# Patient Record
Sex: MALE | Race: WHITE | NOT HISPANIC OR LATINO | Employment: FULL TIME | URBAN - METROPOLITAN AREA
[De-identification: names, ages, dates, MRNs, and addresses within clinical notes are randomized per-mention and may not be internally consistent; named-entity substitution may affect disease eponyms.]

---

## 2017-09-01 ENCOUNTER — OFFICE VISIT (OUTPATIENT)
Dept: PEDIATRICS | Facility: CLINIC | Age: 32
End: 2017-09-01
Payer: COMMERCIAL

## 2017-09-01 VITALS
WEIGHT: 225.4 LBS | OXYGEN SATURATION: 95 % | HEIGHT: 73 IN | TEMPERATURE: 97.9 F | HEART RATE: 64 BPM | BODY MASS INDEX: 29.87 KG/M2 | DIASTOLIC BLOOD PRESSURE: 65 MMHG | SYSTOLIC BLOOD PRESSURE: 102 MMHG

## 2017-09-01 DIAGNOSIS — Z13.6 CARDIOVASCULAR SCREENING; LDL GOAL LESS THAN 160: ICD-10-CM

## 2017-09-01 DIAGNOSIS — L73.9 FOLLICULITIS: ICD-10-CM

## 2017-09-01 DIAGNOSIS — Z00.00 ENCOUNTER FOR ROUTINE ADULT HEALTH EXAMINATION WITHOUT ABNORMAL FINDINGS: Primary | ICD-10-CM

## 2017-09-01 DIAGNOSIS — Z12.5 SCREENING FOR PROSTATE CANCER: ICD-10-CM

## 2017-09-01 DIAGNOSIS — K62.5 RECTAL BLEEDING: ICD-10-CM

## 2017-09-01 DIAGNOSIS — Z80.42 FAMILY HISTORY OF MALIGNANT NEOPLASM OF PROSTATE: ICD-10-CM

## 2017-09-01 DIAGNOSIS — Z13.1 SCREENING FOR DIABETES MELLITUS: ICD-10-CM

## 2017-09-01 LAB
ANION GAP SERPL CALCULATED.3IONS-SCNC: 7 MMOL/L (ref 3–14)
BUN SERPL-MCNC: 14 MG/DL (ref 7–30)
CALCIUM SERPL-MCNC: 9 MG/DL (ref 8.5–10.1)
CHLORIDE SERPL-SCNC: 107 MMOL/L (ref 94–109)
CHOLEST SERPL-MCNC: 209 MG/DL
CO2 SERPL-SCNC: 26 MMOL/L (ref 20–32)
CREAT SERPL-MCNC: 0.91 MG/DL (ref 0.66–1.25)
ERYTHROCYTE [DISTWIDTH] IN BLOOD BY AUTOMATED COUNT: 13.6 % (ref 10–15)
GFR SERPL CREATININE-BSD FRML MDRD: >90 ML/MIN/1.7M2
GLUCOSE SERPL-MCNC: 88 MG/DL (ref 70–99)
HCT VFR BLD AUTO: 46.9 % (ref 40–53)
HDLC SERPL-MCNC: 68 MG/DL
HGB BLD-MCNC: 16.1 G/DL (ref 13.3–17.7)
LDLC SERPL CALC-MCNC: 116 MG/DL
MCH RBC QN AUTO: 28.9 PG (ref 26.5–33)
MCHC RBC AUTO-ENTMCNC: 34.3 G/DL (ref 31.5–36.5)
MCV RBC AUTO: 84 FL (ref 78–100)
NONHDLC SERPL-MCNC: 141 MG/DL
PLATELET # BLD AUTO: 220 10E9/L (ref 150–450)
POTASSIUM SERPL-SCNC: 4.1 MMOL/L (ref 3.4–5.3)
PSA SERPL-ACNC: 0.73 UG/L (ref 0–4)
RBC # BLD AUTO: 5.57 10E12/L (ref 4.4–5.9)
SODIUM SERPL-SCNC: 140 MMOL/L (ref 133–144)
TRIGL SERPL-MCNC: 123 MG/DL
WBC # BLD AUTO: 6 10E9/L (ref 4–11)

## 2017-09-01 PROCEDURE — 99395 PREV VISIT EST AGE 18-39: CPT | Performed by: NURSE PRACTITIONER

## 2017-09-01 PROCEDURE — 80048 BASIC METABOLIC PNL TOTAL CA: CPT | Performed by: NURSE PRACTITIONER

## 2017-09-01 PROCEDURE — G0103 PSA SCREENING: HCPCS | Performed by: NURSE PRACTITIONER

## 2017-09-01 PROCEDURE — 80061 LIPID PANEL: CPT | Performed by: NURSE PRACTITIONER

## 2017-09-01 PROCEDURE — 85027 COMPLETE CBC AUTOMATED: CPT | Performed by: NURSE PRACTITIONER

## 2017-09-01 PROCEDURE — 36415 COLL VENOUS BLD VENIPUNCTURE: CPT | Performed by: NURSE PRACTITIONER

## 2017-09-01 RX ORDER — DOXYCYCLINE HYCLATE 100 MG
100 TABLET ORAL 2 TIMES DAILY
Qty: 14 TABLET | Refills: 0 | Status: SHIPPED | OUTPATIENT
Start: 2017-09-01 | End: 2017-09-14

## 2017-09-01 NOTE — LETTER
September 4, 2017      Loco Landeros                                                                [address]  [place] [address]          Dear Loco,    The results of your recent   -Kidney function is normal (Cr, GFR), Sodium is normal, Potassium is normal, Calcium is normal, Glucose is normal (diabetes screening test).   -LDL(bad) cholesterol level is slightly  elevated,  A diet high in fat and simple carbohydrates, genetics and being overweight can contribute to this. ADVISE: Exercise, a low fat, low carbohydrate diet and weight control are helpful to improve this.  Rechecking your fasting cholesterol panel in 12 months is recommended (Lipid w/ LDL reflex, DX:hyperlipidemia)  -PSA (prostate specific antigen) test is normal.  -Normal red blood cell (hgb) levels, normal white blood cell count and normal platelet levels.    Results for orders placed or performed in visit on 09/01/17   Lipid panel reflex to direct LDL   Result Value Ref Range    Cholesterol 209 (H) <200 mg/dL    Triglycerides 123 <150 mg/dL    HDL Cholesterol 68 >39 mg/dL    LDL Cholesterol Calculated 116 (H) <100 mg/dL    Non HDL Cholesterol 141 (H) <130 mg/dL   Basic metabolic panel   Result Value Ref Range    Sodium 140 133 - 144 mmol/L    Potassium 4.1 3.4 - 5.3 mmol/L    Chloride 107 94 - 109 mmol/L    Carbon Dioxide 26 20 - 32 mmol/L    Anion Gap 7 3 - 14 mmol/L    Glucose 88 70 - 99 mg/dL    Urea Nitrogen 14 7 - 30 mg/dL    Creatinine 0.91 0.66 - 1.25 mg/dL    GFR Estimate >90 >60 mL/min/1.7m2    GFR Estimate If Black >90 >60 mL/min/1.7m2    Calcium 9.0 8.5 - 10.1 mg/dL   Prostate spec antigen screen   Result Value Ref Range    PSA 0.73 0 - 4 ug/L   CBC with platelets   Result Value Ref Range    WBC 6.0 4.0 - 11.0 10e9/L    RBC Count 5.57 4.4 - 5.9 10e12/L    Hemoglobin 16.1 13.3 - 17.7 g/dL    Hematocrit 46.9 40.0 - 53.0 %    MCV 84 78 - 100 fl    MCH 28.9 26.5 - 33.0 pg    MCHC 34.3 31.5 - 36.5 g/dL    RDW 13.6 10.0 -  15.0 %    Platelet Count 220 150 - 450 10e9/L         Please note that test explanations are brief and do not reflect all diagnostic uses.    Please make a follow-up appointment if you have additional questions.    Sincerely,       Vy Argueta RN, CNP

## 2017-09-01 NOTE — NURSING NOTE
"Chief Complaint   Patient presents with     Physical     ABDON-fasting today       Initial /65 (BP Location: Right arm, Patient Position: Sitting, Cuff Size: Adult Large)  Pulse 64  Temp 97.9  F (36.6  C) (Temporal)  Ht 6' 1\" (1.854 m)  Wt 225 lb 6.4 oz (102.2 kg)  SpO2 95%  BMI 29.74 kg/m2 Estimated body mass index is 29.74 kg/(m^2) as calculated from the following:    Height as of this encounter: 6' 1\" (1.854 m).    Weight as of this encounter: 225 lb 6.4 oz (102.2 kg).  Medication Reconciliation: complete      MOR Mo      "

## 2017-09-01 NOTE — MR AVS SNAPSHOT
After Visit Summary   9/1/2017    Loco Landeros    MRN: [id number]           Patient Information     Date Of Birth          1985        Visit Information        Provider Department      9/1/2017 8:00 AM Vy Argueta APRN CNP [facility]        Today's Diagnoses     Encounter for routine adult health examination without abnormal findings    -  1    CARDIOVASCULAR SCREENING; LDL GOAL LESS THAN 160        Screening for diabetes mellitus        Screening for prostate cancer        Family history of malignant neoplasm of prostate        Rectal bleeding        Folliculitis          Care Instructions    PLAN:   1.  Orders Placed This Encounter   Medications     doxycycline (VIBRA-TABS) 100 MG tablet     Sig: Take 1 tablet (100 mg) by mouth 2 times daily     Dispense:  14 tablet     Refill:  0     Orders Placed This Encounter   Procedures     Lipid panel reflex to direct LDL     Basic metabolic panel     JUST IN CASE     Prostate spec antigen screen     CBC with platelets     DERMATOLOGY REFERRAL     GASTROENTEROLOGY ADULT REF PROCEDURE ONLY       2. Patient needs to follow up in if no improvement,or sooner if worsening of symptoms or other symptoms develop.  CONSULTATION/REFERRAL to Dermatology and colonoscopy   Will follow up and/or notify patient on results via phone or mail to determine further need for followup  Follow up office visit in one year for annual health maintenance exam, sooner PRN.    Preventive Health Recommendations  Male Ages 26 - 39    Yearly exam:             See your health care provider every year in order to  o   Review health changes.   o   Discuss preventive care.    o   Review your medicines if your doctor has prescribed any.    You should be tested each year for STDs (sexually transmitted diseases), if you re at risk.     After age 35, talk to your provider about cholesterol testing. If you are at risk for heart disease, have your cholesterol  tested at least every 5 years.     If you are at risk for diabetes, you should have a diabetes test (fasting glucose).  Shots: Get a flu shot each year. Get a tetanus shot every 10 years.     Nutrition:    Eat at least 5 servings of fruits and vegetables daily.     Eat whole-grain bread, whole-wheat pasta and brown rice instead of white grains and rice.     Talk to your provider about Calcium and Vitamin D.     Lifestyle    Exercise for at least 150 minutes a week (30 minutes a day, 5 days a week). This will help you control your weight and prevent disease.     Limit alcohol to one drink per day.     No smoking.     Wear sunscreen to prevent skin cancer.     See your dentist every six months for an exam and cleaning.   It was a pleasure seeing you today at the [facility]. Thank you for allowing us to care for you today. We truly hope we provided you with the excellent service you deserve. Please let us know if there is anything else we can do for you so we can be sure you are leaving completley satisfied with your care experience.       General information about your clinic   Clinic Hours Lab Hours (Appointments are required)   Mon-Thurs: 7:30 AM - 7 PM Mon-Thurs: 7:30 AM - 7 PM   Fri: 7:30 AM - 5 PM Fri: 7:30 AM - 5 PM        After Hours Nurse Advise & Appts:  [facility] Nurse Advisors: [phone]  [facility] On Call: to make appointments anytime: [phone] On Call Physician: call [phone] and answering service will page the on call physician.        For urgent appointments, please call [phone] and ask for the triage nurse or your care team clinic nurse.  How to contact my care team:  MyChart: [email or web address]   Phone: [phone]   Fax: [phone]       [facility]:   Phone: [phone]  Hours: 8:00 AM - 6:00 PM  Medication Refills:  Call your pharmacy and they will forward the refill to us. Please allow 3 business days for your refills to be completed.        Normal or non-critical lab and imaging results will be communicated to you by MyChart, letter or phone within 7 days.  If you do not hear from us within 10 days, please call the clinic. If you have a critical or abnormal lab result, we will notify you by phone as soon as possible.       We now have PWIC (Pediatric Walk in Care)  Monday-Friday from 7:30-4. Simply walk in and be seen for your urgent needs like cough, fever, rash, diarrhea or vomiting, pink eye, UTI. No appointments needed. Ask one of the team for more information      -Your Care Team:    Dr. Maynor Piña - Internal Medicine/Pediatrics   Dr. Rebekah Love - Family Medicine  Dr. Mckenzie Judd - Pediatrics  Dr. Debra Martinez - Pediatrics  Vy Argueta CNP - Family Practice Nurse Practitioner                         Follow-ups after your visit        Additional Services     DERMATOLOGY REFERRAL       Your provider has referred you to: [facility]: [facility] [phone]   [email or web address]    Please be aware that coverage of these services is subject to the terms and limitations of your health insurance plan.  Call member services at your health plan with any benefit or coverage questions.      Please bring the following with you to your appointment:    (1) Any X-Rays, CTs or MRIs which have been performed.  Contact the facility where they were done to arrange for  prior to your scheduled appointment.    (2) List of current medications  (3) This referral request   (4) Any documents/labs given to you for this referral            GASTROENTEROLOGY ADULT REF PROCEDURE ONLY                 Who to contact     If you have questions or need follow up information about today's clinic visit or your schedule please contact [facility] directly at [phone].  Normal or non-critical lab and imaging results will be communicated to you by MyChart, letter or  "phone within 4 business days after the clinic has received the results. If you do not hear from us within 7 days, please contact the clinic through [facility] or phone. If you have a critical or abnormal lab result, we will notify you by phone as soon as possible.  Submit refill requests through [facility] or call your pharmacy and they will forward the refill request to us. Please allow 3 business days for your refill to be completed.          Additional Information About Your Visit        [facility] Information     [facility] is an electronic gateway that provides easy, online access to your medical records. With [facility], you can request a clinic appointment, read your test results, renew a prescription or communicate with your care team.     To sign up for [facility] visit the website at [email or web address]   You will be asked to enter the access code listed below, as well as some personal information. Please follow the directions to create your username and password.     Your access code is: [email or web address]  Expires: 2017  8:33 AM     Your access code will  in 90 days. If you need help or a new code, please contact your [facility] or call [phone] for assistance.        Care EveryWhere ID     This is your Care EveryWhere ID. This could be used by other organizations to access your [place] medical records  [id number]        Your Vitals Were     Pulse Temperature Height Pulse Oximetry BMI (Body Mass Index)       64 97.9  F (36.6  C) (Temporal) 6' 1\" (1.854 m) 95% 29.74 kg/m2        Blood Pressure from Last 3 Encounters:   17 102/65   16 102/70   04/29/15 106/70    Weight from Last 3 Encounters:   17 225 lb 6.4 oz (102.2 kg)   16 233 lb 1.6 oz (105.7 kg)   04/29/15 231 lb 3.2 oz (104.9 kg)              We Performed the Following     Basic metabolic panel     CBC with platelets     DERMATOLOGY REFERRAL     GASTROENTEROLOGY ADULT REF PROCEDURE ONLY     " JUST IN CASE     Lipid panel reflex to direct LDL     Prostate spec antigen screen          Today's Medication Changes          These changes are accurate as of: 9/1/17  8:34 AM.  If you have any questions, ask your nurse or doctor.               Start taking these medicines.        Dose/Directions    doxycycline 100 MG tablet   Commonly known as:  VIBRA-TABS   Used for:  Folliculitis   Started by:  Vy Argueta APRN CNP        Dose:  100 mg   Take 1 tablet (100 mg) by mouth 2 times daily   Quantity:  14 tablet   Refills:  0            Where to get your medicines      These medications were sent to [facility] [address] [facility] - [place], [place] - [address]  [address], [facility] [address]     Phone:  [phone]     doxycycline 100 MG tablet                Primary Care Provider    [facility]       No address on file        Equal Access to Services     STEFANIA TAYLOR : Wanda Lugo, waarnaldo luqadaha, qaybta kaalmagee prado, laurence tyson . [facility] [phone].    ATENCIÓN: Si habla español, tiene a torrez disposición servicios gratuitos de asistencia lingüística. Llame al [phone].    We comply with applicable federal civil rights laws and [place] laws. We do not discriminate on the basis of race, color, national origin, age, disability sex, sexual orientation or gender identity.            Thank you!     Thank you for choosing [facility]  for your care. Our goal is always to provide you with excellent care. Hearing back from our patients is one way we can continue to improve our services. Please take a few minutes to complete the written survey that you may receive in the mail after your visit with us. Thank you!             Your Updated Medication List - Protect others around you: Learn how to safely use, store and throw away your medicines at www.disposemymeds.org.          This list is accurate as of:  9/1/17  8:34 AM.  Always use your most recent med list.                   Brand Name Dispense Instructions for use Diagnosis    doxycycline 100 MG tablet    VIBRA-TABS    14 tablet    Take 1 tablet (100 mg) by mouth 2 times daily    Folliculitis

## 2017-09-01 NOTE — PROGRESS NOTES
SUBJECTIVE:   CC: Loco Landeros is an 32 year old male who presents for preventative health visit.     Healthy Habits:    Do you get at least three servings of calcium containing foods daily (dairy, green leafy vegetables, etc.)? yes    Amount of exercise or daily activities, outside of work: 3-4 day(s) per week    Problems taking medications regularly not applicable    Medication side effects: No    Have you had an eye exam in the past two years? no    Do you see a dentist twice per year? yes  Do you have sleep apnea, excessive snoring or daytime drowsiness?no      Today's PHQ-2 Score:   PHQ-2 ( 1999 [facility]) 9/1/2017 2/8/2016   Q1: Little interest or pleasure in doing things 0 0   Q2: Feeling down, depressed or hopeless 0 0   PHQ-2 Score 0 0         Abuse: Current or Past(Physical, Sexual or Emotional)- No  Do you feel safe in your environment - Yes  Social History   Substance Use Topics     Smoking status: Former Smoker     Packs/day: 0.50     Years: 5.00     Types: Cigarettes     Smokeless tobacco: Former User     Types: Chew     Quit date: 1/1/2002      Comment: socially smoker     Alcohol use Yes      Comment: once a week      The patient does not drink >3 drinks per day nor >7 drinks per week.    Last PSA:   PSA   Date Value Ref Range Status   04/29/2015 0.72 0 - 4 ug/L Final       Reviewed orders with patient. Reviewed health maintenance and updated orders accordingly - Yes  Labs reviewed in EPIC  Patient Active Problem List   Diagnosis     Degeneration of lumbar or lumbosacral intervertebral disc     Family history of malignant neoplasm of prostate     Low back pain     Nicotine use disorder     Past Surgical History:   Procedure Laterality Date     NO HISTORY OF SURGERY         Social History   Substance Use Topics     Smoking status: Former Smoker     Packs/day: 0.50     Years: 5.00     Types: Cigarettes     Smokeless tobacco: Former User     Types: Chew     Quit date: 1/1/2002      Comment: socially  smoker     Alcohol use Yes      Comment: once a week      Family History   Problem Relation Age of Onset     Prostate Cancer Father      Hypertension Brother      Coronary Artery Disease Maternal Grandfather      DIABETES No family hx of      Hyperlipidemia No family hx of      CEREBROVASCULAR DISEASE No family hx of      Breast Cancer No family hx of      Colon Cancer No family hx of      Depression No family hx of      Anxiety Disorder No family hx of      Thyroid Disease No family hx of      Genetic Disorder No family hx of      Asthma No family hx of          No current outpatient prescriptions on file.     No Known Allergies      Reviewed and updated as needed this visit by clinical staffTobacco  Allergies  Meds  Med Hx  Surg Hx  Fam Hx  Soc Hx        Reviewed and updated as needed this visit by Provider        Past Medical History:   Diagnosis Date     NO ACTIVE PROBLEMS       Past Surgical History:   Procedure Laterality Date     NO HISTORY OF SURGERY       ROS:  CONSTITUTIONAL:NEGATIVE for fever, chills, change in weight  INTEGUMENTARY/SKIN: NEGATIVE for changing lesion  and POSITIVE for acne on his back which has been going for about 10 years   EYES: NEGATIVE for vision changes or irritation  ENT: POSITIVE for ears are popping  and NEGATIVE for nasal congestion and sinus pressure  RESP:NEGATIVE for significant cough or SOB  CV: NEGATIVE for chest pain, palpitations or peripheral edema  GI: POSITIVE for constipation and noticed blood in stool periodically with wiping.  and NEGATIVE for nausea, poor appetite, vomiting and weight loss   male: negative for dysuria, hematuria, decreased urinary stream, erectile dysfunction, urethral discharge  MUSCULOSKELETAL:NEGATIVE for significant arthralgias or myalgia  NEURO: POSITIVE for few days a couple weeks ago was dizzy  and NEGATIVE for HX CVA, HX TIA, HX seizure D/O, involuntary movements, gait disturbance and syncope  ENDOCRINE: NEGATIVE for temperature  "intolerance, skin/hair changes  HEME/ALLERGY/IMMUNE: POSITIVE  for allergies and NEGATIVE for anemia and bleeding disorder  PSYCHIATRIC: NEGATIVE for changes in mood or affect    OBJECTIVE:   /65 (BP Location: Right arm, Patient Position: Sitting, Cuff Size: Adult Large)  Pulse 64  Temp 97.9  F (36.6  C) (Temporal)  Ht 6' 1\" (1.854 m)  Wt 225 lb 6.4 oz (102.2 kg)  SpO2 95%  BMI 29.74 kg/m2   Wt Readings from Last 4 Encounters:   09/01/17 225 lb 6.4 oz (102.2 kg)   02/08/16 233 lb 1.6 oz (105.7 kg)   04/29/15 231 lb 3.2 oz (104.9 kg)       EXAM:  GENERAL: alert, no distress and obese  EYES: Eyes grossly normal to inspection, PERRL and conjunctivae and sclerae normal  HENT: ear canals and TM's normal, nose and mouth without ulcers or lesions  NECK: no adenopathy, no asymmetry, masses, or scars and thyroid normal to palpation  RESP: lungs clear to auscultation - no rales, rhonchi or wheezes  CV: regular rate and rhythm, normal S1 S2, no S3 or S4, no murmur, click or rub, no peripheral edema and peripheral pulses strong  ABDOMEN: soft, nontender, no hepatosplenomegaly, no masses and bowel sounds normal   (male): normal male genitalia without lesions or urethral discharge, no hernia  RECTAL: normal appearing without obvious hemorrhoid   MS: no gross musculoskeletal defects noted, no edema  SKIN: no suspicious lesions or rashes. Isolated  small red papules and pustules originating in the hair follicles scattered over the back   NEURO: Normal strength and tone, mentation intact and speech normal  PSYCH: mentation appears normal, affect normal/bright  LYMPH: no cervical, supraclavicular, axillary, or inguinal adenopathy    ASSESSMENT/PLAN:   Loco was seen today for physical.    Diagnoses and all orders for this visit:    Encounter for routine adult health examination without abnormal findings  -     Lipid panel reflex to direct LDL  -     Basic metabolic panel  -     JUST IN CASE  -     Prostate spec " "antigen screen    CARDIOVASCULAR SCREENING; LDL GOAL LESS THAN 160  -     Lipid panel reflex to direct LDL    Screening for diabetes mellitus  -     Basic metabolic panel    Screening for prostate cancer  -     Prostate spec antigen screen    Family history of malignant neoplasm of prostate  -     Prostate spec antigen screen    Rectal bleeding  -     CBC with platelets  -     GASTROENTEROLOGY ADULT REF PROCEDURE ONLY    Folliculitis  -     DERMATOLOGY REFERRAL  -     doxycycline (VIBRA-TABS) 100 MG tablet; Take 1 tablet (100 mg) by mouth 2 times daily    PLAN:   Patient needs to follow up in if no improvement,or sooner if worsening of symptoms or other symptoms develop.  CONSULTATION/REFERRAL to Dermatology and colonoscopy   Will follow up and/or notify patient on results via phone or mail to determine further need for followup  Follow up office visit in one year for annual health maintenance exam, sooner PRN.    COUNSELING:  Reviewed preventive health counseling, as reflected in patient instructions  Special attention given to:        Regular exercise       Healthy diet/nutrition       Vision screening       reports that he has quit smoking. His smoking use included Cigarettes. He quit smokeless tobacco use about 15 years ago. His smokeless tobacco use included Chew.      Estimated body mass index is 30.75 kg/(m^2) as calculated from the following:    Height as of 2/8/16: 6' 1\" (1.854 m).    Weight as of 2/8/16: 233 lb 1.6 oz (105.7 kg).   Weight management plan: Discussed healthy diet and exercise guidelines and patient will follow up in 12 months in clinic to re-evaluate.    Counseling Resources:  ATP IV Guidelines  Pooled Cohorts Equation Calculator  FRAX Risk Assessment  ICSI Preventive Guidelines  Dietary Guidelines for Americans, 2010  USDA's MyPlate  ASA Prophylaxis  Lung CA Screening    Vy Argueta, ESSIE [facility]  "

## 2017-09-08 ENCOUNTER — PRE VISIT (OUTPATIENT)
Dept: DERMATOLOGY | Facility: CLINIC | Age: 32
End: 2017-09-08

## 2017-09-08 NOTE — TELEPHONE ENCOUNTER
Dermatology Pre-visit Call:    Reason for visit : skin check and folliculitis     Any personal history of skin cancers: No    Was the patient referred: Yes    If the patient was referred, are records obtained: No. (If no, then obtain records).    Has the patient seen a dermatologist in the past: ?. (If yes, obtain records)    Patient Reminders Given:  --Please, make sure you bring an updated list of your medications.   --Plan on being in our facility for approximately one hour, this includes the registration process, office visit, education and check-out process.   --We are located on the second floor of the building, check in desk 4.   --If you need to cancel or reschedule, call [phone].  --We look forward to seeing you in Dermatology Clinic.     Bessie Escobar LPN

## 2017-09-14 ENCOUNTER — OFFICE VISIT (OUTPATIENT)
Dept: DERMATOLOGY | Facility: CLINIC | Age: 32
End: 2017-09-14
Attending: NURSE PRACTITIONER
Payer: COMMERCIAL

## 2017-09-14 DIAGNOSIS — L70.0 ACNE VULGARIS: Primary | ICD-10-CM

## 2017-09-14 PROCEDURE — 99202 OFFICE O/P NEW SF 15 MIN: CPT | Performed by: DERMATOLOGY

## 2017-09-14 PROCEDURE — 87070 CULTURE OTHR SPECIMN AEROBIC: CPT | Performed by: DERMATOLOGY

## 2017-09-14 RX ORDER — TRETINOIN 0.5 MG/G
CREAM TOPICAL
Qty: 45 G | Refills: 11 | Status: SHIPPED | OUTPATIENT
Start: 2017-09-14 | End: 2018-07-05

## 2017-09-14 RX ORDER — DOXYCYCLINE 100 MG/1
100 TABLET ORAL 2 TIMES DAILY
Qty: 60 TABLET | Refills: 2 | Status: SHIPPED | OUTPATIENT
Start: 2017-09-14 | End: 2018-04-02

## 2017-09-14 ASSESSMENT — PAIN SCALES - GENERAL: PAINLEVEL: NO PAIN (0)

## 2017-09-14 NOTE — NURSING NOTE
Dermatology Rooming Note    Loco Landeros's goals for this visit include:   Chief Complaint   Patient presents with     Derm Problem     Folliculitis on back and face.  7 day course of doxycycline prescribed by PCP and patient noticed improvement.       Is a scribe okay for this visit:Not applicable    Are records needed for this visit(If yes, obtain release of information): No     Vitals: There were no vitals taken for this visit.    Referring Provider:  ESSIE Petersen CNP  [address]  [place] [address]    Sagrario Salas RN

## 2017-09-14 NOTE — LETTER
9/14/2017       RE: Loco Landeros  [address] [facility] [address]     Dear Colleague,    Thank you for referring your patient, Loco Landeros, to the [facility]. Please see a copy of my visit note below.    [facility] Dermatology Note      Dermatology Problem List:  1. New patient    CC:   Chief Complaint   Patient presents with     Derm Problem     Folliculitis on back and face.  7 day course of doxycycline prescribed by PCP and patient noticed improvement.       Encounter Date: Sep 14, 2017    History of Present Illness:  Mr. Loco Landeros is a 32 year old male who presents for evaluation of folliculitis and acne.     He has papules and pustules constantly on his back and face. It has been bad for about 10 years. At times there will be multiple pustules on top of one another, but this only occurs on his face. He is getting deeper painful pustules and cysts on his back. He has tried some OTC acne washes, but they have not been effective for the deeper or bigger pustules. There is scarring happening on his back and face.     He was prescribed a 7 day course of doxycyline which seemed to help, but the pustules have recurred since he discontinued.        Past Medical History:   Patient Active Problem List   Diagnosis     Degeneration of lumbar or lumbosacral intervertebral disc     Family history of malignant neoplasm of prostate     Low back pain     Nicotine use disorder     Past Medical History:   Diagnosis Date     NO ACTIVE PROBLEMS      Past Surgical History:   Procedure Laterality Date     NO HISTORY OF SURGERY         Social History:  The patient works as a  for [employer].   He does drink 3-6 alcoholic beverages a week. He does not smoke or chew.     Family History:  No family hx of skin cancer.     Medications:  Current Outpatient Prescriptions   Medication Sig Dispense Refill      doxycycline Monohydrate 100 MG TABS Take 100 mg by mouth 2 times daily 60 tablet 2     tretinoin (RETIN-A) 0.05 % cream Spread a pea size amount into affected area (face and back) topically at bedtime.  Use sunscreen SPF>20. 45 g 11     No Known Allergies      Review of Systems:  -Constitutional: The patient denies fatigue, fevers, chills, unintended weight loss, and night sweats.  -Skin: As above in HPI. No additional skin concerns.    Physical exam:  Vitals: There were no vitals taken for this visit.  GEN: This is a well developed, well-nourished male in no acute distress, in a pleasant mood.    SKIN: Waist-up skin, which includes the head/face, neck, both arms, chest, back, abdomen, digits and/or nails was examined.  - Multiple inflammatory papules and pustules scattered on his face and upper neck.   - His upper and mid back have multiple papules, pustules and cysts.   - Several resolving inflammatory papules on his back as well.     -No other lesions of concern on areas examined.     Impression/Plan:  1. Acne vulgaris    Discussed varying acne treatments. The multiple pustules can be indicative of MRSA, the papules on his jawline were cultured today.     We discussed topicals are unlikely to be helpful alone, but in combination with antibiotics may improve.     Also discussed Accutane briefly.     Patient ultimately chose to try PO antibiotics with topicals, but if not improving he is interested in Accutane.     Start Doxycycline 100mg PO BID. Discussed SE - nausea and photosensitivity.     Start tretinoin 0.05% cream to face and back. Instructions reviewed.     Recommended BP wash in the shower.     If culture confirms MRSA, will have patient decolonize with Mupirocin.     Follow-up in 3 months     Staff Involved:  Staff Only    Blade Wan DO    Department of Dermatology  [facility]: Phone: [phone],  Fax:[phone]  [facility]: Phone: [phone], Fax: [phone]

## 2017-09-14 NOTE — MR AVS SNAPSHOT
After Visit Summary   9/14/2017    Loco Landeros    MRN: [id number]           Patient Information     Date Of Birth          1985        Visit Information        Provider Department      9/14/2017 4:30 PM Blade Wan MD [facility]        Today's Diagnoses     Acne vulgaris    -  1      Care Instructions    Start over-the-counter benzoyl peroxide 10% wash on the face and back.  If 10% is too irritating you can use the 5%. (Clean&Clear makes this product. It is available here at the pharmacy or at target). This medication can bleach your towels and clothing.     It is found in a purple tube in the acne aisle.                 Doxycycline     1.Doxycycline treats and prevents infections and may be used to treat acne.   2.Do not use it if you had an allergic reaction to doxycycline or another tetracycline antibiotic, or if you are pregnant or breastfeeding.  3. If you miss a dose, take a dose as soon as you remember. If it is almost time for your next dose, wait until then and take a regular dose. Do not take extra medicine to make up for a missed dose.   4 . Some foods and medicines can affect the medication. Tell your doctor if you are using any of the following: bismuth subsalicylate, isotretinoin, acitretin, medications that contain aluminum, calcium, or iron (such an antacid or vitamin supplement)  5. This medicine may cause birth defects if being used during pregnancy.  Use two forms of birth control to keep from getting pregnant.   6. Tell your doctor if you had stomach surgery or if you have a history of yeast infections.   7. This medicine may cause the following problems (stop the medication if you experience these symptoms and call your doctor):  Permanent change in tooth color (in children younger than 8 years old)   Increased pressure inside the head   Yeast infection   Diarrhea    This medicine may make your skin more sun sensitive and increase sun burns. Wear  sunscreen and do not tan.     Allergic reaction with itching, hives, facial swelling, throat swelling, chest tightness, trouble breathing     Blistering, peeling, red skin rash     Burning, pain, or irritation in your upper stomach or throat     Joint pain, fever, rash, and unusual tiredness or weakness     Severe headache, dizziness, or vision changes  8. Call your doctor if your symptoms worsen or do not improve  Who should I call with questions?    [facility]: [phone]     [facility]: [phone]    For urgent needs outside of business hours call the [facility] at [phone] and ask for the dermatology resident on call              Topical Retinoids    What are topical retinoids?    These are medicines that are related to Vitamin A. They are used on the skin.    Retin-A , Renova , Differin , and Tazorac  are brand names.    Come in creams and clear gels    Used to treat skin conditions like pimples (acne), face wrinkling, or dark-colored sunspots    How do I use these medicines?    Wash face and let dry for 15 to 30 minutes.    Use a large pea-size amount of medicine to cover the whole face. Do not put on close to the eyes and lips. Rub in gently.     Start by using every other day. If you have no irritation after a few days, start to use it daily.     You might have too much irritation with daily use. Use it less often until the irritation goes away. Then try to increase slowly to daily use.     Irritation improves over time.    You may use moisturizer if your skin becomes dry. Look for  non-comedogenic  (non-pore plugging) and oil free products.     What are the side effects?    Dryness     Peeling and flaking     Irritation of the skin     Possible increased chance of sunburns. Protect your skin from sunlight. Wear a hat and use a sunscreen with SPF 30 or higher. Your sunscreen should have both UVA and UVB (broad-spectrum)  protection.    Who should I call with questions?    [facility]: [phone]     [facility]: [phone]    For urgent needs outside of business hours call the [facility] at [phone] and ask for the dermatology resident on call              Follow-ups after your visit        Your next 10 appointments already scheduled     Oct 02, 2017   Pro[place] with Justa Pickard [place]   [facility] [place]    [address] [facility] [address] [phone]            Jan 09, 2018  3:30 PM CST   Return Visit with Ricarda Barba MD   [facility]    [address]  [facility] [address] [phone]              Who to contact     If you have questions or need follow up information about today's clinic visit or your schedule please contact [facility] directly at [phone].  Normal or non-critical lab and imaging results will be communicated to you by MyChart, letter or phone within 4 business days after the clinic has received the results. If you do not hear from us within 7 days, please contact the clinic through MyChart or phone. If you have a critical or abnormal lab result, we will notify you by phone as soon as possible.  Submit refill requests through [facility] or call your pharmacy and they will forward the refill request to us. Please allow 3 business days for your refill to be completed.          Additional Information About Your Visit        [facility] Information     [facility] is an electronic gateway that provides easy, online access to your medical records. With [facility], you can request a clinic appointment, read your test results, renew a prescription or communicate with your care team.     To sign up for [facility] visit the website at [email or web address]   You will be asked to enter the access code listed below, as well as some personal  information. Please follow the directions to create your username and password.     Your access code is: [email or web address]  Expires: 2017  8:33 AM     Your access code will  in 90 days. If you need help or a new code, please contact your [facility] or call [phone] for assistance.        Care EveryWhere ID     This is your Care EveryWhere ID. This could be used by other organizations to access your [facility] medical records  [id number]         Blood Pressure from Last 3 Encounters:   17 102/65   16 102/70   04/29/15 106/70    Weight from Last 3 Encounters:   17 102.2 kg (225 lb 6.4 oz)   16 105.7 kg (233 lb 1.6 oz)   04/29/15 104.9 kg (231 lb 3.2 oz)              We Performed the Following     Skin Culture Aerobic Bacterial          Today's Medication Changes          These changes are accurate as of: 17  5:43 PM.  If you have any questions, ask your nurse or doctor.               Start taking these medicines.        Dose/Directions    doxycycline Monohydrate 100 MG Tabs   Used for:  Acne vulgaris   Started by:  Blade Wan MD        Dose:  100 mg   Take 100 mg by mouth 2 times daily   Quantity:  60 tablet   Refills:  2       tretinoin 0.05 % cream   Commonly known as:  RETIN-A   Used for:  Acne vulgaris   Started by:  Blade Wan MD        Spread a pea size amount into affected area (face and back) topically at bedtime.  Use sunscreen SPF>20.   Quantity:  45 g   Refills:  11            Where to get your medicines      These medications were sent to [place] [address] [place], [place] - [address]  [address], [facility] [address]     Phone:  [phone]     doxycycline Monohydrate 100 MG Tabs    tretinoin 0.05 % cream                Primary Care Provider    [facility]       No address on file        Equal Access to Services     STEFANIA TAYLOR AH: jaime Wells,  ross soraidagabriele rebecalaurence lindsey anatin hayaan adeeg kharash la'aan ah. So [facility] [phone].    ATENCIÓN: Si abdullahi calvert, tiene a torrez disposición servicios gratuitos de asistencia lingüística. Lindsey al [phone].    We comply with applicable federal civil rights laws and [place] laws. We do not discriminate on the basis of race, color, national origin, age, disability sex, sexual orientation or gender identity.            Thank you!     Thank you for choosing [facility]  for your care. Our goal is always to provide you with excellent care. Hearing back from our patients is one way we can continue to improve our services. Please take a few minutes to complete the written survey that you may receive in the mail after your visit with us. Thank you!             Your Updated Medication List - Protect others around you: Learn how to safely use, store and throw away your medicines at www.disposemymeds.org.          This list is accurate as of: 9/14/17  5:43 PM.  Always use your most recent med list.                   Brand Name Dispense Instructions for use Diagnosis    doxycycline Monohydrate 100 MG Tabs     60 tablet    Take 100 mg by mouth 2 times daily    Acne vulgaris       tretinoin 0.05 % cream    RETIN-A    45 g    Spread a pea size amount into affected area (face and back) topically at bedtime.  Use sunscreen SPF>20.    Acne vulgaris

## 2017-09-14 NOTE — PATIENT INSTRUCTIONS
Start over-the-counter benzoyl peroxide 10% wash on the face and back.  If 10% is too irritating you can use the 5%. (Clean&Clear makes this product. It is available here at the pharmacy or at target). This medication can bleach your towels and clothing.     It is found in a purple tube in the acne aisle.                 Doxycycline     1.Doxycycline treats and prevents infections and may be used to treat acne.   2.Do not use it if you had an allergic reaction to doxycycline or another tetracycline antibiotic, or if you are pregnant or breastfeeding.  3. If you miss a dose, take a dose as soon as you remember. If it is almost time for your next dose, wait until then and take a regular dose. Do not take extra medicine to make up for a missed dose.   4 . Some foods and medicines can affect the medication. Tell your doctor if you are using any of the following: bismuth subsalicylate, isotretinoin, acitretin, medications that contain aluminum, calcium, or iron (such an antacid or vitamin supplement)  5. This medicine may cause birth defects if being used during pregnancy.  Use two forms of birth control to keep from getting pregnant.   6. Tell your doctor if you had stomach surgery or if you have a history of yeast infections.   7. This medicine may cause the following problems (stop the medication if you experience these symptoms and call your doctor):  Permanent change in tooth color (in children younger than 8 years old)   Increased pressure inside the head   Yeast infection   Diarrhea    This medicine may make your skin more sun sensitive and increase sun burns. Wear sunscreen and do not tan.     Allergic reaction with itching, hives, facial swelling, throat swelling, chest tightness, trouble breathing     Blistering, peeling, red skin rash     Burning, pain, or irritation in your upper stomach or throat     Joint pain, fever, rash, and unusual tiredness or weakness     Severe headache, dizziness, or vision  changes  8. Call your doctor if your symptoms worsen or do not improve  Who should I call with questions?    [facility]: [phone]     [facility]: [phone]    For urgent needs outside of business hours call the [facility] at [phone] and ask for the dermatology resident on call              Topical Retinoids    What are topical retinoids?    These are medicines that are related to Vitamin A. They are used on the skin.    Retin-A , Renova , Differin , and Tazorac  are brand names.    Come in creams and clear gels    Used to treat skin conditions like pimples (acne), face wrinkling, or dark-colored sunspots    How do I use these medicines?    Wash face and let dry for 15 to 30 minutes.    Use a large pea-size amount of medicine to cover the whole face. Do not put on close to the eyes and lips. Rub in gently.     Start by using every other day. If you have no irritation after a few days, start to use it daily.     You might have too much irritation with daily use. Use it less often until the irritation goes away. Then try to increase slowly to daily use.     Irritation improves over time.    You may use moisturizer if your skin becomes dry. Look for  non-comedogenic  (non-pore plugging) and oil free products.     What are the side effects?    Dryness     Peeling and flaking     Irritation of the skin     Possible increased chance of sunburns. Protect your skin from sunlight. Wear a hat and use a sunscreen with SPF 30 or higher. Your sunscreen should have both UVA and UVB (broad-spectrum) protection.    Who should I call with questions?    [facility]: [phone]     [facility]: [phone]    For urgent needs outside of business hours call the [facility] at [phone] and ask for the dermatology resident on call

## 2017-09-15 NOTE — PROGRESS NOTES
[facility] Dermatology Note      Dermatology Problem List:  1. New patient    CC:   Chief Complaint   Patient presents with     Derm Problem     Folliculitis on back and face.  7 day course of doxycycline prescribed by PCP and patient noticed improvement.       Encounter Date: Sep 14, 2017    History of Present Illness:  Mr. Loco Landeros is a 32 year old male who presents for evaluation of folliculitis and acne.     He has papules and pustules constantly on his back and face. It has been bad for about 10 years. At times there will be multiple pustules on top of one another, but this only occurs on his face. He is getting deeper painful pustules and cysts on his back. He has tried some OTC acne washes, but they have not been effective for the deeper or bigger pustules. There is scarring happening on his back and face.     He was prescribed a 7 day course of doxycyline which seemed to help, but the pustules have recurred since he discontinued.        Past Medical History:   Patient Active Problem List   Diagnosis     Degeneration of lumbar or lumbosacral intervertebral disc     Family history of malignant neoplasm of prostate     Low back pain     Nicotine use disorder     Past Medical History:   Diagnosis Date     NO ACTIVE PROBLEMS      Past Surgical History:   Procedure Laterality Date     NO HISTORY OF SURGERY         Social History:  The patient works as a  for [employer].   He does drink 3-6 alcoholic beverages a week. He does not smoke or chew.     Family History:  No family hx of skin cancer.     Medications:  Current Outpatient Prescriptions   Medication Sig Dispense Refill     doxycycline Monohydrate 100 MG TABS Take 100 mg by mouth 2 times daily 60 tablet 2     tretinoin (RETIN-A) 0.05 % cream Spread a pea size amount into affected area (face and back) topically at bedtime.  Use sunscreen SPF>20. 45 g 11     No Known Allergies      Review of  Systems:  -Constitutional: The patient denies fatigue, fevers, chills, unintended weight loss, and night sweats.  -Skin: As above in HPI. No additional skin concerns.    Physical exam:  Vitals: There were no vitals taken for this visit.  GEN: This is a well developed, well-nourished male in no acute distress, in a pleasant mood.    SKIN: Waist-up skin, which includes the head/face, neck, both arms, chest, back, abdomen, digits and/or nails was examined.  - Multiple inflammatory papules and pustules scattered on his face and upper neck.   - His upper and mid back have multiple papules, pustules and cysts.   - Several resolving inflammatory papules on his back as well.     -No other lesions of concern on areas examined.     Impression/Plan:  1. Acne vulgaris    Discussed varying acne treatments. The multiple pustules can be indicative of MRSA, the papules on his jawline were cultured today.     We discussed topicals are unlikely to be helpful alone, but in combination with antibiotics may improve.     Also discussed Accutane briefly.     Patient ultimately chose to try PO antibiotics with topicals, but if not improving he is interested in Accutane.     Start Doxycycline 100mg PO BID. Discussed SE - nausea and photosensitivity.     Start tretinoin 0.05% cream to face and back. Instructions reviewed.     Recommended BP wash in the shower.     If culture confirms MRSA, will have patient decolonize with Mupirocin.     Follow-up in 3 months     Staff Involved:  Staff Only    Blade Wan DO    Department of Dermatology  [facility]: Phone: [phone], Fax:[phone]  [facility]: Phone: [phone], Fax: [phone]

## 2017-09-16 LAB
BACTERIA SPEC CULT: NORMAL
SPECIMEN SOURCE: NORMAL

## 2017-10-02 ENCOUNTER — HOSPITAL ENCOUNTER (OUTPATIENT)
Facility: AMBULATORY SURGERY CENTER | Age: 32
Discharge: HOME | End: 2017-10-02
Attending: FAMILY MEDICINE | Admitting: FAMILY MEDICINE
Payer: COMMERCIAL

## 2017-10-02 ENCOUNTER — SURGERY (OUTPATIENT)
Age: 32
End: 2017-10-02

## 2017-10-02 VITALS
SYSTOLIC BLOOD PRESSURE: 127 MMHG | OXYGEN SATURATION: 97 % | TEMPERATURE: 98.2 F | RESPIRATION RATE: 16 BRPM | DIASTOLIC BLOOD PRESSURE: 92 MMHG

## 2017-10-02 LAB — COLONOSCOPY: NORMAL

## 2017-10-02 PROCEDURE — 99152 MOD SED SAME PHYS/QHP 5/>YRS: CPT | Mod: 59 | Performed by: FAMILY MEDICINE

## 2017-10-02 PROCEDURE — G8907 PT DOC NO EVENTS ON DISCHARG: HCPCS

## 2017-10-02 PROCEDURE — 99153 MOD SED SAME PHYS/QHP EA: CPT | Performed by: FAMILY MEDICINE

## 2017-10-02 PROCEDURE — G8918 PT W/O PREOP ORDER IV AB PRO: HCPCS

## 2017-10-02 PROCEDURE — 45378 DIAGNOSTIC COLONOSCOPY: CPT

## 2017-10-02 PROCEDURE — 45378 DIAGNOSTIC COLONOSCOPY: CPT | Performed by: FAMILY MEDICINE

## 2017-10-02 RX ORDER — LIDOCAINE 40 MG/G
CREAM TOPICAL
Status: DISCONTINUED | OUTPATIENT
Start: 2017-10-02 | End: 2017-10-03 | Stop reason: HOSPADM

## 2017-10-02 RX ORDER — ONDANSETRON 2 MG/ML
4 INJECTION INTRAMUSCULAR; INTRAVENOUS
Status: DISCONTINUED | OUTPATIENT
Start: 2017-10-02 | End: 2017-10-03 | Stop reason: HOSPADM

## 2017-10-02 RX ORDER — FENTANYL CITRATE 50 UG/ML
INJECTION, SOLUTION INTRAMUSCULAR; INTRAVENOUS PRN
Status: DISCONTINUED | OUTPATIENT
Start: 2017-10-02 | End: 2017-10-02 | Stop reason: HOSPADM

## 2017-10-02 RX ADMIN — FENTANYL CITRATE 100 MCG: 50 INJECTION, SOLUTION INTRAMUSCULAR; INTRAVENOUS at 10:42

## 2017-10-02 RX ADMIN — FENTANYL CITRATE 50 MCG: 50 INJECTION, SOLUTION INTRAMUSCULAR; INTRAVENOUS at 10:52

## 2017-10-02 RX ADMIN — FENTANYL CITRATE 50 MCG: 50 INJECTION, SOLUTION INTRAMUSCULAR; INTRAVENOUS at 10:47

## 2017-12-29 ENCOUNTER — TELEPHONE (OUTPATIENT)
Dept: PEDIATRICS | Facility: CLINIC | Age: 32
End: 2017-12-29

## 2017-12-29 NOTE — TELEPHONE ENCOUNTER
Patient states that he was told he has hemorrhoids at his colonoscopy and everything looked pretty good, nothing to be concerned about.  He was under the impression he would have a follow up with a doctor to discuss the results and possible diet information.   He didn't and forgot about it because things had been fine.   Should he be following up with someone regarding this?    The night before christmas he ate velveeta cheese with rotel tomatoes.  Christmas day and the couple days following he was constipated and struggled to have BM.  He noticed blood in his stool like red pepper chunks and blood on the toilet paper.  He was really uncomfortable, then had diarrhea symptoms  He was laboring hard to have a BM.      Currently he has no more blood, has been getting better.  Still feels cramping all the way down to his feet.      Informed patient the velveeta cheese in its non melty state is exactly what happens inside your body the cheese kind of gels.  Most likely causing the constipation.  Also with his labored BM's causes pressure and the hemorrhoids can open up and bleed.  He states he didn't think of that.  He states maybe he was seeing the rotel tomatoes and not blood in his stool.    Routing to provider to advise further.    Luz Castillo RN,   . [facility]          Per 10/2/17 colonoscopy:  Procedure:                Colonoscopy   Indications:              Rectal bleeding   Impression:               - Non-thrombosed external hemorrhoids found on                             perianal exam.                             - The examination was otherwise normal on direct                             and retroflexion views.                             - No specimens collected.   Recommendation:           - Discharge patient to home.                             - Repeat colonoscopy at 48 years old for screening                             purposes.

## 2017-12-29 NOTE — TELEPHONE ENCOUNTER
Patient given message below.  Verbalized understanding.    Luz Castillo RN,   [facility]

## 2017-12-29 NOTE — TELEPHONE ENCOUNTER
Called patient, left message with phone number to call back.   Gayathri Sotelo RN, [facility]

## 2017-12-29 NOTE — TELEPHONE ENCOUNTER
[facility] Call Center    Phone Message    Name of Caller: Philippe    Phone Number: Cell number on file:    Telephone Information:   Mobile [phone]       Best time to return call: ANY    May a detailed message be left on voicemail: yes    Relation to patient: Self    Reason for Call: Other: Patient wants Christine to call to go over Colonoscopy results with him and discuss stomach issues he has been having     Action Taken: Message routed to:  Primary Care p [phone]

## 2018-02-12 ENCOUNTER — PRE VISIT (OUTPATIENT)
Dept: UROLOGY | Facility: CLINIC | Age: 33
End: 2018-02-12

## 2018-02-12 NOTE — TELEPHONE ENCOUNTER
PREVISIT INFORMATION                                                    Loco Landeros scheduled for future visit at [facility] specialty clinics.    Patient is scheduled to see Dr. Gutierres on 2/14/18  Reason for visit: Testicular mass  Referring provider azar  Has patient seen previous specialist? No  Medical Records:  Available in chart.  Patient was previously seen at a [place] or [facility] facility.     REVIEW                                                      New patient packet mailed to patient: Yes  Medication reconciliation complete: No      Current Outpatient Prescriptions   Medication Sig Dispense Refill     doxycycline Monohydrate 100 MG TABS Take 100 mg by mouth 2 times daily 60 tablet 2     tretinoin (RETIN-A) 0.05 % cream Spread a pea size amount into affected area (face and back) topically at bedtime.  Use sunscreen SPF>20. 45 g 11       Allergies: Review of patient's allergies indicates no known allergies.        PLAN/FOLLOW-UP NEEDED                                                      Previsit review complete.  Patient will see provider at future scheduled appointment.     Patient Reminders Given:  Please, make sure you bring an updated list of your medications.   If you are having a procedure, please, present 15 minutes early.  If you need to cancel or reschedule,please call [phone].    Zonia Fabian

## 2018-02-14 ENCOUNTER — OFFICE VISIT (OUTPATIENT)
Dept: UROLOGY | Facility: CLINIC | Age: 33
End: 2018-02-14
Payer: COMMERCIAL

## 2018-02-14 VITALS
DIASTOLIC BLOOD PRESSURE: 84 MMHG | HEART RATE: 56 BPM | SYSTOLIC BLOOD PRESSURE: 124 MMHG | WEIGHT: 230.8 LBS | OXYGEN SATURATION: 96 % | BODY MASS INDEX: 30.45 KG/M2

## 2018-02-14 DIAGNOSIS — D23.5 BENIGN NEOPLASM OF SKIN OF TRUNK, EXCEPT SCROTUM: Primary | ICD-10-CM

## 2018-02-14 PROCEDURE — 99203 OFFICE O/P NEW LOW 30 MIN: CPT | Performed by: UROLOGY

## 2018-02-14 ASSESSMENT — PAIN SCALES - GENERAL: PAINLEVEL: NO PAIN (0)

## 2018-02-14 NOTE — PROGRESS NOTES
I am seeing Loco Landeros in consultation for evaluation of testis mass.    HPI:  Loco Landeros is a 32 year old male about 1 week ago noted an uncomfortable lump near the left testis- noted on voiding when he felt a little discomfort.  Discomfort is a little less a week later, just coming to get that checked out.    No injury to the area.  Has never had anything like this before.   Otherwise healthy male.    REVIEW OF SYSTEMS:  General: negative  Skin: negative  Eyes: negative  Ears/Nose/Throat: negative  Respiratory: negative  Cardiovascular: negative  Gastrointestinal: negative  Genitourinary: see HPI  Musculoskeletal: negative  Neurologic: negative  Psychiatric: negative  Hematologic/Lymphatic/Immunologic: negative  Endocrine: negative    PAST MEDICAL HX:  No chronic medical problems     PAST SURG HX:  Past Surgical History:   Procedure Laterality Date     COLONOSCOPY WITH CO2 INSUFFLATION N/A 10/2/2017    Procedure: COLONOSCOPY WITH CO2 INSUFFLATION;  Colonoscopy, Vy Argueta, Rectal bleeding, BMI 29.74, [facility] [place] [phone]. ;  Surgeon: Justa Pickard MD;  Location:  OR     NO HISTORY OF SURGERY          FAMILY HX:  Family History   Problem Relation Age of Onset     Prostate Cancer Father      Hypertension Brother      Coronary Artery Disease Maternal Grandfather      DIABETES No family hx of      Hyperlipidemia No family hx of      CEREBROVASCULAR DISEASE No family hx of      Breast Cancer No family hx of      Colon Cancer No family hx of      Depression No family hx of      Anxiety Disorder No family hx of      Thyroid Disease No family hx of      Genetic Disorder No family hx of      Asthma No family hx of        SOCIAL HX:  Social History   Substance Use Topics     Smoking status: Former Smoker     Packs/day: 0.50     Years: 5.00     Types: Cigarettes     Smokeless tobacco: Former User     Types: Chew     Quit date: 1/1/2002      Comment: socially smoker     Alcohol use Yes       Comment: once a week        MEDICATIONS:  Current Outpatient Prescriptions   Medication Sig     doxycycline Monohydrate 100 MG TABS Take 100 mg by mouth 2 times daily     tretinoin (RETIN-A) 0.05 % cream Spread a pea size amount into affected area (face and back) topically at bedtime.  Use sunscreen SPF>20. (Patient not taking: Reported on 2/14/2018)     No current facility-administered medications for this visit.        ALLERGIES:  Review of patient's allergies indicates no known allergies.      GENERAL PHYSICAL EXAM:     /84 (BP Location: Left arm, Patient Position: Sitting, Cuff Size: Adult Large)  Pulse 56  Wt 104.7 kg (230 lb 12.8 oz)  SpO2 96%  BMI 30.45 kg/m2   Constitutional: No acute distress. Well nourished.   PSYCH: normal mood and affect.  NEURO: normal gait, no focal deficits.   EYES: anicteric, EOMI, PERR.  CARDIOPULMONARY: breathing non-labored, pulse regular rate/rhythm, no peripheral edema.  GI: Abdomen soft, nondistended   MUSCULOSKELETAL: normal limb proportions, no muscle wasting, no contractures.  SKIN: Normal virilized hair distribution, no lesions, warts or rashes over genitalia, abdomen extremities or face.  HEME/LYMPH: no ecchymosis, no lymphadenopathy in groin, no lymphedema.     EXAM:  Phallus  circumcised, meatus adequate, no plaques palpated.   Left testis descended , size is 20-22 , consistency is normal . No intra-testicular masses.   Right testis descended , size is 20-22 , consistency is normal . No intra-testicular masses.   Epididymes present, right non-tender, not-enlarged.   There is a pea-sized lump near the tail of left epididymis  That is moderately tender to palpation.  Left cord: Vas present. No obvious varicocele noted.checked standing and supine.  Right cord: Vas present. no varicocele noted. checked standing and supine.    Prostate exam: deferred     Imaging/labs:  Lab Results   Component Value Date    CR 0.91 09/01/2017    CR 0.85 04/29/2015     Lab  Results   Component Value Date    PSA 0.73 09/01/2017    PSA 0.72 04/29/2015     ASSESSMENT:     Painful lump near the tail of the left epididymis.  Differential diagnosis is epididymis cyst, adenomatoid tumor, thrombosed varicocele, or sperm granuloma.    PLAN:  Scrotal ultrasound, and we'll contact him with results.    Geoffrey Gutierres MD     Urological Surgeon

## 2018-02-14 NOTE — NURSING NOTE
"Loco Landeros's goals for this visit include:   Chief Complaint   Patient presents with     Consult     Testicular mass       He requests these members of his care team be copied on today's visit information: Yes    PCP: [facility]    Referring Provider:  Referred Self, MD  No address on file    Chief Complaint   Patient presents with     Consult     Testicular mass       Initial /84 (BP Location: Left arm, Patient Position: Sitting, Cuff Size: Adult Large)  Pulse 56  Wt 104.7 kg (230 lb 12.8 oz)  SpO2 96%  BMI 30.45 kg/m2 Estimated body mass index is 30.45 kg/(m^2) as calculated from the following:    Height as of 9/1/17: 1.854 m (6' 1\").    Weight as of this encounter: 104.7 kg (230 lb 12.8 oz).  Medication Reconciliation: complete    Do you need any medication refills at today's visit? No    "

## 2018-02-14 NOTE — MR AVS SNAPSHOT
After Visit Summary   2/14/2018    Loco Landeros    MRN: [id number]           Patient Information     Date Of Birth          1985        Visit Information        Provider Department      2/14/2018 11:30 AM Geoffrey Gutierres MD [facility]        Today's Diagnoses     Benign neoplasm of skin of trunk, except scrotum    -  1       Follow-ups after your visit        Your next 10 appointments already scheduled     Jun 19, 2018 12:45 PM CDT   Return Visit with Ricarda Barba MD   [facility] ([facility]    [address] [place] [id number] [phone]              Future tests that were ordered for you today     Open Future Orders        Priority Expected Expires Ordered    US Testicular and Scrotum Routine  2/14/2019 2/14/2018            Who to contact     If you have questions or need follow up information about today's clinic visit or your schedule please contact [facility] directly at [phone].  Normal or non-critical lab and imaging results will be communicated to you by Fast [employer] Diagnosticshart, letter or phone within 4 business days after the clinic has received the results. If you do not hear from us within 7 days, please contact the clinic through Fast [employer] Diagnosticshart or phone. If you have a critical or abnormal lab result, we will notify you by phone as soon as possible.  Submit refill requests through Chance (app) or call your pharmacy and they will forward the refill request to us. Please allow 3 business days for your refill to be completed.          Additional Information About Your Visit        MyChart Information     Chance (app) is an electronic gateway that provides easy, online access to your medical records. With Chance (app), you can request a clinic appointment, read your test results, renew a prescription or communicate with your care team.     To sign up for Chance (app) visit the website at [email or web address]   You will be  asked to enter the access code listed below, as well as some personal information. Please follow the directions to create your username and password.     Your access code is: [email or web address]  Expires: 5/15/2018 12:07 PM     Your access code will  in 90 days. If you need help or a new code, please contact your [facility] or call [phone] for assistance.        Care EveryWhere ID     This is your Care EveryWhere ID. This could be used by other organizations to access your [place] medical records  [id number]        Your Vitals Were     Pulse Pulse Oximetry BMI (Body Mass Index)             56 96% 30.45 kg/m2          Blood Pressure from Last 3 Encounters:   18 124/84   10/02/17 (!) 127/92   17 102/65    Weight from Last 3 Encounters:   18 104.7 kg (230 lb 12.8 oz)   17 102.2 kg (225 lb 6.4 oz)   16 105.7 kg (233 lb 1.6 oz)               Primary Care Provider Office Phone # Fax #    [facility] [phone]       [address]  [facility] [address]        Equal Access to Services     STEFANIA TAYLOR AH: Hadii aad ku hadasho Soomaali, waaxda luqadaha, qaybta kaalmada adeegyada, waxay idiin hayaan adeeg kharaannita la'daven . So [facility] [phone].    ATENCIÓN: Si habla español, tiene a torrez disposición servicios gratuitos de asistencia lingüística. Llame al [phone].    We comply with applicable federal civil rights laws and [place] laws. We do not discriminate on the basis of race, color, national origin, age, disability, sex, sexual orientation, or gender identity.            Thank you!     Thank you for choosing [facility]  for your care. Our goal is always to provide you with excellent care. Hearing back from our patients is one way we can continue to improve our services. Please take a few minutes to complete the written survey that you may receive in the mail after your visit with us. Thank  you!             Your Updated Medication List - Protect others around you: Learn how to safely use, store and throw away your medicines at www.disposemymeds.org.          This list is accurate as of 2/14/18 12:07 PM.  Always use your most recent med list.                   Brand Name Dispense Instructions for use Diagnosis    doxycycline Monohydrate 100 MG Tabs     60 tablet    Take 100 mg by mouth 2 times daily    Acne vulgaris       tretinoin 0.05 % cream    RETIN-A    45 g    Spread a pea size amount into affected area (face and back) topically at bedtime.  Use sunscreen SPF>20.    Acne vulgaris

## 2018-02-16 ENCOUNTER — RADIANT APPOINTMENT (OUTPATIENT)
Dept: ULTRASOUND IMAGING | Facility: CLINIC | Age: 33
End: 2018-02-16
Attending: UROLOGY
Payer: COMMERCIAL

## 2018-02-16 DIAGNOSIS — D23.5 BENIGN NEOPLASM OF SKIN OF TRUNK, EXCEPT SCROTUM: ICD-10-CM

## 2018-02-16 PROCEDURE — 76870 US EXAM SCROTUM: CPT | Performed by: STUDENT IN AN ORGANIZED HEALTH CARE EDUCATION/TRAINING PROGRAM

## 2018-03-03 ENCOUNTER — NURSE TRIAGE (OUTPATIENT)
Dept: NURSING | Facility: CLINIC | Age: 33
End: 2018-03-03

## 2018-03-03 ENCOUNTER — TELEPHONE (OUTPATIENT)
Dept: PEDIATRICS | Facility: CLINIC | Age: 33
End: 2018-03-03

## 2018-03-03 NOTE — TELEPHONE ENCOUNTER
Patient hadn't received a call about the ultrasound results of his testicle. He was read the results and would like a call back because he has pain with this lump and is wondering what to do about that? What are his next steps for treatment? Should it be removed? What are his options for improvement? Please call him.  You may leave a detailed message.  Christine Estrada RN-[facility]

## 2018-03-03 NOTE — TELEPHONE ENCOUNTER
Patient calling for results. No one had contacted him. His question is what to do about the testicular pain that continues since it's benign, what are his next steps, etc. Encounter sent to the clinic for them to call him.  Christine Estrada RN-[facility]

## 2018-03-05 NOTE — TELEPHONE ENCOUNTER
Received voicemail from patient who was requesting a return call to [phone].    Returned call and spoke to patient who is aware of Dr. Gutierres's recommendations below. Patient reports that the pain has subsided, but on occasion he does have some discomfort. Patient is wondering if he decides to have a vasectomy in the future if he could have the mass removed at the same time. Informed patient that that may be possible and encouraged patient to set up a follow up visit with Dr. Gutierres in the future to discuss surgery in detail if needed. Patient verbalized understanding and will call with any questions or concerns in the future.    Lianne Nayak RN, BSN

## 2018-03-05 NOTE — TELEPHONE ENCOUNTER
Discussed note below with Dr. Gutierres who is in clinic today. Per Dr. Gutierres, if patient is not done with fertility then it is recommended to avoid surgery as surgery may cause scarring of the area. Patient could do NSAIDS and support for discomfort. Per Dr. Gutierres, if the pain is bothersome enough and patient is done having children, then surgery could be set up. Per Dr. Gutierres, because it is a sensitive area, the surgery might not make the discomfort better. Per Dr. Gutierres, he would be happy to see the patient back in clinic to discuss further.    Returned call to patient, but no answer. Per note below, okay to leave a message. Left message for patient stating that Dr. Gutierres recommends NSAIDS such as ibuprofen and support for the discomfort. Informed patient that Dr. Gutierres had additional recommendations for writer to discuss with him over the phone and writer requested for patient to return call to clinic to discuss further.    Lianne Nayak RN, BSN

## 2018-04-02 DIAGNOSIS — L70.0 ACNE VULGARIS: ICD-10-CM

## 2018-04-02 RX ORDER — DOXYCYCLINE 100 MG/1
100 TABLET ORAL 2 TIMES DAILY
Qty: 60 TABLET | Refills: 2 | Status: SHIPPED | OUTPATIENT
Start: 2018-04-02 | End: 2018-06-13

## 2018-04-13 ENCOUNTER — TELEPHONE (OUTPATIENT)
Dept: DERMATOLOGY | Facility: CLINIC | Age: 33
End: 2018-04-13

## 2018-04-13 NOTE — TELEPHONE ENCOUNTER
Patient called questioning being on Doxycycline for Acne for a long period of time due to he has heard that being on antibiotics for long periods of time is not good. Patient states he stated Doxy in November 2017 to which writer informed patient we have many patients on Doxy for acne treatment for long periods of time but that this is not a life long treatment. Informed patient he is ok to continue on Doxy until his follow up with Dr. Barba in June as long as he does not feel he has any side effects from the medication and that Dr. Barba will reassess in June on treatment for acne. Patient had no further questions or concerns at this time.     Bessie Knutson LPN

## 2018-06-13 ENCOUNTER — TELEPHONE (OUTPATIENT)
Dept: DERMATOLOGY | Facility: CLINIC | Age: 33
End: 2018-06-13

## 2018-06-13 DIAGNOSIS — L70.0 ACNE VULGARIS: ICD-10-CM

## 2018-06-13 RX ORDER — DOXYCYCLINE 100 MG/1
100 TABLET ORAL 2 TIMES DAILY
Qty: 60 TABLET | Refills: 0 | Status: SHIPPED | OUTPATIENT
Start: 2018-06-13 | End: 2018-07-05

## 2018-06-13 NOTE — TELEPHONE ENCOUNTER
[facility] Call Center    Phone Message    May a detailed message be left on voicemail: yes    Reason for Call: Other: patient finished his antibiotic and asked if he should continue any other medication? please advise     Action Taken: Message routed to:  Adult Clinics: Dermatology p [phone]

## 2018-06-13 NOTE — TELEPHONE ENCOUNTER
I left a message for patient to call [facility].  Sagrario Salas RN

## 2018-06-13 NOTE — TELEPHONE ENCOUNTER
Loco returned call.  Appt rescheduled to 7/5/18.  Doxycycline refilled as patient is out.    Sagrario Salas RN

## 2018-07-05 ENCOUNTER — OFFICE VISIT (OUTPATIENT)
Dept: DERMATOLOGY | Facility: CLINIC | Age: 33
End: 2018-07-05
Payer: COMMERCIAL

## 2018-07-05 ENCOUNTER — TELEPHONE (OUTPATIENT)
Dept: PEDIATRICS | Facility: CLINIC | Age: 33
End: 2018-07-05

## 2018-07-05 DIAGNOSIS — L70.0 ACNE VULGARIS: Primary | ICD-10-CM

## 2018-07-05 PROCEDURE — 99213 OFFICE O/P EST LOW 20 MIN: CPT | Performed by: DERMATOLOGY

## 2018-07-05 RX ORDER — TRETINOIN 0.5 MG/G
CREAM TOPICAL
Qty: 45 G | Refills: 11 | Status: SHIPPED | OUTPATIENT
Start: 2018-07-05 | End: 2018-12-07

## 2018-07-05 RX ORDER — DOXYCYCLINE 40 MG/1
40 CAPSULE ORAL DAILY
Qty: 112 CAPSULE | Refills: 1 | Status: SHIPPED | OUTPATIENT
Start: 2018-07-05 | End: 2019-01-11

## 2018-07-05 RX ORDER — CLINDAMYCIN PHOSPHATE 10 MG/G
GEL TOPICAL 2 TIMES DAILY
Qty: 120 G | Refills: 11 | Status: SHIPPED | OUTPATIENT
Start: 2018-07-05 | End: 2019-01-11

## 2018-07-05 NOTE — MR AVS SNAPSHOT
"              After Visit Summary   7/5/2018    Loco Landeros    MRN: [id number]           Patient Information     Date Of Birth          1985        Visit Information        Provider Department      7/5/2018 10:45 AM Ricarda Barba MD [facility]        Today's Diagnoses     Acne vulgaris    -  1      Care Instructions    1. Start Benzoyl peroxide wash once daily  2. Start Clindamycin gel to face and back once daily  3. Continue tretinoin, pea sized amount to face nightly as tolerated    If you start to flare after 4-6 weeks off doxycycline, go to the pharmacy and ask for the \"new doxycycline.\" It is called oracea  If this is too expensive-call the derm clinic for old doxycycline prescription    Plan for accutane January 2019      Topical Retinoids    What are topical retinoids?    These are medicines that are related to Vitamin A. They are used on the skin.    Retin-A , Renova , Differin , and Tazorac  are brand names.    Come in creams and clear gels    Used to treat skin conditions like pimples (acne), face wrinkling, or dark-colored sunspots    How do I use these medicines?    Wash face and let dry for 15 to 30 minutes.    Use a large pea-size amount of medicine to cover the whole face. Do not put on close to the eyes and lips. Rub in gently.     Start by using every other day. If you have no irritation after a few days, start to use it daily.     You might have too much irritation with daily use. Use it less often until the irritation goes away. Then try to increase slowly to daily use.     Irritation improves over time.    You may use moisturizer if your skin becomes dry. Look for  non-comedogenic  (non-pore plugging) and oil free products.     What are the side effects?    Dryness     Peeling and flaking     Irritation of the skin     Possible increased chance of sunburns. Protect your skin from sunlight. Wear a hat and use a sunscreen with SPF 30 or higher. Your sunscreen should " have both UVA and UVB (broad-spectrum) protection.    Who should I call with questions?    [facility]: [phone]     [facility]: [phone]    For urgent needs outside of business hours call the [facility] at [phone] and ask for the dermatology resident on call    Start over-the-counter benzoyl peroxide 10% wash on the face and back.  If 10% is too irritating you can use the 5%. (Clean[employer]Clear makes this product. It is available here at the pharmacy or at [facility]). This medication can bleach your towels and clothing.     It is found in a purple tube in the acne aisle.                       Follow-ups after your visit        Follow-up notes from your care team     Return for Junary 2019.      Your next 10 appointments already scheduled     Jan 11, 2019  4:30 PM CST   Return Visit with Ricarda [facility]ba MD   [facility]    [address] [place] [address] [phone]              Who to contact     If you have questions or need follow up information about today's clinic visit or your schedule please contact [facility] directly at [phone].  Normal or non-critical lab and imaging results will be communicated to you by Lakoohart, letter or phone within 4 business days after the clinic has received the results. If you do not hear from us within 7 days, please contact the clinic through Lakoohart or phone. If you have a critical or abnormal lab result, we will notify you by phone as soon as possible.  Submit refill requests through Kreix or call your pharmacy and they will forward the refill request to us. Please allow 3 business days for your refill to be completed.          Additional Information About Your Visit        Kreix Information     Kreix gives you secure access to your electronic health record. If you see a primary care provider, you can also send  messages to your care team and make appointments. If you have questions, please call your primary care clinic.  If you do not have a primary care provider, please call [phone] and they will assist you.      [employer] is an electronic gateway that provides easy, online access to your medical records. With [employer], you can request a clinic appointment, read your test results, renew a prescription or communicate with your care team.     To access your existing account, please contact your [facility] or call [phone] for assistance.        Care EveryWhere ID     This is your Care EveryWhere ID. This could be used by other organizations to access your [place] medical records  [id number]         Blood Pressure from Last 3 Encounters:   02/14/18 124/84   10/02/17 (!) 127/92   09/01/17 102/65    Weight from Last 3 Encounters:   02/14/18 104.7 kg (230 lb 12.8 oz)   09/01/17 102.2 kg (225 lb 6.4 oz)   02/08/16 105.7 kg (233 lb 1.6 oz)              Today, you had the following     No orders found for display         Today's Medication Changes          These changes are accurate as of 7/5/18 12:47 PM.  If you have any questions, ask your nurse or doctor.               Start taking these medicines.        Dose/Directions    clindamycin 1 % topical gel   Commonly known as:  CLINDAMAX   Used for:  Acne vulgaris   Started by:  Ricarda Barba MD        Apply topically 2 times daily Apply once daily to the face and back   Quantity:  120 g   Refills:  11       doxycycline (Rosacea) 40 MG Cpdr CR capsule   Commonly known as:  ORACEA   Used for:  Acne vulgaris   Started by:  Ricarda Barba MD        Dose:  40 mg   Take 1 capsule (40 mg) by mouth daily   Quantity:  112 capsule   Refills:  1         Stop taking these medicines if you haven't already. Please contact your care team if you have questions.     doxycycline Monohydrate 100 MG Tabs   Stopped by:  Ricarda Barba MD                Where to get  your medicines      These medications were sent to [facility]/pharmacy #[id number] - [facility] [address] [place] AT [facility]  [address] [facility] [address]     Phone:  [phone]     clindamycin 1 % topical gel    doxycycline (Rosacea) 40 MG Cpdr CR capsule    tretinoin 0.05 % cream                Primary Care Provider Office Phone # Fax #    [facility] [phone] [address]  [facility] [address]        Equal Access to Services     [facility]: Hadii aad ku hadasho Soomaali, waaxda luqadaha, qaybta kaalmada adeegyada, laurence fofana hayteresa tyson . So [facility] [phone].    ATENCIÓN: Si habla español, tiene a torrez disposición servicios gratuitos de asistencia lingüística. [facility] [phone].    We comply with applicable federal civil rights laws and [place] laws. We do not discriminate on the basis of race, color, national origin, age, disability, sex, sexual orientation, or gender identity.            Thank you!     Thank you for choosing [facility]  for your care. Our goal is always to provide you with excellent care. Hearing back from our patients is one way we can continue to improve our services. Please take a few minutes to complete the written survey that you may receive in the mail after your visit with us. Thank you!             Your Updated Medication List - Protect others around you: Learn how to safely use, store and throw away your medicines at www.disposemymeds.org.          This list is accurate as of 7/5/18 12:47 PM.  Always use your most recent med list.                   Brand Name Dispense Instructions for use Diagnosis    clindamycin 1 % topical gel    CLINDAMAX    120 g    Apply topically 2 times daily Apply once daily to the face and back    Acne vulgaris       doxycycline (Rosacea) 40 MG Cpdr CR capsule    ORACEA    112 capsule    Take 1 capsule (40 mg) by mouth daily     Acne vulgaris       tretinoin 0.05 % cream    RETIN-A    45 g    Spread a pea size amount into affected area (face and back) topically at bedtime.  Use sunscreen SPF>20.    Acne vulgaris

## 2018-07-05 NOTE — PROGRESS NOTES
[facility] Dermatology Note      Dermatology Problem List:  1. Acne vulgaris  -Previous Tx: doxy   -Current Tx: clindamycin, BPO wash, tretinoin.     CC:   Chief Complaint   Patient presents with     Acne     Follow up - seeing improvement       Encounter Date: Jul 5, 2018    History of Present Illness:  Mr. Loco Landeros is a 33 year old male who presents for evaluation of folliculitis and acne. The patient was last seen 9/14/2017 The patient reporst that he has been taking precautions for the sun sensitivity. The patient reports that the back is clear, but he still has some acne on the face. He has been using the doxy and not the tretinoin. He reports he has not used other antibiotics. He is thinning of starting this for the face.       Past Medical History:   Patient Active Problem List   Diagnosis     Degeneration of lumbar or lumbosacral intervertebral disc     Family history of malignant neoplasm of prostate     Low back pain     Nicotine use disorder     Past Medical History:   Diagnosis Date     NO ACTIVE PROBLEMS      Past Surgical History:   Procedure Laterality Date     COLONOSCOPY WITH CO2 INSUFFLATION N/A 10/2/2017    Procedure: COLONOSCOPY WITH CO2 INSUFFLATION;  Colonoscopy, Vy Argueta, Rectal bleeding, BMI 29.74, [facility] [place] [phone]. ;  Surgeon: Justa Pickard MD;  Location: [facility] OR     NO HISTORY OF SURGERY         Social History:  The patient works as a  for [employer].   He does drink 3-6 alcoholic beverages a week. He does not smoke or chew.     Family History:  No family hx of skin cancer.     Medications:  Current Outpatient Prescriptions   Medication Sig Dispense Refill     doxycycline Monohydrate 100 MG TABS Take 100 mg by mouth 2 times daily 60 tablet 0     tretinoin (RETIN-A) 0.05 % cream Spread a pea size amount into affected area (face and back) topically at bedtime.  Use sunscreen SPF>20. 45 g 11     No Known  Allergies      Review of Systems:  -Constitutional: The patient denies fatigue, fevers, chills, unintended weight loss, and night sweats.  -Skin: As above in HPI. No additional skin concerns.    Physical exam:  Vitals: There were no vitals taken for this visit.  GEN: This is a well developed, well-nourished male in no acute distress, in a pleasant mood.    SKIN: Acne exam, which includes the face, neck, upper central chest, and upper central back was performed.  -x3 acneiform papules on the chin   -Acneiform nodule on the right chin  -Upper back and chest is clear   -No other lesions of concern on areas examined.     Impression/Plan:  1. Acne vulgaris-no folliculitis today    Start clindamycin 1% gel once daily to the back and the face.     Start BPO wash to the face and back once daily.     If acne returns in 4 weeks, we have given script oft Oracea if flare occurs, call pharmacy to fill this if this occur.     Plan for Accutane after Jan when his scheduled allows. He is not able today    Continue tretinoin 0.05% cream to face. Use only pea sized amount on the face.Use at night    Follow-up in Jan, call for any falres, earlier for new or changing lesions.     Staff Involved:  Staff/Scribe     Scribe Disclosure:   I, Selene Crisostomo, am serving as a scribe to document services personally performed by Dr. Ricarda Barba, based on data collection and the provider's statements to me.     Provider Disclosure:   The documentation recorded by the scribe accurately reflects the services I personally performed and the decisions made by me.    Ricarda Barba MD    Department of Dermatology  [facility]: Phone: [phone], Fax:[phone]  [facility] [place]: Phone: [phone], Fax: [phone]

## 2018-07-05 NOTE — PATIENT INSTRUCTIONS
"1. Start Benzoyl peroxide wash once daily  2. Start Clindamycin gel to face and back once daily  3. Continue tretinoin, pea sized amount to face nightly as tolerated    If you start to flare after 4-6 weeks off doxycycline, go to the pharmacy and ask for the \"new doxycycline.\" It is called oracea  If this is too expensive-call the derm clinic for old doxycycline prescription    Plan for accutane January 2019      Topical Retinoids    What are topical retinoids?    These are medicines that are related to Vitamin A. They are used on the skin.    Retin-A , Renova , Differin , and Tazorac  are brand names.    Come in creams and clear gels    Used to treat skin conditions like pimples (acne), face wrinkling, or dark-colored sunspots    How do I use these medicines?    Wash face and let dry for 15 to 30 minutes.    Use a large pea-size amount of medicine to cover the whole face. Do not put on close to the eyes and lips. Rub in gently.     Start by using every other day. If you have no irritation after a few days, start to use it daily.     You might have too much irritation with daily use. Use it less often until the irritation goes away. Then try to increase slowly to daily use.     Irritation improves over time.    You may use moisturizer if your skin becomes dry. Look for  non-comedogenic  (non-pore plugging) and oil free products.     What are the side effects?    Dryness     Peeling and flaking     Irritation of the skin     Possible increased chance of sunburns. Protect your skin from sunlight. Wear a hat and use a sunscreen with SPF 30 or higher. Your sunscreen should have both UVA and UVB (broad-spectrum) protection.    Who should I call with questions?    [facility]: [phone]     [facility]: [phone]    For urgent needs outside of business hours call the [facility] at [phone] and ask for the dermatology resident on call    Start " over-the-counter benzoyl peroxide 10% wash on the face and back.  If 10% is too irritating you can use the 5%. ([employer] makes this product. It is available here at the pharmacy or at [facility]). This medication can bleach your towels and clothing.     It is found in a purple tube in the acne aisle.

## 2018-07-05 NOTE — NURSING NOTE
@Loco Landeros's goals for this visit include:    Chief Complaint   Patient presents with     Acne     Follow up - seeing improvement       He requests these members of his care team be copied on today's visit information: NO    PCP: [facility]    Referring Provider:  No referring provider defined for this encounter.    There were no vitals taken for this visit.    Do you need any medication refills at today's visit? NO    Zonia Quinn CMA

## 2018-07-05 NOTE — TELEPHONE ENCOUNTER
Pharmacy notified that that directions should say to apply once daily to face and back.  Sagrario Salas RN

## 2018-07-05 NOTE — LETTER
7/5/2018         RE: Loco Landeros  [address]  [facility] [address]        Dear Colleague,    Thank you for referring your patient, Loco Landeros, to the [facility]. Please see a copy of my visit note below.    [facility] Dermatology Note      Dermatology Problem List:  1. Acne vulgaris  -Previous Tx: doxy   -Current Tx: clindamycin, BPO wash, tretinoin.     CC:   Chief Complaint   Patient presents with     Acne     Follow up - seeing improvement       Encounter Date: Jul 5, 2018    History of Present Illness:  Mr. Loco Landeros is a 33 year old male who presents for evaluation of folliculitis and acne. The patient was last seen 9/14/2017 The patient reporst that he has been taking precautions for the sun sensitivity. The patient reports that the back is clear, but he still has some acne on the face. He has been using the doxy and not the tretinoin. He reports he has not used other antibiotics. He is thinning of starting this for the face.       Past Medical History:   Patient Active Problem List   Diagnosis     Degeneration of lumbar or lumbosacral intervertebral disc     Family history of malignant neoplasm of prostate     Low back pain     Nicotine use disorder     Past Medical History:   Diagnosis Date     NO ACTIVE PROBLEMS      Past Surgical History:   Procedure Laterality Date     COLONOSCOPY WITH CO2 INSUFFLATION N/A 10/2/2017    Procedure: COLONOSCOPY WITH CO2 INSUFFLATION;  Colonoscopy, Vy Argueta, Rectal bleeding, BMI 29.74, [facility] [place] [phone]. ;  Surgeon: Justa Pickard MD;  Location:  OR     NO HISTORY OF SURGERY         Social History:  The patient works as a  for [employer].   He does drink 3-6 alcoholic beverages a week. He does not smoke or chew.     Family History:  No family hx of skin cancer.     Medications:  Current Outpatient Prescriptions   Medication Sig Dispense Refill      doxycycline Monohydrate 100 MG TABS Take 100 mg by mouth 2 times daily 60 tablet 0     tretinoin (RETIN-A) 0.05 % cream Spread a pea size amount into affected area (face and back) topically at bedtime.  Use sunscreen SPF>20. 45 g 11     No Known Allergies      Review of Systems:  -Constitutional: The patient denies fatigue, fevers, chills, unintended weight loss, and night sweats.  -Skin: As above in HPI. No additional skin concerns.    Physical exam:  Vitals: There were no vitals taken for this visit.  GEN: This is a well developed, well-nourished male in no acute distress, in a pleasant mood.    SKIN: Acne exam, which includes the face, neck, upper central chest, and upper central back was performed.  -x3 acneiform papules on the chin   -Acneiform nodule on the right chin  -Upper back and chest is clear   -No other lesions of concern on areas examined.     Impression/Plan:  1. Acne vulgaris-no folliculitis today    Start clindamycin 1% gel once daily to the back and the face.     Start BPO wash to the face and back once daily.     If acne returns in 4 weeks, we have given script oft Oracea if flare occurs, call pharmacy to fill this if this occur.     Plan for Accutane after Jan when his scheduled allows. He is not able today    Continue tretinoin 0.05% cream to face. Use only pea sized amount on the face.Use at night    Follow-up in Jan, call for any falres, earlier for new or changing lesions.     Staff Involved:  Staff/Scribe     Scribe Disclosure:   I, Selene Crisostomo, am serving as a scribe to document services personally performed by Dr. Ricarda Barba, based on data collection and the provider's statements to me.     Provider Disclosure:   The documentation recorded by the scribe accurately reflects the services I personally performed and the decisions made by me.    Ricarda Barba MD    Department of Dermatology  [facility]: Phone:  [phone], Fax:[phone]  [facility]: Phone: [phone], Fax: [phone]        Again, thank you for allowing me to participate in the care of your patient.        Sincerely,        Ricarda Barba MD

## 2018-07-05 NOTE — TELEPHONE ENCOUNTER
[facility] Call Center    Phone Message    May a detailed message be left on voicemail: yes    Reason for Call: Medication Question or concern regarding medication   Prescription Clarification  Name of Medication: clindamycin (CLINDAMAX) 1 % topical gel [64045] (Order [id number])     Prescribing Provider: Dr. Barba   [facility]   What on the order needs clarification? Clarification on dosing          Action Taken: Message routed to:  Adult Clinics: Dermatology p [id number]

## 2018-12-07 ENCOUNTER — OFFICE VISIT (OUTPATIENT)
Dept: PEDIATRICS | Facility: CLINIC | Age: 33
End: 2018-12-07
Payer: COMMERCIAL

## 2018-12-07 VITALS
TEMPERATURE: 96.2 F | BODY MASS INDEX: 31.2 KG/M2 | HEART RATE: 63 BPM | HEIGHT: 73 IN | DIASTOLIC BLOOD PRESSURE: 70 MMHG | OXYGEN SATURATION: 96 % | WEIGHT: 235.4 LBS | SYSTOLIC BLOOD PRESSURE: 100 MMHG

## 2018-12-07 DIAGNOSIS — Z13.29 SCREENING FOR THYROID DISORDER: ICD-10-CM

## 2018-12-07 DIAGNOSIS — Z80.42 FAMILY HISTORY OF MALIGNANT NEOPLASM OF PROSTATE: ICD-10-CM

## 2018-12-07 DIAGNOSIS — Z13.6 CARDIOVASCULAR SCREENING; LDL GOAL LESS THAN 160: ICD-10-CM

## 2018-12-07 DIAGNOSIS — Z00.00 ENCOUNTER FOR ROUTINE ADULT HEALTH EXAMINATION WITHOUT ABNORMAL FINDINGS: Primary | ICD-10-CM

## 2018-12-07 DIAGNOSIS — Z12.5 SCREENING FOR PROSTATE CANCER: ICD-10-CM

## 2018-12-07 DIAGNOSIS — Z13.1 SCREENING FOR DIABETES MELLITUS: ICD-10-CM

## 2018-12-07 LAB
ALBUMIN SERPL-MCNC: 3.7 G/DL (ref 3.4–5)
ALP SERPL-CCNC: 52 U/L (ref 40–150)
ALT SERPL W P-5'-P-CCNC: 28 U/L (ref 0–70)
ANION GAP SERPL CALCULATED.3IONS-SCNC: 5 MMOL/L (ref 3–14)
AST SERPL W P-5'-P-CCNC: 17 U/L (ref 0–45)
BILIRUB SERPL-MCNC: 0.7 MG/DL (ref 0.2–1.3)
BUN SERPL-MCNC: 11 MG/DL (ref 7–30)
CALCIUM SERPL-MCNC: 8.5 MG/DL (ref 8.5–10.1)
CHLORIDE SERPL-SCNC: 110 MMOL/L (ref 94–109)
CHOLEST SERPL-MCNC: 195 MG/DL
CO2 SERPL-SCNC: 26 MMOL/L (ref 20–32)
CREAT SERPL-MCNC: 0.96 MG/DL (ref 0.66–1.25)
GFR SERPL CREATININE-BSD FRML MDRD: 89 ML/MIN/1.7M2
GLUCOSE SERPL-MCNC: 82 MG/DL (ref 70–99)
HDLC SERPL-MCNC: 60 MG/DL
LDLC SERPL CALC-MCNC: 107 MG/DL
NONHDLC SERPL-MCNC: 135 MG/DL
POTASSIUM SERPL-SCNC: 4.3 MMOL/L (ref 3.4–5.3)
PROT SERPL-MCNC: 7.1 G/DL (ref 6.8–8.8)
PSA SERPL-ACNC: 0.72 UG/L (ref 0–4)
SODIUM SERPL-SCNC: 141 MMOL/L (ref 133–144)
TRIGL SERPL-MCNC: 140 MG/DL
TSH SERPL DL<=0.005 MIU/L-ACNC: 1.71 MU/L (ref 0.4–4)

## 2018-12-07 PROCEDURE — 84443 ASSAY THYROID STIM HORMONE: CPT | Performed by: NURSE PRACTITIONER

## 2018-12-07 PROCEDURE — G0103 PSA SCREENING: HCPCS | Performed by: NURSE PRACTITIONER

## 2018-12-07 PROCEDURE — 36415 COLL VENOUS BLD VENIPUNCTURE: CPT | Performed by: NURSE PRACTITIONER

## 2018-12-07 PROCEDURE — 80061 LIPID PANEL: CPT | Performed by: NURSE PRACTITIONER

## 2018-12-07 PROCEDURE — 99395 PREV VISIT EST AGE 18-39: CPT | Performed by: NURSE PRACTITIONER

## 2018-12-07 PROCEDURE — 80053 COMPREHEN METABOLIC PANEL: CPT | Performed by: NURSE PRACTITIONER

## 2018-12-07 NOTE — NURSING NOTE
"Chief Complaint   Patient presents with     Physical     ABDON       Initial /70 (BP Location: Right arm, Patient Position: Sitting, Cuff Size: Adult Large)  Pulse 63  Temp 96.2  F (35.7  C) (Temporal)  Ht 6' 0.5\" (1.842 m)  Wt 235 lb 6.4 oz (106.8 kg)  SpO2 96%  BMI 31.49 kg/m2 Estimated body mass index is 31.49 kg/(m^2) as calculated from the following:    Height as of this encounter: 6' 0.5\" (1.842 m).    Weight as of this encounter: 235 lb 6.4 oz (106.8 kg).  Medication Reconciliation: complete      MOR Mo      "

## 2018-12-07 NOTE — PROGRESS NOTES
SUBJECTIVE:   CC: Loco Landeros is an 33 year old male who presents for preventive health visit.     Healthy Habits:    Do you get at least three servings of calcium containing foods daily (dairy, green leafy vegetables, etc.)? no, taking calcium and/or vitamin D supplement: no    Amount of exercise or daily activities, outside of work: 3 day(s) per week    Problems taking medications regularly No    Medication side effects: No    Have you had an eye exam in the past two years? no    Do you see a dentist twice per year? yes    Do you have sleep apnea, excessive snoring or daytime drowsiness?no      Today's PHQ-2 Score:   PHQ-2 ( 1999 Pfizer) 12/7/2018 9/1/2017   Q1: Little interest or pleasure in doing things 0 0   Q2: Feeling down, depressed or hopeless 0 0   PHQ-2 Score 0 0       Abuse: Current or Past(Physical, Sexual or Emotional)- No  Do you feel safe in your environment? Yes    Social History   Substance Use Topics     Smoking status: Former Smoker     Packs/day: 0.50     Years: 5.00     Types: Cigarettes     Smokeless tobacco: Former User     Types: Chew     Quit date: 1/1/2002      Comment: socially smoker     Alcohol use Yes      Comment: once a week      If you drink alcohol do you typically have >3 drinks per day or >7 drinks per week? No                      Last PSA:   PSA   Date Value Ref Range Status   09/01/2017 0.73 0 - 4 ug/L Final     Comment:     Assay Method:  Chemiluminescence using Siemens Vista analyzer       Reviewed orders with patient. Reviewed health maintenance and updated orders accordingly - Yes      Reviewed and updated as needed this visit by clinical staff  Tobacco  Allergies  Meds  Med Hx  Surg Hx  Fam Hx  Soc Hx        Reviewed and updated as needed this visit by Provider        Past Medical History:   Diagnosis Date     NO ACTIVE PROBLEMS       Past Surgical History:   Procedure Laterality Date     COLONOSCOPY WITH CO2 INSUFFLATION N/A 10/2/2017    Procedure:  "COLONOSCOPY WITH CO2 INSUFFLATION;  Colonoscopy, Vy Argueta, Rectal bleeding, BMI 29.74, [facility] [place] [phone]. ;  Surgeon: Justa Pickard MD;  Location:  OR     NO HISTORY OF SURGERY         ROS:  CONSTITUTIONAL:NEGATIVE for fever, chills, change in weight  INTEGUMENTARY/SKIN: NEGATIVE for worrisome rashes, moles or lesions  EYES: NEGATIVE for vision changes or irritation  ENT: NEGATIVE for ear, mouth and throat problems  RESP:NEGATIVE for significant cough or SOB  CV: NEGATIVE for chest pain, palpitations or peripheral edema. Does have occasional palpitations but does drink 2 or 3 cups a day   GI: NEGATIVE for nausea, abdominal pain, heartburn, or change in bowel habits   male: negative for dysuria, hematuria, decreased urinary stream, erectile dysfunction, urethral discharge  MUSCULOSKELETAL:NEGATIVE for significant arthralgias or myalgia  NEURO: NEGATIVE for weakness, dizziness or paresthesias  ENDOCRINE: NEGATIVE for temperature intolerance, skin/hair changes  HEME/ALLERGY/IMMUNE: NEGATIVE for bleeding problems  PSYCHIATRIC: NEGATIVE for changes in mood or affect    OBJECTIVE:   /70 (BP Location: Right arm, Patient Position: Sitting, Cuff Size: Adult Large)  Pulse 63  Temp 96.2  F (35.7  C) (Temporal)  Ht 6' 0.5\" (1.842 m)  Wt 235 lb 6.4 oz (106.8 kg)  SpO2 96%  BMI 31.49 kg/m2   Wt Readings from Last 4 Encounters:   12/07/18 235 lb 6.4 oz (106.8 kg)   02/14/18 230 lb 12.8 oz (104.7 kg)   09/01/17 225 lb 6.4 oz (102.2 kg)   02/08/16 233 lb 1.6 oz (105.7 kg)       EXAM:  GENERAL: healthy, alert and no distress  EYES: Eyes grossly normal to inspection and conjunctivae and sclerae normal  HENT: ear canals and TM's normal, nose and mouth without ulcers or lesions  NECK: no adenopathy, no asymmetry, masses, or scars and thyroid normal to palpation  RESP: lungs clear to auscultation - no rales, rhonchi or wheezes  CV: regular rates and rhythm, no murmur, click or rub, peripheral " pulses strong and no peripheral edema  ABDOMEN: soft, nontender, no hepatosplenomegaly, no masses and bowel sounds normal   (male): normal male genitalia without lesions or urethral discharge, no hernia  MS: no gross musculoskeletal defects noted, no edema  SKIN: no suspicious lesions or rashes  NEURO: Normal strength and tone, mentation intact and speech normal  PSYCH: mentation appears normal, affect normal/bright  LYMPH: no cervical, supraclavicular, axillary, or inguinal adenopathy    Diagnostic Test Results:  Results for orders placed or performed in visit on 12/07/18   Lipid panel reflex to direct LDL Fasting   Result Value Ref Range    Cholesterol 195 <200 mg/dL    Triglycerides 140 <150 mg/dL    HDL Cholesterol 60 >39 mg/dL    LDL Cholesterol Calculated 107 (H) <100 mg/dL    Non HDL Cholesterol 135 (H) <130 mg/dL   Prostate spec antigen screen   Result Value Ref Range    PSA 0.72 0 - 4 ug/L   Comprehensive metabolic panel   Result Value Ref Range    Sodium 141 133 - 144 mmol/L    Potassium 4.3 3.4 - 5.3 mmol/L    Chloride 110 (H) 94 - 109 mmol/L    Carbon Dioxide 26 20 - 32 mmol/L    Anion Gap 5 3 - 14 mmol/L    Glucose 82 70 - 99 mg/dL    Urea Nitrogen 11 7 - 30 mg/dL    Creatinine 0.96 0.66 - 1.25 mg/dL    GFR Estimate 89 >60 mL/min/1.7m2    GFR Estimate If Black >90 >60 mL/min/1.7m2    Calcium 8.5 8.5 - 10.1 mg/dL    Bilirubin Total 0.7 0.2 - 1.3 mg/dL    Albumin 3.7 3.4 - 5.0 g/dL    Protein Total 7.1 6.8 - 8.8 g/dL    Alkaline Phosphatase 52 40 - 150 U/L    ALT 28 0 - 70 U/L    AST 17 0 - 45 U/L   TSH with free T4 reflex   Result Value Ref Range    TSH 1.71 0.40 - 4.00 mU/L       ASSESSMENT/PLAN:   Loco was seen today for physical.    Diagnoses and all orders for this visit:    Encounter for routine adult health examination without abnormal findings  -     Lipid panel reflex to direct LDL Fasting  -     Prostate spec antigen screen  -     Comprehensive metabolic panel  -     TSH with free T4  "reflex  -     JUST IN CASE    CARDIOVASCULAR SCREENING; LDL GOAL LESS THAN 160  -     Lipid panel reflex to direct LDL Fasting    Screening for diabetes mellitus  -     Comprehensive metabolic panel    Screening for prostate cancer  -     Prostate spec antigen screen    Screening for thyroid disorder  -     TSH with free T4 reflex    Family history of malignant neoplasm of prostate  -     Prostate spec antigen screen    PLAN:    Patient needs to follow up in if no improvement,or sooner if worsening of symptoms or other symptoms develop.  Will follow up and/or notify patient of  results via My Chart to determine further need for followup  Follow up office visit in one year for annual health maintenance exam, sooner PRN.    COUNSELING:  Reviewed preventive health counseling, as reflected in patient instructions  Special attention given to:        Regular exercise       Healthy diet/nutrition       Vision screening    BP Readings from Last 1 Encounters:   12/07/18 100/70     Estimated body mass index is 30.45 kg/(m^2) as calculated from the following:    Height as of 9/1/17: 6' 1\" (1.854 m).    Weight as of 2/14/18: 230 lb 12.8 oz (104.7 kg).      Weight management plan: Discussed healthy diet and exercise guidelines     reports that he has quit smoking. His smoking use included Cigarettes. He has a 2.50 pack-year smoking history. He quit smokeless tobacco use about 16 years ago. His smokeless tobacco use included Chew.      Counseling Resources:  ATP IV Guidelines  Pooled Cohorts Equation Calculator  FRAX Risk Assessment  ICSI Preventive Guidelines  Dietary Guidelines for Americans, 2010  USDA's MyPlate  ASA Prophylaxis  Lung CA Screening    Vy Argueta, ESSIE CNP  [facility]  "

## 2018-12-07 NOTE — MR AVS SNAPSHOT
After Visit Summary   12/7/2018    Loco Landeros    MRN: [id number]           Patient Information     Date Of Birth          1985        Visit Information        Provider Department      12/7/2018 7:50 AM Vy Argueta APRN CNP [facility]        Today's Diagnoses     Encounter for routine adult health examination without abnormal findings    -  1    CARDIOVASCULAR SCREENING; LDL GOAL LESS THAN 160        Screening for diabetes mellitus        Screening for prostate cancer        Screening for thyroid disorder        Family history of malignant neoplasm of prostate          Care Instructions    PLAN:   1.   Orders Placed This Encounter   Procedures     Lipid panel reflex to direct LDL Fasting     Prostate spec antigen screen     Comprehensive metabolic panel     TSH with free T4 reflex     JUST IN CASE       2. Patient needs to follow up in if no improvement,or sooner if worsening of symptoms or other symptoms develop.  Will follow up and/or notify patient of  results via My Chart to determine further need for followup  Follow up office visit in one year for annual health maintenance exam, sooner PRN.    Preventive Health Recommendations  Male Ages 26 - 39    Yearly exam:             See your health care provider every year in order to  o   Review health changes.   o   Discuss preventive care.    o   Review your medicines if your doctor has prescribed any.    You should be tested each year for STDs (sexually transmitted diseases), if you re at risk.     After age 35, talk to your provider about cholesterol testing. If you are at risk for heart disease, have your cholesterol tested at least every 5 years.     If you are at risk for diabetes, you should have a diabetes test (fasting glucose).  Shots: Get a flu shot each year. Get a tetanus shot every 10 years.     Nutrition:    Eat at least 5 servings of fruits and vegetables daily.     Eat whole-grain bread,  whole-wheat pasta and brown rice instead of white grains and rice.     Get adequate Calcium and Vitamin D.     Lifestyle    Exercise for at least 150 minutes a week (30 minutes a day, 5 days a week). This will help you control your weight and prevent disease.     Limit alcohol to one drink per day.     No smoking.     Wear sunscreen to prevent skin cancer.     See your dentist every six months for an exam and cleaning.   It was a pleasure seeing you today at the [facility]. Thank you for allowing us to care for you today. We truly hope we provided you with the excellent service you deserve. Please let us know if there is anything else we can do for you so we can be sure you are leaving completley satisfied with your care experience.       General information about your clinic   Clinic Hours Lab Hours (Appointments are required)   Mon-Thurs: 7:30 AM - 7 PM Mon-Thurs: 7:30 AM - 7 PM   Fri: 7:30 AM - 5 PM Fri: 7:30 AM - 5 PM        After Hours Nurse Advise & Appts:  [facility] Nurse Advisors: [phone]  [facility] On Call: to make appointments anytime: [phone] On Call Physician: call [phone] and answering service will page the on call physician.        For urgent appointments, please call [phone] and ask for the triage nurse or your care team clinic nurse.  How to contact my care team:  MyChart: [email or web address]   Phone: [phone]   Fax: [phone]       [facility]:   Phone: [phone]  Hours: 8:00 AM - 6:00 PM  Medication Refills:  Call your pharmacy and they will forward the refill to us. Please allow 3 business days for your refills to be completed.       Normal or non-critical lab and imaging results will be communicated to you by MyChart, letter or phone within 7 days.  If you do not hear from us within 10 days, please call the clinic. If you have a critical or abnormal lab result, we will notify you by phone as soon as possible.       We now have  PWIC (Pediatric Walk in Care)  Monday-Friday from 7:30-4. Simply walk in and be seen for your urgent needs like cough, fever, rash, diarrhea or vomiting, pink eye, UTI. No appointments needed. Ask one of the team for more information      -Your Care Team:    Dr. aMynor Piña - Internal Medicine/Pediatrics   Dr. Rebekah Love - Family Medicine  Dr. Mckenzie Judd - Pediatrics  Dr. Debra Martinez - Pediatrics  Vy Argueta CNP - Family Practice Nurse Practitioner                         Follow-ups after your visit        Your next 10 appointments already scheduled     Jan 11, 2019  4:30 PM CST   [facility] Visit with Ricarda [facility]ba MD   [facility]    [address] [place] [address] [phone]              Who to contact     If you have questions or need follow up information about today's clinic visit or your schedule please contact [facility] directly at [phone].  Normal or non-critical lab and imaging results will be communicated to you by [employer], letter or phone within 4 business days after the clinic has received the results. If you do not hear from us within 7 days, please contact the clinic through [employer]zVuzt or phone. If you have a critical or abnormal lab result, we will notify you by phone as soon as possible.  Submit refill requests through Placed or call your pharmacy and they will forward the refill request to us. Please allow 3 business days for your refill to be completed.          Additional Information About Your Visit        [facility] Information     Placed gives you secure access to your electronic health record. If you see a primary care provider, you can also send messages to your care team and make appointments. If you have questions, please call your primary care clinic.  If you do not have a primary care provider, please call [phone] and they will assist you.      Placed is an electronic gateway that provides  "easy, online access to your medical records. With Sqord, you can request a clinic appointment, read your test results, renew a prescription or communicate with your care team.     To access your existing account, please contact your [facility] or call [phone] for assistance.        Care EveryWhere ID     This is your Care EveryWhere ID. This could be used by other organizations to access your [place] medical records  [id number]        Your Vitals Were     Pulse Temperature Height Pulse Oximetry BMI (Body Mass Index)       63 96.2  F (35.7  C) (Temporal) 6' 0.5\" (1.842 m) 96% 31.49 kg/m2        Blood Pressure from Last 3 Encounters:   12/07/18 100/70   02/14/18 124/84   10/02/17 (!) 127/92    Weight from Last 3 Encounters:   12/07/18 235 lb 6.4 oz (106.8 kg)   02/14/18 230 lb 12.8 oz (104.7 kg)   09/01/17 225 lb 6.4 oz (102.2 kg)              We Performed the Following     Comprehensive metabolic panel     JUST IN CASE     Lipid panel reflex to direct LDL Fasting     Prostate spec antigen screen     TSH with free T4 reflex        Primary Care Provider Office Phone # Fax #    [facility] [phone]       [address]  [facility] [address]        Equal Access to Services     STEFANIA TAYLOR : Hadii aad ku hadasho Soomaali, waaxda luqadaha, qaybta kaalmada adeegyada, laurence quach. So [facility] [phone].    ATENCIÓN: Si habla español, tiene a torrez disposición servicios gratuitos de asistencia lingüística. Llame al [phone].    We comply with applicable federal civil rights laws and [place] laws. We do not discriminate on the basis of race, color, national origin, age, disability, sex, sexual orientation, or gender identity.            Thank you!     Thank you for choosing [facility]  for your care. Our goal is always to provide you with excellent care. Hearing back from our patients is one " way we can continue to improve our services. Please take a few minutes to complete the written survey that you may receive in the mail after your visit with us. Thank you!             Your Updated Medication List - Protect others around you: Learn how to safely use, store and throw away your medicines at www.disposemymeds.org.          This list is accurate as of 12/7/18  8:15 AM.  Always use your most recent med list.                   Brand Name Dispense Instructions for use Diagnosis    clindamycin 1 % external gel    CLINDAMAX    120 g    Apply topically 2 times daily Apply once daily to the face and back    Acne vulgaris       doxycycline ROSACEA 40 MG DR capsule    ORACEA    112 capsule    Take 1 capsule (40 mg) by mouth daily    Acne vulgaris

## 2018-12-07 NOTE — PROGRESS NOTES
Wesly Landeros,    Attached are your test results.  -PSA (prostate specific antigen) test is normal.  -Cholesterol levels (LDL,HDL, Triglycerides) are normal.  ADVISE: rechecking in 1 year.   -Liver and gallbladder tests are normal (ALT,AST, Alk phos, bilirubin), kidney function is normal (Cr, GFR), sodium is normal, potassium is normal, calcium is normal, glucose is normal.  -TSH (thyroid stimulating hormone) level is normal which indicates normal thyroid function.   Please contact us if you have any questions.    Vy Argueta, CNP

## 2018-12-07 NOTE — PATIENT INSTRUCTIONS
PLAN:   1.   Orders Placed This Encounter   Procedures     Lipid panel reflex to direct LDL Fasting     Prostate spec antigen screen     Comprehensive metabolic panel     TSH with free T4 reflex     JUST IN CASE       2. Patient needs to follow up in if no improvement,or sooner if worsening of symptoms or other symptoms develop.  Will follow up and/or notify patient of  results via My Chart to determine further need for followup  Follow up office visit in one year for annual health maintenance exam, sooner PRN.    Preventive Health Recommendations  Male Ages 26 - 39    Yearly exam:             See your health care provider every year in order to  o   Review health changes.   o   Discuss preventive care.    o   Review your medicines if your doctor has prescribed any.    You should be tested each year for STDs (sexually transmitted diseases), if you re at risk.     After age 35, talk to your provider about cholesterol testing. If you are at risk for heart disease, have your cholesterol tested at least every 5 years.     If you are at risk for diabetes, you should have a diabetes test (fasting glucose).  Shots: Get a flu shot each year. Get a tetanus shot every 10 years.     Nutrition:    Eat at least 5 servings of fruits and vegetables daily.     Eat whole-grain bread, whole-wheat pasta and brown rice instead of white grains and rice.     Get adequate Calcium and Vitamin D.     Lifestyle    Exercise for at least 150 minutes a week (30 minutes a day, 5 days a week). This will help you control your weight and prevent disease.     Limit alcohol to one drink per day.     No smoking.     Wear sunscreen to prevent skin cancer.     See your dentist every six months for an exam and cleaning.   It was a pleasure seeing you today at the [facility]. Thank you for allowing us to care for you today. We truly hope we provided you with the excellent service you deserve. Please let us know if there is  anything else we can do for you so we can be sure you are leaving completley satisfied with your care experience.       General information about your clinic   Clinic Hours Lab Hours (Appointments are required)   Mon-Thurs: 7:30 AM - 7 PM Mon-Thurs: 7:30 AM - 7 PM   Fri: 7:30 AM - 5 PM Fri: 7:30 AM - 5 PM        After Hours Nurse Advise & Appts:  [facility] Nurse Advisors: [phone]  [facility] On Call: to make appointments anytime: [phone] On Call Physician: call [phone] and answering service will page the on call physician.        For urgent appointments, please call [phone] and ask for the triage nurse or your care team clinic nurse.  How to contact my care team:  MyChart: [email or web address]   Phone: [phone]   Fax: [phone]       [facility]:   Phone: [phone]  Hours: 8:00 AM - 6:00 PM  Medication Refills:  Call your pharmacy and they will forward the refill to us. Please allow 3 business days for your refills to be completed.       Normal or non-critical lab and imaging results will be communicated to you by MyChart, letter or phone within 7 days.  If you do not hear from us within 10 days, please call the clinic. If you have a critical or abnormal lab result, we will notify you by phone as soon as possible.       We now have PWIC (Pediatric Walk in Care)  Monday-Friday from 7:30-4. Simply walk in and be seen for your urgent needs like cough, fever, rash, diarrhea or vomiting, pink eye, UTI. No appointments needed. Ask one of the team for more information      -Your Care Team:    Dr. Maynor Piña - Internal Medicine/Pediatrics   Dr. Rebekah Love - Family Medicine  Dr. Mckenzie Judd - Pediatrics  Dr. Debra Martinez - Pediatrics  Vy Argueta CNP - Family Practice Nurse Practitioner

## 2019-01-11 ENCOUNTER — OFFICE VISIT (OUTPATIENT)
Dept: DERMATOLOGY | Facility: CLINIC | Age: 34
End: 2019-01-11
Payer: COMMERCIAL

## 2019-01-11 VITALS — BODY MASS INDEX: 31.71 KG/M2 | WEIGHT: 237.1 LBS

## 2019-01-11 DIAGNOSIS — L70.0 ACNE VULGARIS: Primary | ICD-10-CM

## 2019-01-11 DIAGNOSIS — Z76.89 ENCOUNTER PRIOR TO INITIATION OF MEDICATION: ICD-10-CM

## 2019-01-11 DIAGNOSIS — Z51.81 MEDICATION MONITORING ENCOUNTER: ICD-10-CM

## 2019-01-11 DIAGNOSIS — L73.1 PSEUDOFOLLICULITIS BARBAE: ICD-10-CM

## 2019-01-11 LAB
DIFFERENTIAL METHOD BLD: NORMAL
ERYTHROCYTE [DISTWIDTH] IN BLOOD BY AUTOMATED COUNT: 13.1 % (ref 10–15)
HCT VFR BLD AUTO: 45.5 % (ref 40–53)
HGB BLD-MCNC: 14.8 G/DL (ref 13.3–17.7)
LYMPHOCYTES # BLD AUTO: 3.7 10E9/L (ref 0.8–5.3)
LYMPHOCYTES NFR BLD AUTO: 35 %
MCH RBC QN AUTO: 27.5 PG (ref 26.5–33)
MCHC RBC AUTO-ENTMCNC: 32.5 G/DL (ref 31.5–36.5)
MCV RBC AUTO: 84 FL (ref 78–100)
MONOCYTES # BLD AUTO: 1 10E9/L (ref 0–1.3)
MONOCYTES NFR BLD AUTO: 9 %
NEUTROPHILS # BLD AUTO: 6 10E9/L (ref 1.6–8.3)
NEUTROPHILS NFR BLD AUTO: 56 %
PLATELET # BLD AUTO: 299 10E9/L (ref 150–450)
PLATELET # BLD EST: NORMAL 10*3/UL
RBC # BLD AUTO: 5.39 10E12/L (ref 4.4–5.9)
RBC MORPH BLD: NORMAL
SMUDGE CELLS BLD QL SMEAR: PRESENT
TOXIC GRANULES BLD QL SMEAR: PRESENT
WBC # BLD AUTO: 10.7 10E9/L (ref 4–11)

## 2019-01-11 PROCEDURE — 36415 COLL VENOUS BLD VENIPUNCTURE: CPT | Performed by: DERMATOLOGY

## 2019-01-11 PROCEDURE — 99213 OFFICE O/P EST LOW 20 MIN: CPT | Performed by: DERMATOLOGY

## 2019-01-11 PROCEDURE — 85025 COMPLETE CBC W/AUTO DIFF WBC: CPT | Performed by: DERMATOLOGY

## 2019-01-11 RX ORDER — HYDROCORTISONE 2.5 %
CREAM (GRAM) TOPICAL
Qty: 30 G | Refills: 1 | Status: SHIPPED | OUTPATIENT
Start: 2019-01-11 | End: 2021-04-01

## 2019-01-11 RX ORDER — BIOTIN 10 MG
2 TABLET ORAL DAILY
COMMUNITY
End: 2024-06-18

## 2019-01-11 ASSESSMENT — PAIN SCALES - GENERAL: PAINLEVEL: NO PAIN (0)

## 2019-01-11 NOTE — NURSING NOTE
Accutane Intake:  Ipledge number:[id number]  Copy of consent retained for medical record:Yes  Baseline and future lab orders placed:Yes  Baseline weight obtained:Yes  Patient registered:Yes   Sagrario Salas RN

## 2019-01-11 NOTE — NURSING NOTE
Loco Landerso's goals for this visit include:   Chief Complaint   Patient presents with     Acne     Possible accutane start     He requests these members of his care team be copied on today's visit information: n/a    [facility]    Referring Provider:  No referring provider defined for this encounter.    Sagrario Salas RN

## 2019-01-11 NOTE — LETTER
"    1/11/2019         RE: Loco Landeros  [address] [facility] [address]        Dear Colleague,    Thank you for referring your patient, Loco Landeros, to the [facility]. Please see a copy of my visit note below.    [facility] Dermatology Note      Dermatology Problem List:  1. Acne vulgaris  -Previous Tx: doxy   -Current Tx: clindamycin, BPO wash, tretinoin.     CC:   Chief Complaint   Patient presents with     Acne     Possible accutane start       Encounter Date: Jan 11, 2019    History of Present Illness:  Mr. Loco Landeros is a 33 year old male who presents for evaluation of folliculitis and acne. The patient was last seen 7/5/18. Currently, the patient's acne is \"so-so.\" He had a course of doxycycline previously which cleared up his back and has had no flares since then. Only using clindamycin right now, as tretinoin was too irritating. However, he still gets break outs along the jaw line. His pimples typically last 10 days. Has not done Accutane previously. Has a cousin w/Crohn's disease. The pt thinks that most of his acne on the jaw line is a result from ingrown hairs. No hsitory of depression.       Past Medical History:   Patient Active Problem List   Diagnosis     Degeneration of lumbar or lumbosacral intervertebral disc     Family history of malignant neoplasm of prostate     Low back pain     Nicotine use disorder     Past Medical History:   Diagnosis Date     NO ACTIVE PROBLEMS      Past Surgical History:   Procedure Laterality Date     COLONOSCOPY WITH CO2 INSUFFLATION N/A 10/2/2017    Procedure: COLONOSCOPY WITH CO2 INSUFFLATION;  Colonoscopy, Vy Argueta, Rectal bleeding, BMI 29.74, [facility] [place] [phone]. ;  Surgeon: Justa Pickard MD;  Location:  OR     NO HISTORY OF SURGERY         Social History:  The patient works as a  for [employer].   He does drink 3-6 alcoholic beverages a week. He does " not smoke or chew.     Family History:  No family hx of skin cancer.     Medications:  Current Outpatient Medications   Medication Sig Dispense Refill     clindamycin (CLINDAMAX) 1 % topical gel Apply topically 2 times daily Apply once daily to the face and back 120 g 11     Multiple Vitamins-Minerals (MULTIVITAMIN ADULT) CHEW Take 2 chew tab by mouth daily       No Known Allergies      Review of Systems:  -Constitutional: The patient denies fatigue, fevers, chills, unintended weight loss, and night sweats.  -Skin: As above in HPI. No additional skin concerns.    Physical exam:  Vitals: There were no vitals taken for this visit.  GEN: This is a well developed, well-nourished male in no acute distress, in a pleasant mood.    SKIN: Acne exam, which includes the face, neck, upper central chest, and upper central back was performed.  - Perifollicularly centered pustules around the distribution of the  - Acneiform papules and pustules on the forehead  - Erythematous papules with what feels like a nodular component subcutaneously  - Acneiform papules and pustules on the back  -No other lesions of concern on areas examined.     Impression/Plan:  1. Acne vulgaris- possibly a component of pseudofolliculitis barbae    Reduce frequency of shaving to Monday, Wednesday, and Friday    Start hydrocortisone 2.5% cream, apply after shaving 3 times weekly for 1 month only    Discussion of the risks and side effects of Accutane including but not limited to mucocutaneous dryness, arthralgias, myalgias, depression, suicidal ideation, headache, blurred vision,  increase in liver function tests, increase in lipids, and teratogenic effects. Discussed need for two forms of contraception. The [employer] brochure was provided and the contents discussed with the patient. The patient was counseled they cannot give blood while on Accutane. No personal or family history of inflammatory bowel disease or hypertriglyceridemia known to patient.  Reviewed need to avoid alcohol on medication.   [facility].   At the next visit , we will begin Accutane 40 mg daily.  One month supply with no refills provided.  Goal dose is [address]12 to [address]374  mg for 120-150 mg/kg dosing in this 107 kg patient.  We may go lower on this as this is based on current weight. Reviewed need to use condoms  CBC to be obtained today - CMP and lipid panel reviewed and were normal.     Total cumulative exat7wk.   Patient's I-pledge # is [id number] .     The patient will stop all acne medications including doxycycline.     Follow-up in 30 days.     Staff Involved:  Staff/Scribe     Scribe Disclosure  I, Juan Miller, am serving as a scribe to document services personally performed by Dr. Ricarda Barba MD, based on data collection and the provider's statements to me.     Provider Disclosure:   The documentation recorded by the scribe accurately reflects the services I personally performed and the decisions made by me.    Ricarda Barba MD    Department of Dermatology  [facility]: Phone: [phone], Fax:[phone]  [facility]: Phone: [phone], Fax: [phone]          Again, thank you for allowing me to participate in the care of your patient.        Sincerely,        Ricarda Barba MD

## 2019-01-11 NOTE — PROGRESS NOTES
"[facility] Dermatology Note      Dermatology Problem List:  1. Acne vulgaris  -Previous Tx: doxy   -Current Tx: clindamycin, BPO wash, tretinoin.     CC:   Chief Complaint   Patient presents with     Acne     Possible accutane start       Encounter Date: Jan 11, 2019    History of Present Illness:  Mr. Loco Landeros is a 33 year old male who presents for evaluation of folliculitis and acne. The patient was last seen 7/5/18. Currently, the patient's acne is \"so-so.\" He had a course of doxycycline previously which cleared up his back and has had no flares since then. Only using clindamycin right now, as tretinoin was too irritating. However, he still gets break outs along the jaw line. His pimples typically last 10 days. Has not done Accutane previously. Has a cousin w/Crohn's disease. The pt thinks that most of his acne on the jaw line is a result from ingrown hairs. No hsitory of depression.       Past Medical History:   Patient Active Problem List   Diagnosis     Degeneration of lumbar or lumbosacral intervertebral disc     Family history of malignant neoplasm of prostate     Low back pain     Nicotine use disorder     Past Medical History:   Diagnosis Date     NO ACTIVE PROBLEMS      Past Surgical History:   Procedure Laterality Date     COLONOSCOPY WITH CO2 INSUFFLATION N/A 10/2/2017    Procedure: COLONOSCOPY WITH CO2 INSUFFLATION;  Colonoscopy, Vy Argueta, Rectal bleeding, BMI 29.74, [facility] [place] [phone]. ;  Surgeon: Justa Pickard MD;  Location:  OR     NO HISTORY OF SURGERY         Social History:  The patient works as a  for [employer].   He does drink 3-6 alcoholic beverages a week. He does not smoke or chew.     Family History:  No family hx of skin cancer.     Medications:  Current Outpatient Medications   Medication Sig Dispense Refill     clindamycin (CLINDAMAX) 1 % topical gel Apply topically 2 times daily Apply once daily to the face " and back 120 g 11     Multiple Vitamins-Minerals (MULTIVITAMIN ADULT) CHEW Take 2 chew tab by mouth daily       No Known Allergies      Review of Systems:  -Constitutional: The patient denies fatigue, fevers, chills, unintended weight loss, and night sweats.  -Skin: As above in HPI. No additional skin concerns.    Physical exam:  Vitals: There were no vitals taken for this visit.  GEN: This is a well developed, well-nourished male in no acute distress, in a pleasant mood.    SKIN: Acne exam, which includes the face, neck, upper central chest, and upper central back was performed.  - Perifollicularly centered pustules around the distribution of the  - Acneiform papules and pustules on the forehead  - Erythematous papules with what feels like a nodular component subcutaneously  - Acneiform papules and pustules on the back  -No other lesions of concern on areas examined.     Impression/Plan:  1. Acne vulgaris- possibly a component of pseudofolliculitis barbae    Reduce frequency of shaving to Monday, Wednesday, and Friday    Start hydrocortisone 2.5% cream, apply after shaving 3 times weekly for 1 month only    Discussion of the risks and side effects of Accutane including but not limited to mucocutaneous dryness, arthralgias, myalgias, depression, suicidal ideation, headache, blurred vision,  increase in liver function tests, increase in lipids, and teratogenic effects. Discussed need for two forms of contraception. The ipledgeprogram brochure was provided and the contents discussed with the patient. The patient was counseled they cannot give blood while on Accutane. No personal or family history of inflammatory bowel disease or hypertriglyceridemia known to patient. Reviewed need to avoid alcohol on medication.   Ipledge program consent was obtained.   At the next visit , we will begin Accutane 40 mg daily.  One month supply with no refills provided.  Goal dose is 52425 to 41647  mg for 120-150 mg/kg dosing in this  107 kg patient.  We may go lower on this as this is based on current weight. Reviewed need to use condoms  CBC to be obtained today - CMP and lipid panel reviewed and were normal.     Total cumulative [id number].   Patient's I-pledge # is [id number] .     The patient will stop all acne medications including doxycycline.     Follow-up in 30 days.     Staff Involved:  Staff/Scribe     Scribe Disclosure  I, Juan Miller, am serving as a scribe to document services personally performed by Dr. Ricarda Barba MD, based on data collection and the provider's statements to me.     Provider Disclosure:   The documentation recorded by the scribe accurately reflects the services I personally performed and the decisions made by me.    Ricarda Barba MD    Department of Dermatology  [facility]: Phone: [phone], Fax:[phone]  [facility]: Phone: [phone], Fax: [phone]

## 2019-01-13 DIAGNOSIS — L70.0 ACNE VULGARIS: Primary | ICD-10-CM

## 2019-01-13 RX ORDER — ISOTRETINOIN 40 MG/1
40 CAPSULE ORAL DAILY
Qty: 30 CAPSULE | Refills: 0 | Status: SHIPPED | OUTPATIENT
Start: 2019-01-13 | End: 2019-02-18

## 2019-01-14 ENCOUNTER — TELEPHONE (OUTPATIENT)
Dept: DERMATOLOGY | Facility: CLINIC | Age: 34
End: 2019-01-14

## 2019-01-14 NOTE — TELEPHONE ENCOUNTER
Notes recorded by Naa Ness RN on 1/14/2019 at 10:30 AM CST  Pt returned the call and notified of messages below Pt verbalized understanding and had no further questions...Naa Ness RN    ------    Notes recorded by Jonh Sanchez MA on 1/14/2019 at 9:24 AM CST  This cma confirmed med was sent to [facility] in , off [facility], also confirmed in ipledge he should  medication within 6 days. This CMA left message for pt to call clinic back @ [phone] to relay above message.     Jonh Sanchez CMA     ------    Notes recorded by Ricarda Barba MD on 1/13/2019 at 9:13 AM CST  Negative cbc, will start accutane

## 2019-02-15 ENCOUNTER — OFFICE VISIT (OUTPATIENT)
Dept: OPTOMETRY | Facility: CLINIC | Age: 34
End: 2019-02-15
Payer: COMMERCIAL

## 2019-02-15 ENCOUNTER — OFFICE VISIT (OUTPATIENT)
Dept: DERMATOLOGY | Facility: CLINIC | Age: 34
End: 2019-02-15
Payer: COMMERCIAL

## 2019-02-15 DIAGNOSIS — G44.219 EPISODIC TENSION-TYPE HEADACHE, NOT INTRACTABLE: Primary | ICD-10-CM

## 2019-02-15 DIAGNOSIS — D48.5 NEOPLASM OF UNCERTAIN BEHAVIOR OF SKIN: ICD-10-CM

## 2019-02-15 DIAGNOSIS — Z79.899 HIGH RISK MEDICATION USE: ICD-10-CM

## 2019-02-15 DIAGNOSIS — H02.889 MEIBOMIAN GLAND DYSFUNCTION: ICD-10-CM

## 2019-02-15 DIAGNOSIS — L70.0 ACNE VULGARIS: Primary | ICD-10-CM

## 2019-02-15 DIAGNOSIS — L70.0 ACNE VULGARIS: ICD-10-CM

## 2019-02-15 DIAGNOSIS — Z51.81 MEDICATION MONITORING ENCOUNTER: ICD-10-CM

## 2019-02-15 DIAGNOSIS — D22.9 MULTIPLE BENIGN NEVI: ICD-10-CM

## 2019-02-15 DIAGNOSIS — H52.13 MYOPIA OF BOTH EYES: ICD-10-CM

## 2019-02-15 LAB
ALT SERPL W P-5'-P-CCNC: 31 U/L (ref 0–70)
AST SERPL W P-5'-P-CCNC: 18 U/L (ref 0–45)
BASOPHILS # BLD AUTO: 0 10E9/L (ref 0–0.2)
BASOPHILS NFR BLD AUTO: 0.5 %
CHOLEST SERPL-MCNC: 202 MG/DL
DIFFERENTIAL METHOD BLD: NORMAL
EOSINOPHIL # BLD AUTO: 0.1 10E9/L (ref 0–0.7)
EOSINOPHIL NFR BLD AUTO: 1.9 %
ERYTHROCYTE [DISTWIDTH] IN BLOOD BY AUTOMATED COUNT: 12.8 % (ref 10–15)
HCT VFR BLD AUTO: 47 % (ref 40–53)
HDLC SERPL-MCNC: 55 MG/DL
HGB BLD-MCNC: 15.2 G/DL (ref 13.3–17.7)
IMM GRANULOCYTES # BLD: 0 10E9/L (ref 0–0.4)
IMM GRANULOCYTES NFR BLD: 0.2 %
LDLC SERPL CALC-MCNC: 125 MG/DL
LYMPHOCYTES # BLD AUTO: 1.6 10E9/L (ref 0.8–5.3)
LYMPHOCYTES NFR BLD AUTO: 28.4 %
MCH RBC QN AUTO: 27.5 PG (ref 26.5–33)
MCHC RBC AUTO-ENTMCNC: 32.3 G/DL (ref 31.5–36.5)
MCV RBC AUTO: 85 FL (ref 78–100)
MONOCYTES # BLD AUTO: 0.5 10E9/L (ref 0–1.3)
MONOCYTES NFR BLD AUTO: 8.9 %
NEUTROPHILS # BLD AUTO: 3.4 10E9/L (ref 1.6–8.3)
NEUTROPHILS NFR BLD AUTO: 60.1 %
NONHDLC SERPL-MCNC: 147 MG/DL
PLATELET # BLD AUTO: 258 10E9/L (ref 150–450)
RBC # BLD AUTO: 5.53 10E12/L (ref 4.4–5.9)
TRIGL SERPL-MCNC: 109 MG/DL
WBC # BLD AUTO: 5.7 10E9/L (ref 4–11)

## 2019-02-15 PROCEDURE — 80061 LIPID PANEL: CPT | Performed by: DERMATOLOGY

## 2019-02-15 PROCEDURE — 92015 DETERMINE REFRACTIVE STATE: CPT | Performed by: OPTOMETRIST

## 2019-02-15 PROCEDURE — 84460 ALANINE AMINO (ALT) (SGPT): CPT | Performed by: DERMATOLOGY

## 2019-02-15 PROCEDURE — 84450 TRANSFERASE (AST) (SGOT): CPT | Performed by: DERMATOLOGY

## 2019-02-15 PROCEDURE — 92004 COMPRE OPH EXAM NEW PT 1/>: CPT | Performed by: OPTOMETRIST

## 2019-02-15 PROCEDURE — 36415 COLL VENOUS BLD VENIPUNCTURE: CPT | Performed by: DERMATOLOGY

## 2019-02-15 PROCEDURE — 99213 OFFICE O/P EST LOW 20 MIN: CPT | Performed by: DERMATOLOGY

## 2019-02-15 PROCEDURE — 85025 COMPLETE CBC W/AUTO DIFF WBC: CPT | Performed by: DERMATOLOGY

## 2019-02-15 ASSESSMENT — VISUAL ACUITY
OS_SC: 20/20
METHOD: SNELLEN - LINEAR
OS_SC: 20/20
OD_SC: 20/20
OD_SC: 20/20

## 2019-02-15 ASSESSMENT — CUP TO DISC RATIO
OS_RATIO: 0.4
OD_RATIO: 0.4

## 2019-02-15 ASSESSMENT — EXTERNAL EXAM - LEFT EYE: OS_EXAM: NORMAL

## 2019-02-15 ASSESSMENT — REFRACTION_MANIFEST
OS_SPHERE: -0.25
OD_SPHERE: -0.25

## 2019-02-15 ASSESSMENT — TONOMETRY
OD_IOP_MMHG: 15
OS_IOP_MMHG: 19
IOP_METHOD: TONOPEN

## 2019-02-15 ASSESSMENT — EXTERNAL EXAM - RIGHT EYE: OD_EXAM: NORMAL

## 2019-02-15 ASSESSMENT — SLIT LAMP EXAM - LIDS
COMMENTS: MEIBOMIAN GLAND DYSFUNCTION
COMMENTS: MEIBOMIAN GLAND DYSFUNCTION

## 2019-02-15 ASSESSMENT — CONF VISUAL FIELD
OD_NORMAL: 1
OS_NORMAL: 1

## 2019-02-15 NOTE — NURSING NOTE
Loco Landeros's goals for this visit include:   Chief Complaint   Patient presents with     Accutane     pt has had mild headaches        He requests these members of his care team be copied on today's visit information: yes    PCP: [facility]    Referring Provider:  No referring provider defined for this encounter.    There were no vitals taken for this visit.    Do you need any medication refills at today's visit? No     Amorrjosee Sanchez CMA

## 2019-02-15 NOTE — LETTER
2/15/2019         RE: Loco Landeros  [address] [facility] [address]        Dear Colleague,    Thank you for referring your patient, Loco Landeros, to the [facility]. Please see a copy of my visit note below.    [facility] Dermatology Note      Dermatology Problem List:  1. Acne vulgaris  -Previous Tx: doxy   -Current Tx: clindamycin, BPO wash, tretinoin.     CC:   Chief Complaint   Patient presents with     Accutane     pt has had mild headaches        Encounter Date: Feb 15, 2019    History of Present Illness:  Mr. Loco Landeros is a 33 year old male who presents for evaluation of folliculitis and acne. The patient was last seen 1/11/19 at which time he started Accutane. Today, the patient reports that he has been experiencing mild headaches since starting the new medication. Face seems to be the same, if not slightly improved. The patient reports tolerable mucocutaneous dryness, and denies, myalgias, depression, suicidal ideation, diarrhea, or blurred vision. Headaches are new since starting the medication  - very mild, he says. Also admits to mild joint pain in the left knee.     Past Medical History:   Patient Active Problem List   Diagnosis     Degeneration of lumbar or lumbosacral intervertebral disc     Family history of malignant neoplasm of prostate     Low back pain     Nicotine use disorder     Past Medical History:   Diagnosis Date     NO ACTIVE PROBLEMS      Past Surgical History:   Procedure Laterality Date     COLONOSCOPY WITH CO2 INSUFFLATION N/A 10/2/2017    Procedure: COLONOSCOPY WITH CO2 INSUFFLATION;  Colonoscopy, Vy Argueta, Rectal bleeding, BMI 29.74, [facility] [phone]. ;  Surgeon: Justa Pickard MD;  Location:  OR     NO HISTORY OF SURGERY         Social History:  The patient works as a  for [employer].   He does drink 3-6 alcoholic beverages a week. He does not smoke or chew.      Family History:  No family hx of skin cancer.     Medications:  Current Outpatient Medications   Medication Sig Dispense Refill     hydrocortisone 2.5 % cream Apply three times weekly for 1 month after shaving. Wait at least 10 minutes after shaving. 30 g 1     ISOtretinoin (ACCUTANE) 40 MG capsule Take 1 capsule (40 mg) by mouth daily 30 capsule 0     Multiple Vitamins-Minerals (MULTIVITAMIN ADULT) CHEW Take 2 chew tab by mouth daily       No Known Allergies      Review of Systems:  -Constitutional:In usual state of health. Admits to mild migraines.  -Skin: As above in HPI. No additional skin concerns.  - Rheum - admits to mild joint pain in left knee.     Physical exam:  Vitals: There were no vitals taken for this visit.  GEN: This is a well developed, well-nourished male in no acute distress, in a pleasant mood.    SKIN: Acne exam, which includes the face, neck, upper central chest, and upper central back was performed.  - Acneiform nodule on the left cheek.   - Chest and back are clear  - Acneiform papules on the forehead, right cheek.  -No other lesions of concern on areas examined.     Impression/Plan:  1. Acne vulgaris with nodule today, improved on accutane 40 - pt admits to headaches, will refer him to ophthalmology for further evaluation. Given that he admits to joint pain, we likely will keep his dose at 40 mg.       Hold Accutane 40 mg daily until cleared by ophthalmology.  One month supply with no refills provided.  Goal dose is 54[address] to 72039  mg for 120-150 mg/kg dosing in this 107 kg patient.  We may go lower on this as this is based on current weight. Reviewed need to use condoms  Total cumulative dose 960 mg.   Patient's I-pledge # is [id number] .   2. PSuedofolliculitis barbae  -Continue  hydrocortisone 2.5% cream, apply after shaving 3 times weekly for 1 month only   Follow-up in 30 days.     Staff Involved:  Staff/Scribe     Scribe Disclosure  Juan TORRES, am serving as a scribe to  document services personally performed by Dr. Ricarda Barba MD, based on data collection and the provider's statements to me.     Provider Disclosure:   The documentation recorded by the scribe accurately reflects the services I personally performed and the decisions made by me.    Ricarda Barba MD    Department of Dermatology  [facility]: Phone: [phone], Fax:[phone]  [facility]: Phone: [phone], Fax: [phone]            Again, thank you for allowing me to participate in the care of your patient.        Sincerely,        Ricarda Barba MD

## 2019-02-15 NOTE — PROGRESS NOTES
[facility] Dermatology Note      Dermatology Problem List:  1. Acne vulgaris  -Previous Tx: doxy   -Current Tx: clindamycin, BPO wash, tretinoin.     CC:   Chief Complaint   Patient presents with     Accutane     pt has had mild headaches        Encounter Date: Feb 15, 2019    History of Present Illness:  Mr. Loco Landeros is a 33 year old male who presents for evaluation of folliculitis and acne. The patient was last seen 1/11/19 at which time he started Accutane. Today, the patient reports that he has been experiencing mild headaches since starting the new medication. Face seems to be the same, if not slightly improved. The patient reports tolerable mucocutaneous dryness, and denies, myalgias, depression, suicidal ideation, diarrhea, or blurred vision. Headaches are new since starting the medication  - very mild, he says. Also admits to mild joint pain in the left knee.     Past Medical History:   Patient Active Problem List   Diagnosis     Degeneration of lumbar or lumbosacral intervertebral disc     Family history of malignant neoplasm of prostate     Low back pain     Nicotine use disorder     Past Medical History:   Diagnosis Date     NO ACTIVE PROBLEMS      Past Surgical History:   Procedure Laterality Date     COLONOSCOPY WITH CO2 INSUFFLATION N/A 10/2/2017    Procedure: COLONOSCOPY WITH CO2 INSUFFLATION;  Colonoscopy, Vy Argueta, Rectal bleeding, BMI 29.74, [facility] [phone]. ;  Surgeon: Justa Pickard MD;  Location:  OR     NO HISTORY OF SURGERY         Social History:  The patient works as a  for [email or web address].   He does drink 3-6 alcoholic beverages a week. He does not smoke or chew.     Family History:  No family hx of skin cancer.     Medications:  Current Outpatient Medications   Medication Sig Dispense Refill     hydrocortisone 2.5 % cream Apply three times weekly for 1 month after shaving. Wait at least 10 minutes after shaving. 30 g  1     ISOtretinoin (ACCUTANE) 40 MG capsule Take 1 capsule (40 mg) by mouth daily 30 capsule 0     Multiple Vitamins-Minerals (MULTIVITAMIN ADULT) CHEW Take 2 chew tab by mouth daily       No Known Allergies      Review of Systems:  -Constitutional:In usual state of health. Admits to mild migraines.  -Skin: As above in HPI. No additional skin concerns.  - Rheum - admits to mild joint pain in left knee.     Physical exam:  Vitals: There were no vitals taken for this visit.  GEN: This is a well developed, well-nourished male in no acute distress, in a pleasant mood.    SKIN: Acne exam, which includes the face, neck, upper central chest, and upper central back was performed.  - Acneiform nodule on the left cheek.   - Chest and back are clear  - Acneiform papules on the forehead, right cheek.  -No other lesions of concern on areas examined.     Impression/Plan:  1. Acne vulgaris with nodule today, improved on accutane 40 - pt admits to headaches, will refer him to ophthalmology for further evaluation. Given that he admits to joint pain, we likely will keep his dose at 40 mg.       Hold Accutane 40 mg daily until cleared by ophthalmology.  One month supply with no refills provided.  Goal dose is 75261 to 91524  mg for 120-150 mg/kg dosing in this 107 kg patient.  We may go lower on this as this is based on current weight. Reviewed need to use condoms  Total cumulative dose 960 mg.   Patient's I-pledge # is [id number] .   2. PSuedofolliculitis barbae  -Continue  hydrocortisone 2.5% cream, apply after shaving 3 times weekly for 1 month only   Follow-up in 30 days.     Staff Involved:  Staff/Scribe     Scribe Disclosure  I, Juan Miller, am serving as a scribe to document services personally performed by Dr. Ricarda Barba MD, based on data collection and the provider's statements to me.     Provider Disclosure:   The documentation recorded by the scribe accurately reflects the services I personally performed and the decisions  made by me.    Ricarda Barba MD    Department of Dermatology  [facility]: Phone: [phone], Fax:[phone]  [facility]: Phone: [phone], Fax: [phone]

## 2019-02-15 NOTE — Clinical Note
Loco Brower Dr.'s eye exam was normal except for dry eyes.  I discussed with him being proactive with OTC artificial tears.  He should return yearly for eye exams or sooner if any new changes. Thanks!  Prince Fair, SHREYA

## 2019-02-15 NOTE — PROGRESS NOTES
Chief Complaint   Patient presents with     COMPREHENSIVE EYE EXAM     Referred by Dr. Barba, dermatologist         Last Eye Exam: 1st eye exam  Dilated Previously: No, side effects of dilation explained today,info given to patient     What are you currently using to see?  does not use glasses or contacts       Distance Vision Acuity: Satisfied with vision    Near Vision Acuity: Satisfied with vision while reading  unaided    Eye Comfort: dry,pt getting headaches just started accutane 1 month ago  Do you use eye drops? 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          Medical, surgical and family histories reviewed and updated 2/15/2019.       OBJECTIVE: See Ophthalmology exam    ASSESSMENT:    ICD-10-CM    1. Episodic tension-type headache, not intractable G44.219 EYE EXAM (SIMPLE-NONBILLABLE)   2. High risk medication use Z79.899 EYE EXAM (SIMPLE-NONBILLABLE)   3. Meibomian gland dysfunction H02.889 EYE EXAM (SIMPLE-NONBILLABLE)   4. Myopia of both eyes H52.13 REFRACTION      PLAN:     Patient Instructions   There is no ocular effects due to isotretinoin use causing headaches.    Use one drop of artificial tears both eyes 3-4 x daily.  Continue to use the drops regardless if your eyes are comfortable.  Artificial tears work best as a preventative and not as well after your eyes are starting to bother you.      Return in 1 year for a complete eye exam or sooner if needed.    Prince Fair, OD

## 2019-02-15 NOTE — PATIENT INSTRUCTIONS
There is no ocular effects due to isotretinoin use causing headaches.    Use one drop of artificial tears both eyes 3-4 x daily.  Continue to use the drops regardless if your eyes are comfortable.  Artificial tears work best as a preventative and not as well after your eyes are starting to bother you.      Return in 1 year for a complete eye exam or sooner if needed.    Prince Fair, OD

## 2019-02-16 ENCOUNTER — MYC MEDICAL ADVICE (OUTPATIENT)
Dept: DERMATOLOGY | Facility: CLINIC | Age: 34
End: 2019-02-16

## 2019-02-16 DIAGNOSIS — L70.0 ACNE VULGARIS: ICD-10-CM

## 2019-02-18 RX ORDER — ISOTRETINOIN 40 MG/1
40 CAPSULE ORAL DAILY
Qty: 30 CAPSULE | Refills: 0 | Status: SHIPPED | OUTPATIENT
Start: 2019-02-18 | End: 2019-04-19

## 2019-03-19 ENCOUNTER — DOCUMENTATION ONLY (OUTPATIENT)
Dept: LAB | Facility: CLINIC | Age: 34
End: 2019-03-19

## 2019-03-21 ENCOUNTER — OFFICE VISIT (OUTPATIENT)
Dept: DERMATOLOGY | Facility: CLINIC | Age: 34
End: 2019-03-21
Payer: COMMERCIAL

## 2019-03-21 DIAGNOSIS — Z51.81 MEDICATION MONITORING ENCOUNTER: Primary | ICD-10-CM

## 2019-03-21 DIAGNOSIS — L70.0 ACNE VULGARIS: ICD-10-CM

## 2019-03-21 PROCEDURE — 99213 OFFICE O/P EST LOW 20 MIN: CPT | Performed by: DERMATOLOGY

## 2019-03-21 RX ORDER — ISOTRETINOIN 30 MG/1
CAPSULE ORAL
Qty: 60 CAPSULE | Refills: 0 | Status: SHIPPED | OUTPATIENT
Start: 2019-03-21 | End: 2019-04-19

## 2019-03-21 NOTE — NURSING NOTE
Loco Landeros's goals for this visit include: accutane  He requests these members of his care team be copied on today's visit information: no    PCP: [facility]    Referring Provider:  No referring provider defined for this encounter.    There were no vitals taken for this visit.    Do you need any medication refills at today's visit? No    Allison Salazar LPN

## 2019-03-21 NOTE — PROGRESS NOTES
[facility] Dermatology Note      Dermatology Problem List:  1. Acne vulgaris  -Previous Tx: doxy   -Current Tx: clindamycin, BPO wash, tretinoin.     CC:   Chief Complaint   Patient presents with     Accutane     accutane       Encounter Date: Mar 21, 2019    History of Present Illness:  Mr. Loco Landeros is a 33 year old male who presents for evaluation of folliculitis and acne. The patient was last seen 2/15/19 for his lasted Accutane visit. Today, the patient reports that the hydrocortisone he was prescribed for the beard area seems to be helping. Ophthalmology cleared him after the last visit. The patient reports tolerable mucocutaneous dryness, and denies arthralgias, myalgias, depression, suicidal ideation, diarrhea, headache, or blurred vision.  Has lost weight with decreased diet and exercise.     Past Medical History:   Patient Active Problem List   Diagnosis     Degeneration of lumbar or lumbosacral intervertebral disc     Family history of malignant neoplasm of prostate     Low back pain     Nicotine use disorder     Past Medical History:   Diagnosis Date     NO ACTIVE PROBLEMS      Past Surgical History:   Procedure Laterality Date     COLONOSCOPY WITH CO2 INSUFFLATION N/A 10/2/2017    Procedure: COLONOSCOPY WITH CO2 INSUFFLATION;  Colonoscopy, Vy Argueta, Rectal bleeding, BMI 29.74, [facility] [phone]. ;  Surgeon: Justa Pickard MD;  Location:  OR     NO HISTORY OF SURGERY         Social History:  The patient works as a  for [employer]   He does drink 3-6 alcoholic beverages a week. He does not smoke or chew.     Family History:  No family hx of skin cancer.     Medications:  Current Outpatient Medications   Medication Sig Dispense Refill     hydrocortisone 2.5 % cream Apply three times weekly for 1 month after shaving. Wait at least 10 minutes after shaving. 30 g 1     ISOtretinoin (ACCUTANE) 40 MG capsule Take 1 capsule (40 mg) by  mouth daily 30 capsule 0     Multiple Vitamins-Minerals (MULTIVITAMIN ADULT) CHEW Take 2 chew tab by mouth daily       No Known Allergies      Review of Systems:  -Constitutional:In usual state of health. Admits to mild migraines.  -Skin: As above in HPI. No additional skin concerns.  - Rheum - admits to mild joint pain in left knee.   - Weight has dropped.     Physical exam:  Vitals: There were no vitals taken for this visit.  GEN: This is a well developed, well-nourished male in no acute distress, in a pleasant mood.    SKIN: Acne exam, which includes the face, neck, upper central chest, and upper central back was performed.  - Perifollicular erythema in the beard distribution  - 3 acneiform papules on the right cheek  - 3 acneiform nodules and 2 papules on the back  -No other lesions of concern on areas examined.     Impression/Plan:  1. Acne vulgaris - overall, the patient is continuing to improve on the medication; his back is flared up today with 5 lesions      Increase Accutane 60 mg daily.  One month supply with no refills provided.  Goal dose is 51029 to 28807  mg for 120-150 mg/kg dosing in this 107 kg patient.  We may go lower on this as this is based on current weight. Reviewed need to use condoms  Total cumulative dose 2160 mg.   We will re-weigh next visit.   Patient's I-pledge # is [id number] .   2. Psuedofolliculitis barbae- much improved    Continue  hydrocortisone 2.5% cream,  Recommend decrease to twice weekly    Follow-up 30 days.     Staff Involved:  Staff/Scribe     Scribe Disclosure  I, Juan Miller, am serving as a scribe to document services personally performed by Dr. Ricarda Braba MD, based on data collection and the provider's statements to me.     Provider Disclosure:   The documentation recorded by the scribe accurately reflects the services I personally performed and the decisions made by me.    Ricarda Barba MD    Department of Dermatology  [facility]  [facility]: Phone: [phone], Fax:[phone]  [facility]: Phone: [phone], Fax: [phone]

## 2019-04-19 ENCOUNTER — OFFICE VISIT (OUTPATIENT)
Dept: DERMATOLOGY | Facility: CLINIC | Age: 34
End: 2019-04-19
Payer: COMMERCIAL

## 2019-04-19 DIAGNOSIS — L70.0 ACNE VULGARIS: Primary | ICD-10-CM

## 2019-04-19 DIAGNOSIS — Z51.81 MEDICATION MONITORING ENCOUNTER: ICD-10-CM

## 2019-04-19 LAB
ALT SERPL W P-5'-P-CCNC: 23 U/L (ref 0–70)
AST SERPL W P-5'-P-CCNC: 19 U/L (ref 0–45)
BASOPHILS # BLD AUTO: 0 10E9/L (ref 0–0.2)
BASOPHILS NFR BLD AUTO: 0.5 %
CHOLEST SERPL-MCNC: 206 MG/DL
DIFFERENTIAL METHOD BLD: NORMAL
EOSINOPHIL # BLD AUTO: 0.3 10E9/L (ref 0–0.7)
EOSINOPHIL NFR BLD AUTO: 3.8 %
ERYTHROCYTE [DISTWIDTH] IN BLOOD BY AUTOMATED COUNT: 13.7 % (ref 10–15)
HCT VFR BLD AUTO: 45 % (ref 40–53)
HDLC SERPL-MCNC: 57 MG/DL
HGB BLD-MCNC: 14.9 G/DL (ref 13.3–17.7)
IMM GRANULOCYTES # BLD: 0 10E9/L (ref 0–0.4)
IMM GRANULOCYTES NFR BLD: 0.3 %
LDLC SERPL CALC-MCNC: 124 MG/DL
LYMPHOCYTES # BLD AUTO: 1.7 10E9/L (ref 0.8–5.3)
LYMPHOCYTES NFR BLD AUTO: 26.4 %
MCH RBC QN AUTO: 27.6 PG (ref 26.5–33)
MCHC RBC AUTO-ENTMCNC: 33.1 G/DL (ref 31.5–36.5)
MCV RBC AUTO: 83 FL (ref 78–100)
MONOCYTES # BLD AUTO: 0.6 10E9/L (ref 0–1.3)
MONOCYTES NFR BLD AUTO: 8.7 %
NEUTROPHILS # BLD AUTO: 4 10E9/L (ref 1.6–8.3)
NEUTROPHILS NFR BLD AUTO: 60.3 %
NONHDLC SERPL-MCNC: 149 MG/DL
PLATELET # BLD AUTO: 265 10E9/L (ref 150–450)
RBC # BLD AUTO: 5.4 10E12/L (ref 4.4–5.9)
TRIGL SERPL-MCNC: 126 MG/DL
WBC # BLD AUTO: 6.6 10E9/L (ref 4–11)

## 2019-04-19 PROCEDURE — 84450 TRANSFERASE (AST) (SGOT): CPT | Performed by: DERMATOLOGY

## 2019-04-19 PROCEDURE — 85025 COMPLETE CBC W/AUTO DIFF WBC: CPT | Performed by: DERMATOLOGY

## 2019-04-19 PROCEDURE — 99213 OFFICE O/P EST LOW 20 MIN: CPT | Performed by: DERMATOLOGY

## 2019-04-19 PROCEDURE — 84460 ALANINE AMINO (ALT) (SGPT): CPT | Performed by: DERMATOLOGY

## 2019-04-19 PROCEDURE — 36415 COLL VENOUS BLD VENIPUNCTURE: CPT | Performed by: DERMATOLOGY

## 2019-04-19 PROCEDURE — 80061 LIPID PANEL: CPT | Performed by: DERMATOLOGY

## 2019-04-19 RX ORDER — IBUPROFEN 200 MG
200 TABLET ORAL EVERY 4 HOURS PRN
COMMUNITY
End: 2021-04-01

## 2019-04-19 RX ORDER — ISOTRETINOIN 40 MG/1
40 CAPSULE ORAL 2 TIMES DAILY
Qty: 30 CAPSULE | Refills: 0 | Status: SHIPPED | OUTPATIENT
Start: 2019-04-19 | End: 2019-04-23

## 2019-04-19 RX ORDER — ISOTRETINOIN 30 MG/1
CAPSULE ORAL
Qty: 60 CAPSULE | Refills: 0 | Status: SHIPPED | OUTPATIENT
Start: 2019-04-19 | End: 2019-04-19

## 2019-04-19 ASSESSMENT — PAIN SCALES - GENERAL: PAINLEVEL: NO PAIN (0)

## 2019-04-19 NOTE — PROGRESS NOTES
[facility] Dermatology Note      Dermatology Problem List:  1. Acne vulgaris  -Previous Tx: doxy   -Current Tx: clindamycin, BPO wash, tretinoin.     CC:   Chief Complaint   Patient presents with     Accutane     Pt states back has broken out, and just dryness. No other concerns.        Encounter Date: Apr 19, 2019    History of Present Illness:  Mr. Loco Landeros is a 33 year old male who presents for evaluation of folliculitis and acne. The patient was last seen 3/21/19 when he increased his isotretinoin dose to 60 mg daily. Today, the patient rports that his back has flared. However, he feels that his face has improved. The patient reports tolerable mucocutaneous dryness, and denies arthralgias, myalgias, depression, suicidal ideation, diarrhea, headache, or blurred vision.      Past Medical History:   Patient Active Problem List   Diagnosis     Degeneration of lumbar or lumbosacral intervertebral disc     Family history of malignant neoplasm of prostate     Low back pain     Nicotine use disorder     Past Medical History:   Diagnosis Date     NO ACTIVE PROBLEMS      Past Surgical History:   Procedure Laterality Date     COLONOSCOPY WITH CO2 INSUFFLATION N/A 10/2/2017    Procedure: COLONOSCOPY WITH CO2 INSUFFLATION;  Colonoscopy, Vy Argueta, Rectal bleeding, BMI 29.74, [facility] [phone]. ;  Surgeon: Justa Pickard MD;  Location:  OR     NO HISTORY OF SURGERY         Social History:  The patient works as a  for [employer].   He does drink 3-6 alcoholic beverages a week. He does not smoke or chew.     Family History:  No family hx of skin cancer.     Medications:  Current Outpatient Medications   Medication Sig Dispense Refill     hydrocortisone 2.5 % cream Apply three times weekly for 1 month after shaving. Wait at least 10 minutes after shaving. 30 g 1     ibuprofen (ADVIL/MOTRIN) 200 MG tablet Take 200 mg by mouth every 4 hours as needed for  mild pain       ISOtretinoin (ABSORICA) 30 MG capsule Take 60mg daily. 60 capsule 0     Multiple Vitamins-Minerals (MULTIVITAMIN ADULT) CHEW Take 2 chew tab by mouth daily       ISOtretinoin (ACCUTANE) 40 MG capsule Take 1 capsule (40 mg) by mouth daily (Patient not taking: Reported on 4/19/2019) 30 capsule 0     No Known Allergies      Review of Systems:  -Constitutional:In usual state of health.  -Skin: As above in HPI. No additional skin concerns.    Physical exam:  Vitals: There were no vitals taken for this visit.  GEN: This is a well developed, well-nourished male in no acute distress, in a pleasant mood.    SKIN: Acne exam, which includes the face, neck, upper central chest, and upper central back was performed.  - Acneiform papules x6 and one pustule on the back  - Acneiform papule on the right cheek.   -No other lesions of concern on areas examined.     Impression/Plan:  1. Acne vulgaris - overall, the patient is continuing to improve on the medication; his back is flared up today with 5 lesions, he would like to increase and this would be safe.       Increase Accutane 80 mg daily.  One month supply with no refills provided.  Goal dose is 22226 to 79426  mg for 120-150 mg/kg dosing in this 107 kg patient.  We may go lower on this as this is based on current weight. Reviewed need to use condoms  Total cumulative dose 3900 mg.   The patient says he has lost weight - we will weigh him at the next visit.  Patient's I-pledge # is [id number] .   2. Psuedofolliculitis barbae- much improved    Continue  hydrocortisone 2.5% cream,  Recommend decrease to twice weekly    Follow-up 30 days.     Staff Involved:  Staff/Scribe     Scribe Disclosure  I, Juan Miller, am serving as a scribe to document services personally performed by Dr. Ricarda Barba MD, based on data collection and the provider's statements to me.     Provider Disclosure:   The documentation recorded by the scribe accurately reflects the services I  personally performed and the decisions made by me.    Ricarda Barba MD    Department of Dermatology  [facility]: Phone: [phone], Fax:[phone]  [facility]: Phone: [phone], Fax: [phone]

## 2019-04-19 NOTE — NURSING NOTE
Loco Landeros's goals for this visit include:   Chief Complaint   Patient presents with     Accutane     Pt states back has broken out, and just dryness. No other concerns.      He requests these members of his care team be copied on today's visit information:     PCP: [facility]    Referring Provider:  No referring provider defined for this encounter.    There were no vitals taken for this visit.    Do you need any medication refills at today's visit? No    Irene Belle LPN

## 2019-04-19 NOTE — LETTER
4/19/2019         RE: Loco Landeros  [address] [facility] [address]        Dear Colleague,    Thank you for referring your patient, Loco Landeros, to the [facility]. Please see a copy of my visit note below.    [facility] Dermatology Note      Dermatology Problem List:  1. Acne vulgaris  -Previous Tx: doxy   -Current Tx: clindamycin, BPO wash, tretinoin.     CC:   Chief Complaint   Patient presents with     Accutane     Pt states back has broken out, and just dryness. No other concerns.        Encounter Date: Apr 19, 2019    History of Present Illness:  Mr. Loco Landeros is a 33 year old male who presents for evaluation of folliculitis and acne. The patient was last seen 3/21/19 when he increased his isotretinoin dose to 60 mg daily. Today, the patient rports that his back has flared. However, he feels that his face has improved. The patient reports tolerable mucocutaneous dryness, and denies arthralgias, myalgias, depression, suicidal ideation, diarrhea, headache, or blurred vision.      Past Medical History:   Patient Active Problem List   Diagnosis     Degeneration of lumbar or lumbosacral intervertebral disc     Family history of malignant neoplasm of prostate     Low back pain     Nicotine use disorder     Past Medical History:   Diagnosis Date     NO ACTIVE PROBLEMS      Past Surgical History:   Procedure Laterality Date     COLONOSCOPY WITH CO2 INSUFFLATION N/A 10/2/2017    Procedure: COLONOSCOPY WITH CO2 INSUFFLATION;  Colonoscopy, Vy Argueta, Rectal bleeding, BMI 29.74, [facility] [phone]. ;  Surgeon: Justa Pickard MD;  Location:  OR     NO HISTORY OF SURGERY         Social History:  The patient works as a  for [employer].   He does drink 3-6 alcoholic beverages a week. He does not smoke or chew.     Family History:  No family hx of skin cancer.     Medications:  Current Outpatient Medications    Medication Sig Dispense Refill     hydrocortisone 2.5 % cream Apply three times weekly for 1 month after shaving. Wait at least 10 minutes after shaving. 30 g 1     ibuprofen (ADVIL/MOTRIN) 200 MG tablet Take 200 mg by mouth every 4 hours as needed for mild pain       ISOtretinoin (ABSORICA) 30 MG capsule Take 60mg daily. 60 capsule 0     Multiple Vitamins-Minerals (MULTIVITAMIN ADULT) CHEW Take 2 chew tab by mouth daily       ISOtretinoin (ACCUTANE) 40 MG capsule Take 1 capsule (40 mg) by mouth daily (Patient not taking: Reported on 4/19/2019) 30 capsule 0     No Known Allergies      Review of Systems:  -Constitutional:In usual state of health.  -Skin: As above in HPI. No additional skin concerns.    Physical exam:  Vitals: There were no vitals taken for this visit.  GEN: This is a well developed, well-nourished male in no acute distress, in a pleasant mood.    SKIN: Acne exam, which includes the face, neck, upper central chest, and upper central back was performed.  - Acneiform papules x6 and one pustule on the back  - Acneiform papule on the right cheek.   -No other lesions of concern on areas examined.     Impression/Plan:  1. Acne vulgaris - overall, the patient is continuing to improve on the medication; his back is flared up today with 5 lesions, he would like to increase and this would be safe.       Increase Accutane 80 mg daily.  One month supply with no refills provided.  [place] is [address] to 98719  mg for 120-150 mg/kg dosing in this 107 kg patient.  We may go lower on this as this is based on current weight. Reviewed need to use condoms  Total cumulative dose 3900 mg.   The patient says he has lost weight - we will weigh him at the next visit.  Patient's I-pledge # is [phone] .   2. Psuedofolliculitis barbae- much improved    Continue  hydrocortisone 2.5% cream,  Recommend decrease to twice weekly    Follow-up 30 days.     Staff Involved:  Staff/Scribe     Scribe Disclosure  I, Juan Miller am  serving as a scribe to document services personally performed by Dr. Ricarda Barba MD, based on data collection and the provider's statements to me.     Provider Disclosure:   The documentation recorded by the scribe accurately reflects the services I personally performed and the decisions made by me.    Ricarda Barba MD    Department of Dermatology  [facility]: Phone: [phone], Fax:[phone]  [facility]: Phone: [phone], Fax: [phone]                Again, thank you for allowing me to participate in the care of your patient.        Sincerely,        Ricarda Barba MD

## 2019-05-08 ENCOUNTER — MYC MEDICAL ADVICE (OUTPATIENT)
Dept: DERMATOLOGY | Facility: CLINIC | Age: 34
End: 2019-05-08

## 2019-05-14 ENCOUNTER — DOCUMENTATION ONLY (OUTPATIENT)
Dept: LAB | Facility: CLINIC | Age: 34
End: 2019-05-14

## 2019-05-14 NOTE — PROGRESS NOTES
Dr. Barba - pt is scheduled for a lab appt on 5/16.  Are labs needed prior to Accutane visit?  Per 4/19/19 office visit:    Impression/Plan:  1. Acne vulgaris - overall, the patient is continuing to improve on the medication; his back is flared up today with 5 lesions, he would like to increase and this would be safe.        Increase Accutane 80 mg daily.  One month supply with no refills provided.  Goal dose is 60222 to 34962  mg for 120-150 mg/kg dosing in this 107 kg patient.  We may go lower on this as this is based on current weight. Reviewed need to use condoms    Total cumulative dose 3900 mg.     The patient says he has lost weight - we will weigh him at the next visit.    Patient's I-pledge # is [phone] .   2. Psuedofolliculitis barbae- much improved    Continue  hydrocortisone 2.5% cream,  Recommend decrease to twice weekly      Component      Latest Ref Rng & Units 4/19/2019   WBC      4.0 - 11.0 10e9/L 6.6   RBC Count      4.4 - 5.9 10e12/L 5.40   Hemoglobin      13.3 - 17.7 g/dL 14.9   Hematocrit      40.0 - 53.0 % 45.0   MCV      78 - 100 fl 83   MCH      26.5 - 33.0 pg 27.6   MCHC      31.5 - 36.5 g/dL 33.1   RDW      10.0 - 15.0 % 13.7   Platelet Count      150 - 450 10e9/L 265   Diff Method       Automated Method   % Neutrophils      % 60.3   % Lymphocytes      % 26.4   % Monocytes      % 8.7   % Eosinophils      % 3.8   % Basophils      % 0.5   % Immature Granulocytes      % 0.3   Absolute Neutrophil      1.6 - 8.3 10e9/L 4.0   Absolute Lymphocytes      0.8 - 5.3 10e9/L 1.7   Absolute Monocytes      0.0 - 1.3 10e9/L 0.6   Absolute Eosinophils      0.0 - 0.7 10e9/L 0.3   Absolute Basophils      0.0 - 0.2 10e9/L 0.0   Abs Immature Granulocytes      0 - 0.4 10e9/L 0.0   Cholesterol      <200 mg/dL 206 (H)   Triglycerides      <150 mg/dL 126   HDL Cholesterol      >39 mg/dL 57   LDL Cholesterol Calculated      <100 mg/dL 124 (H)   Non HDL Cholesterol      <130 mg/dL 149 (H)   AST      0 - 45 U/L 19    ALT      0 - 70 U/L 23

## 2019-05-15 ENCOUNTER — DOCUMENTATION ONLY (OUTPATIENT)
Dept: DERMATOLOGY | Facility: CLINIC | Age: 34
End: 2019-05-15

## 2019-05-15 NOTE — TELEPHONE ENCOUNTER
You can review [facility] him by phone what we found.     All of this taken into consideration, he is on 80 daily so waiting will when he is done, 90 days, is best.   thanks

## 2019-05-15 NOTE — TELEPHONE ENCOUNTER
"Will review with patient at follow up appointment 5/16/19.  Per Dr. Barba: \"[facility] her ready myc[facility]     My dermatology textbook (Lyndon) says that any risk to the developing fetus is unlikely. There are 4 reprots of fetuses with malformations while the father was accutane, however, non of these malformations fit that of being on the drug.     Also Per a pubmed article \"No recommendation to discontinue isotretinoin when planning to conceive. Consider using protection   when engaging in sexual intercourse while taking the medication when the female partner is pregnant.\"   Leonela Chance et al. \"Dermatological medication effects on male fertility.\" Dermatologic therapy 26.4 (2013): 337-346.     BUT THIS ARTICLE SAYS--discontinue if pt wants to conceive   EBONIE Simons, et al. \"The impact of drugs on male fertility: a review.\" Andrology 5.4 (2017): 640-663.\"    Per Jackie Gómez, Pharm D.:  \"I found the similar information as Dr. Barba. Including the following:     Isotretinoin is found in the semen of male patients taking isotretinoin, but the amount delivered to a female partner would be about 1 million times lower than an oral dose of 40 mg. While the no-effect limit for isotretinoin-induced embryopathy is unknown, 20 years of postmarketing reports include 4 with isolated defects compatible with features of retinoid-exposed fetuses; however, 2 of these reports were incomplete, and 2 had other possible explanations for the defects observed. https://www.BringMeTheNews/adMingle - Share Your Passion!/rems/pdf/resources/Prescriber%20Isotretinoin%20Educational%20Kit.pdf     It doesn't appear that tretinoin needs to be discontinued but as the article below states \"Consider using protection when engaging in sexual intercourse while taking the medication when thefemale partner is pregnant.\"       "

## 2019-05-16 ENCOUNTER — OFFICE VISIT (OUTPATIENT)
Dept: DERMATOLOGY | Facility: CLINIC | Age: 34
End: 2019-05-16
Payer: COMMERCIAL

## 2019-05-16 VITALS — WEIGHT: 223.4 LBS | BODY MASS INDEX: 29.88 KG/M2

## 2019-05-16 DIAGNOSIS — L70.0 ACNE VULGARIS: Primary | ICD-10-CM

## 2019-05-16 DIAGNOSIS — Z51.81 MEDICATION MONITORING ENCOUNTER: ICD-10-CM

## 2019-05-16 LAB
ALT SERPL W P-5'-P-CCNC: 26 U/L (ref 0–70)
AST SERPL W P-5'-P-CCNC: 19 U/L (ref 0–45)
BASOPHILS # BLD AUTO: 0 10E9/L (ref 0–0.2)
BASOPHILS NFR BLD AUTO: 0.3 %
CHOLEST SERPL-MCNC: 209 MG/DL
DIFFERENTIAL METHOD BLD: NORMAL
EOSINOPHIL # BLD AUTO: 0.2 10E9/L (ref 0–0.7)
EOSINOPHIL NFR BLD AUTO: 2.4 %
ERYTHROCYTE [DISTWIDTH] IN BLOOD BY AUTOMATED COUNT: 14.1 % (ref 10–15)
HCT VFR BLD AUTO: 46.1 % (ref 40–53)
HDLC SERPL-MCNC: 56 MG/DL
HGB BLD-MCNC: 15.1 G/DL (ref 13.3–17.7)
IMM GRANULOCYTES # BLD: 0 10E9/L (ref 0–0.4)
IMM GRANULOCYTES NFR BLD: 0.1 %
LDLC SERPL CALC-MCNC: 129 MG/DL
LYMPHOCYTES # BLD AUTO: 1.6 10E9/L (ref 0.8–5.3)
LYMPHOCYTES NFR BLD AUTO: 19.6 %
MCH RBC QN AUTO: 27.8 PG (ref 26.5–33)
MCHC RBC AUTO-ENTMCNC: 32.8 G/DL (ref 31.5–36.5)
MCV RBC AUTO: 85 FL (ref 78–100)
MONOCYTES # BLD AUTO: 0.9 10E9/L (ref 0–1.3)
MONOCYTES NFR BLD AUTO: 11 %
NEUTROPHILS # BLD AUTO: 5.3 10E9/L (ref 1.6–8.3)
NEUTROPHILS NFR BLD AUTO: 66.6 %
NONHDLC SERPL-MCNC: 153 MG/DL
PLATELET # BLD AUTO: 264 10E9/L (ref 150–450)
RBC # BLD AUTO: 5.44 10E12/L (ref 4.4–5.9)
TRIGL SERPL-MCNC: 119 MG/DL
WBC # BLD AUTO: 8 10E9/L (ref 4–11)

## 2019-05-16 PROCEDURE — 36415 COLL VENOUS BLD VENIPUNCTURE: CPT | Performed by: DERMATOLOGY

## 2019-05-16 PROCEDURE — 99213 OFFICE O/P EST LOW 20 MIN: CPT | Performed by: DERMATOLOGY

## 2019-05-16 PROCEDURE — 84450 TRANSFERASE (AST) (SGOT): CPT | Performed by: DERMATOLOGY

## 2019-05-16 PROCEDURE — 85025 COMPLETE CBC W/AUTO DIFF WBC: CPT | Performed by: DERMATOLOGY

## 2019-05-16 PROCEDURE — 80061 LIPID PANEL: CPT | Performed by: DERMATOLOGY

## 2019-05-16 PROCEDURE — 84460 ALANINE AMINO (ALT) (SGPT): CPT | Performed by: DERMATOLOGY

## 2019-05-16 RX ORDER — ISOTRETINOIN 40 MG/1
40 CAPSULE ORAL 2 TIMES DAILY
Qty: 30 CAPSULE | Refills: 0 | Status: SHIPPED | OUTPATIENT
Start: 2019-05-16 | End: 2019-05-30

## 2019-05-16 ASSESSMENT — PAIN SCALES - GENERAL: PAINLEVEL: NO PAIN (0)

## 2019-05-16 NOTE — PROGRESS NOTES
[facility] Dermatology Note      Dermatology Problem List:  1. Acne vulgaris  -Previous Tx: doxycyline   -Current Tx: clindamycin, BPO wash, tretinoin.     CC:   Chief Complaint   Patient presents with     Accutane     Going well seeing improvement       Encounter Date: May 16, 2019    History of Present Illness:  Mr. Loco Landeros is a 34 year old male who presents for evaluation of folliculitis and acne. The patient was last seen 4/19/19 when he increased his isotretinoin dose to 80 mg daily.  Today, he says he is doing well. He reports that his acne is better and he is breaking out less.  He is concerned about planning to conceive while on Accutane. No changes in medical problems. The patient reports tolerable mucocutaneous dryness, and denies arthralgias, myalgias, depression, suicidal ideation, diarrhea, headache, or blurred vision. No other areas of concern.       Past Medical History:   Patient Active Problem List   Diagnosis     Degeneration of lumbar or lumbosacral intervertebral disc     Family history of malignant neoplasm of prostate     Low back pain     Nicotine use disorder     Acne vulgaris     Past Medical History:   Diagnosis Date     NO ACTIVE PROBLEMS      Past Surgical History:   Procedure Laterality Date     COLONOSCOPY WITH CO2 INSUFFLATION N/A 10/2/2017    Procedure: COLONOSCOPY WITH CO2 INSUFFLATION;  Colonoscopy, Vy Argueta, Rectal bleeding, BMI 29.74, [facility] [phone]. ;  Surgeon: Justa Pickard MD;  Location:  OR     NO HISTORY OF SURGERY         Social History:  The patient works as a  for [employer].   He does drink 3-6 alcoholic beverages a week. He does not smoke or chew.     Family History:  No family hx of skin cancer.     Medications:  Current Outpatient Medications   Medication Sig Dispense Refill     hydrocortisone 2.5 % cream Apply three times weekly for 1 month after shaving. Wait at least 10 minutes after  shaving. 30 g 1     ibuprofen (ADVIL/MOTRIN) 200 MG tablet Take 200 mg by mouth every 4 hours as needed for mild pain       ISOtretinoin (ACCUTANE) 40 MG capsule Take 1 capsule (40 mg) by mouth 2 times daily 30 capsule 0     Multiple Vitamins-Minerals (MULTIVITAMIN ADULT) CHEW Take 2 chew tab by mouth daily       No Known Allergies      Review of Systems:  -Constitutional: In usual state of health.  -Skin: As above in HPI. No additional skin concerns    Physical exam:  Vitals: There were no vitals taken for this visit.  GEN: This is a well developed, well-nourished male in no acute distress, in a pleasant mood.    SKIN: Acne exam, which includes the face, neck, upper central chest, and upper central back was performed.    - 2 acneiform papules on the right temple and 2 on the right jawline  - Back is clear  Skin is tan  - No other lesions of concern on areas examined.   Component      Latest Ref Rng & Units 4/19/2019   WBC      4.0 - 11.0 1e9/L 6.6   RBC Count      4.4 - 5.9 10e12/L 5.40   Hemoglobin      13.3 - 17.7 g/dL 14.9   Hematocrit      40.0 - 53.0 % 45.0   MCV      78 - 100 fl 83   MCH      26.5 - 33.0 pg 27.6   MCHC      31.5 - 36.5 g/dL 33.1   RDW      10.0 - 15.0 % 13.7   Platelet Count      150 - 450 10e9/L 265   Diff Method       Automated Method   % Neutrophils      % 60.3   % Lymphocytes      % 26.4   % Monocytes      % 8.7   % Eosinophils      % 3.8   % Basophils      % 0.5   % Immature Granulocytes      % 0.3   Absolute Neutrophil      1.6 - 8.3 10e9/L 4.0   Absolute Lymphocytes      0.8 - 5.3 10e9/L 1.7   Absolute Monocytes      0.0 - 1.3 10e9/L 0.6   Absolute Eosinophils      0.0 - 0.7 10e9/L 0.3   Absolute Basophils      0.0 - 0.2 10e9/L 0.0   Abs Immature Granulocytes      0 - 0.4 10e9/L 0.0   Cholesterol      <200 mg/dL 206 (H)   Triglycerides      <150 mg/dL 126   HDL Cholesterol      >39 mg/dL 57   LDL Cholesterol Calculated      <100 mg/dL 124 (H)   Non HDL Cholesterol      <130 mg/dL 149  (H)   AST      0 - 45 U/L 19   ALT      0 - 70 U/L 23     Impression/Plan:  1. Acne vulgaris - overall improved       Continue Accutane 80 mg daily.  One month supply with no refills provided.  Goal dose is 61983 to 15,150  mg for 120-150 mg/kg dosing in this 107 kg patient.  We may go lower on this as this is based on current weight. Reviewed need to use condoms. Suggested to wait to conceive until after Accutane is complete  Total cumulative dose 6160 mg.   The patient says he has lost weight - we will weigh him at the next visit.  Patient's I-pledge # is [phone] .   2. Psuedofolliculitis barbae- much improved    Continue hydrocortisone 2.5% cream,  recommend decrease to twice weekly only as needed  3. Tan skin- recommend sun protection with sunscreen and protective clothing  Follow-up in 30 days.      Staff Involved:  Staff/Scribe     Scribe Disclosure  I, Jasmyn Hartley, am serving as a scribe to document services personally performed by Dr. Ricarda Barba MD, based on data collection and the provider's statements to me.     Provider Disclosure:   The documentation recorded by the scribe accurately reflects the services I personally performed and the decisions made by me.    Ricarda Barba MD    Department of Dermatology  [facility]: Phone: [phone], Fax:[phone]  [facility]: Phone: [phone], Fax: [phone]

## 2019-05-16 NOTE — NURSING NOTE
The following medication was given:     MEDICATION:  Lidocaine with epinephrine 1% 1:[id number]  ROUTE: SQ  SITE: see procedure note  DOSE: 2cc  LOT #: -[id number]  : Meera  EXPIRATION DATE: 1Feb2020  NDC#: 0468-7615-01   Was there drug waste? No  Multi-dose vial: Yes    Kaylee Carver LPN  May 16, 2019

## 2019-05-16 NOTE — NURSING NOTE
Loco Landeros's goals for this visit include:   Chief Complaint   Patient presents with     Accutane     Going well seeing improvement       He requests these members of his care team be copied on today's visit information: No    PCP: [facility]    Referring Provider:  No referring provider defined for this encounter.    There were no vitals taken for this visit.    Do you need any medication refills at today's visit? Yes, stephie Paz LPN on 5/16/2019 at 8:24 AM

## 2019-05-16 NOTE — LETTER
5/16/2019         RE: Loco Landeros  [address] [facility] [address]        Dear Colleague,    Thank you for referring your patient, Loco Landeros, to the [facility]. Please see a copy of my visit note below.    [facility] Dermatology Note      Dermatology Problem List:  1. Acne vulgaris  -Previous Tx: doxycyline   -Current Tx: clindamycin, BPO wash, tretinoin.     CC:   Chief Complaint   Patient presents with     Accutane     Going well seeing improvement       Encounter Date: May 16, 2019    History of Present Illness:  Mr. Loco Landeros is a 34 year old male who presents for evaluation of folliculitis and acne. The patient was last seen 4/19/19 when he increased his isotretinoin dose to 80 mg daily.  Today, he says he is doing well. He reports that his acne is better and he is breaking out less.  He is concerned about planning to conceive while on Accutane. No changes in medical problems. The patient reports tolerable mucocutaneous dryness, and denies arthralgias, myalgias, depression, suicidal ideation, diarrhea, headache, or blurred vision. No other areas of concern.       Past Medical History:   Patient Active Problem List   Diagnosis     Degeneration of lumbar or lumbosacral intervertebral disc     Family history of malignant neoplasm of prostate     Low back pain     Nicotine use disorder     Acne vulgaris     Past Medical History:   Diagnosis Date     NO ACTIVE PROBLEMS      Past Surgical History:   Procedure Laterality Date     COLONOSCOPY WITH CO2 INSUFFLATION N/A 10/2/2017    Procedure: COLONOSCOPY WITH CO2 INSUFFLATION;  Colonoscopy, Vy Argueta, Rectal bleeding, BMI 29.74, [facility] [place] [phone]. ;  Surgeon: Justa Pickard MD;  Location:  OR     NO HISTORY OF SURGERY         Social History:  The patient works as a  for [employer].   He does drink 3-6 alcoholic beverages a week. He does not smoke or  chew.     Family History:  No family hx of skin cancer.     Medications:  Current Outpatient Medications   Medication Sig Dispense Refill     hydrocortisone 2.5 % cream Apply three times weekly for 1 month after shaving. Wait at least 10 minutes after shaving. 30 g 1     ibuprofen (ADVIL/MOTRIN) 200 MG tablet Take 200 mg by mouth every 4 hours as needed for mild pain       ISOtretinoin (ACCUTANE) 40 MG capsule Take 1 capsule (40 mg) by mouth 2 times daily 30 capsule 0     Multiple Vitamins-Minerals (MULTIVITAMIN ADULT) CHEW Take 2 chew tab by mouth daily       No Known Allergies      Review of Systems:  -Constitutional: In usual state of health.  -Skin: As above in HPI. No additional skin concerns    Physical exam:  Vitals: There were no vitals taken for this visit.  GEN: This is a well developed, well-nourished male in no acute distress, in a pleasant mood.    SKIN: Acne exam, which includes the face, neck, upper central chest, and upper central back was performed.    - 2 acneiform papules on the right temple and 2 on the right jawline  - Back is clear  Skin is tan  - No other lesions of concern on areas examined.   Component      Latest Ref Rng & Units 4/19/2019   WBC      4.0 - 11.0 1e9/L 6.6   RBC Count      4.4 - 5.9 10e12/L 5.40   Hemoglobin      13.3 - 17.7 g/dL 14.9   Hematocrit      40.0 - 53.0 % 45.0   MCV      78 - 100 fl 83   MCH      26.5 - 33.0 pg 27.6   MCHC      31.5 - 36.5 g/dL 33.1   RDW      10.0 - 15.0 % 13.7   Platelet Count      150 - 450 10e9/L 265   Diff Method       Automated Method   % Neutrophils      % 60.3   % Lymphocytes      % 26.4   % Monocytes      % 8.7   % Eosinophils      % 3.8   % Basophils      % 0.5   % Immature Granulocytes      % 0.3   Absolute Neutrophil      1.6 - 8.3 10e9/L 4.0   Absolute Lymphocytes      0.8 - 5.3 10e9/L 1.7   Absolute Monocytes      0.0 - 1.3 10e9/L 0.6   Absolute Eosinophils      0.0 - 0.7 10e9/L 0.3   Absolute Basophils      0.0 - 0.2 10e9/L 0.0   Abs  Immature Granulocytes      0 - 0.4 10e9/L 0.0   Cholesterol      <200 mg/dL 206 (H)   Triglycerides      <150 mg/dL 126   HDL Cholesterol      >39 mg/dL 57   LDL Cholesterol Calculated      <100 mg/dL 124 (H)   Non HDL Cholesterol      <130 mg/dL 149 (H)   AST      0 - 45 U/L 19   ALT      0 - 70 U/L 23     Impression/Plan:  1. Acne vulgaris - overall improved       Continue Accutane 80 mg daily.  One month supply with no refills provided.  Goal dose is [address] to 15,150  mg for 120-150 mg/kg dosing in this 107 kg patient.  We may go lower on this as this is based on current weight. Reviewed need to use condoms. Suggested to wait to conceive until after Accutane is complete  Total cumulative dose 6160 mg.   The patient says he has lost weight - we will weigh him at the next visit.  Patient's I-pledge # is [phone] .   2. Psuedofolliculitis barbae- much improved    Continue hydrocortisone 2.5% cream,  recommend decrease to twice weekly only as needed  3. Tan skin- recommend sun protection with sunscreen and protective clothing  Follow-up in 30 days.      Staff Involved:  Staff/Scribe     Scribe Disclosure  I, Jasmyn Hartley, am serving as a scribe to document services personally performed by Dr. Ricarda Barba MD, based on data collection and the provider's statements to me.     Provider Disclosure:   The documentation recorded by the scribe accurately reflects the services I personally performed and the decisions made by me.    Ricarda Barba MD    Department of Dermatology  [facility]: Phone: [phone], Fax:[phone]  [facility]: Phone: [phone], Fax: [phone]            Again, thank you for allowing me to participate in the care of your patient.        Sincerely,        Ricarda Barba MD

## 2019-05-17 ENCOUNTER — MYC MEDICAL ADVICE (OUTPATIENT)
Dept: DERMATOLOGY | Facility: CLINIC | Age: 34
End: 2019-05-17

## 2019-05-28 DIAGNOSIS — L70.0 ACNE VULGARIS: ICD-10-CM

## 2019-05-29 ENCOUNTER — MYC MEDICAL ADVICE (OUTPATIENT)
Dept: DERMATOLOGY | Facility: CLINIC | Age: 34
End: 2019-05-29

## 2019-05-29 RX ORDER — ISOTRETINOIN 40 MG/1
CAPSULE, LIQUID FILLED ORAL
Qty: 30 CAPSULE | Refills: 0 | OUTPATIENT
Start: 2019-05-29

## 2019-05-29 NOTE — TELEPHONE ENCOUNTER
I spoke with [facility] pharmacy and patient picked up a 15 day supply on 5/8/19.  Pharmacy did not receive the prescription that was sent on 5/16/19, so I left a verbal order with the pharmacist.    Sagrario Salas RN

## 2019-05-30 DIAGNOSIS — L70.0 ACNE VULGARIS: ICD-10-CM

## 2019-05-30 RX ORDER — ISOTRETINOIN 40 MG/1
40 CAPSULE ORAL 2 TIMES DAILY
Qty: 60 CAPSULE | Refills: 0 | Status: SHIPPED | OUTPATIENT
Start: 2019-05-30 | End: 2020-03-23

## 2019-05-30 NOTE — TELEPHONE ENCOUNTER
I called the [facility]li. He would be able to  the second half. They will priya him when he can.     Please, reverify him on ipledge on June 11th to make sure he is qualified.

## 2019-05-30 NOTE — TELEPHONE ENCOUNTER
Routing to Dr. Barba to review and advise    From office visit 5-16-19:  Impression/Plan:  1. Acne vulgaris - overall improved        Continue Accutane 80 mg daily.  One month supply with no refills provided.  Goal dose is 574[address] to 15,150  mg for 120-150 mg/kg dosing in this 107 kg patient.  We may go lower on this as this is based on current weight. Reviewed need to use condoms. Suggested to wait to conceive until after Accutane is complete    Total cumulative dose 6160 mg.     The patient says he has lost weight - we will weigh him at the next visit.    Patient's I-pledge # is [phone] .       You had ordered 30 pills with take 1 capsule two times daily.     Bessie Knutson LPN

## 2019-05-30 NOTE — TELEPHONE ENCOUNTER
It looks like I ordered take twice daily but only wrote it for 30 tabs. I am calling e [facility] to see how to get the second half. I have corrected it now. Please, apologize for any inconvenience.

## 2019-05-31 NOTE — TELEPHONE ENCOUNTER
My chart message sent to patient. Patient to contact clinic if he has troubles picking up the other half of his medication and will follow up as scheduled in clinic on 6-18-19    Bessie Knutson LPN

## 2019-06-11 ENCOUNTER — ANCILLARY PROCEDURE (OUTPATIENT)
Dept: GENERAL RADIOLOGY | Facility: CLINIC | Age: 34
End: 2019-06-11
Attending: NURSE PRACTITIONER
Payer: COMMERCIAL

## 2019-06-11 ENCOUNTER — OFFICE VISIT (OUTPATIENT)
Dept: PEDIATRICS | Facility: CLINIC | Age: 34
End: 2019-06-11
Payer: COMMERCIAL

## 2019-06-11 VITALS
OXYGEN SATURATION: 97 % | SYSTOLIC BLOOD PRESSURE: 122 MMHG | BODY MASS INDEX: 30.2 KG/M2 | TEMPERATURE: 98.2 F | DIASTOLIC BLOOD PRESSURE: 85 MMHG | WEIGHT: 225.8 LBS | HEART RATE: 60 BPM

## 2019-06-11 DIAGNOSIS — M54.50 MIDLINE LOW BACK PAIN WITHOUT SCIATICA, UNSPECIFIED CHRONICITY: Primary | ICD-10-CM

## 2019-06-11 DIAGNOSIS — M54.50 MIDLINE LOW BACK PAIN WITHOUT SCIATICA, UNSPECIFIED CHRONICITY: ICD-10-CM

## 2019-06-11 PROCEDURE — 99214 OFFICE O/P EST MOD 30 MIN: CPT | Performed by: NURSE PRACTITIONER

## 2019-06-11 PROCEDURE — 72100 X-RAY EXAM L-S SPINE 2/3 VWS: CPT | Performed by: RADIOLOGY

## 2019-06-11 RX ORDER — NAPROXEN 500 MG/1
500 TABLET ORAL 2 TIMES DAILY PRN
Qty: 30 TABLET | Refills: 1 | Status: SHIPPED | OUTPATIENT
Start: 2019-06-11 | End: 2019-07-01

## 2019-06-11 RX ORDER — CYCLOBENZAPRINE HCL 10 MG
10 TABLET ORAL 3 TIMES DAILY PRN
Qty: 30 TABLET | Refills: 1 | Status: SHIPPED | OUTPATIENT
Start: 2019-06-11 | End: 2020-03-23

## 2019-06-11 NOTE — NURSING NOTE
"Chief Complaint   Patient presents with     Back Pain     lower back pain, injured back 4 days ago       Initial /85 (BP Location: Right arm, Patient Position: Sitting, Cuff Size: Adult Regular)   Pulse 60   Temp 98.2  F (36.8  C) (Temporal)   Wt 102.4 kg (225 lb 12.8 oz)   SpO2 97%   BMI 30.20 kg/m   Estimated body mass index is 30.2 kg/m  as calculated from the following:    Height as of 12/7/18: 1.842 m (6' 0.5\").    Weight as of this encounter: 102.4 kg (225 lb 12.8 oz).  Medication Reconciliation: complete      MOR Mo      "

## 2019-06-11 NOTE — PATIENT INSTRUCTIONS
PLAN:   1.   Symptomatic therapy suggested:   apply heat to affected area, apply ice to affected area, prescription for muscle relaxant, prescription for NSAID given,  Consider referral to Physical Therapy    2.  Orders Placed This Encounter   Medications     naproxen (NAPROSYN) 500 MG tablet     Sig: Take 1 tablet (500 mg) by mouth 2 times daily as needed     Dispense:  30 tablet     Refill:  1     cyclobenzaprine (FLEXERIL) 10 MG tablet     Sig: Take 1 tablet (10 mg) by mouth 3 times daily as needed for muscle spasms     Dispense:  30 tablet     Refill:  1     Orders Placed This Encounter   Procedures     XR Lumbar Spine 2/3 Views       3. Patient needs to follow up in if no improvement,or sooner if worsening of symptoms or other symptoms develop.  Will follow up and/or notify patient of  results via My Chart to determine further need for followup    Patient Education     Exercises to Strengthen Your Lower Back  Strong lower back and abdominal muscles work together to support your spine. The exercises below will help strengthen the lower back. It is important that you begin exercising slowly and increase levels gradually.  Always begin any exercise program with stretching. If you feel pain while doing any of these exercises, stop and talk to your doctor about a more specific exercise program that better suits your condition.   Low back stretch  The point of stretching is to make you more flexible and increase your range of motion. Stretch only as much as you are able. Stretch slowly. Do not push your stretch to the limit. If at any point you feel pain while stretching, this is your (temporary) limit.    Lie on your back with your knees bent and both feet on the ground.    Slowly raise your left knee to your chest as you flatten your lower back against the floor. Hold for 5 seconds.    Relax and repeat the exercise with your right knee.    Do 10 of these exercises for each leg.    Repeat hugging both knees to your  chest at the same time.  Building lower back strength  Start your exercise routine with 10 to 30 minutes a day, 1 to 3 times a day.  Initial exercises  Lying on your back:  1. Ankle pumps: Move your foot up and down, towards your head, and then away. Repeat 10 times with each foot.  2. Heel slides: Slowly bend your knee, drawing the heel of your foot towards you. Then slide your heel/foot from you, straightening your knee. Do not lift your foot off the floor (this is not a leg lift).  3. Abdominal contraction: Bend your knees and put your hands on your stomach. Tighten your stomach muscles. Hold for 5 seconds, then relax. Repeat 10 times.  4. Straight leg raise: Bend one leg at the knee and keep the other leg straight. Tighten your stomach muscles. Slowly lift your straight leg 6 to 12 inches off the floor and hold for up to 5 seconds. Repeat 10 times on each side.  Standin. Wall squats: Stand with your back against the wall. Move your feet about 12 inches away from the wall. Tighten your stomach muscles, and slowly bend your knees until they are at about a 45 degree angle. Do not go down too far. Hold about 5 seconds. Then slowly return to your starting position. Repeat 10 times.  2. Heel raises: Stand facing the wall. Slowly raise the heels of your feet up and down, while keeping your toes on the floor. If you have trouble balancing, you can touch the wall with your hands. Repeat 10 times.  More advanced exercises  When you feel comfortable enough, try these exercises.  1. Kneeling lumbar extension: Begin on your hands and knees. At the same time, raise and straighten your right arm and left leg until they are parallel to the ground. Hold for 2 seconds and come back slowly to a starting position. Repeat with left arm and right leg, alternating 10 times.  2. Prone lumbar extension: Lie face down, arms extended overhead, palms on the floor. At the same time, raise your right arm and left leg as high as  comfortably possible. Hold for 10 seconds and slowly return to start. Repeat with left arm and right leg, alternating 10 times. Gradually build up to 20 times. (Advanced: Repeat this exercise raising both arms and both legs a few inches off the floor at the same time. Hold for 5 seconds and release.)  3. Pelvic tilt: Lie on the floor on your back with your knees bent at 90 degrees. Your feet should be flat on the floor. Inhale, exhale, then slowly contract your abdominal muscles bringing your navel toward your spine. Let your pelvis rock back until your lower back is flat on the floor. Hold for 10 seconds while breathing smoothly.  4. Abdominal crunch: Perform a pelvic tilt (above) flattening your lower back against the floor. Holding the tension in your abdominal muscles, take another breath and raise your shoulder blades off the ground (this is not a full sit-up). Keep your head in line with your body (don t bend your neck forward). Hold for 2 seconds, then slowly lower.  Date Last Reviewed: 6/1/2016 2000-2018 [employer]. [address] [place] [address], [place] [address]. All rights reserved. This information is not intended as a substitute for professional medical care. Always follow your healthcare professional's instructions.

## 2019-06-11 NOTE — PROGRESS NOTES
Subjective     Loco Landeros is a 34 year old male who presents to clinic today for the following health issues:    HPI   Back Pain       Duration: 4 days        Specific cause: lifting, turning/bending when bending to  something    Description:   Location of pain: low back bilateral, would injury his back twice a year.  Character of pain: muscle spasms, stiffness   Pain radiation:radiates into the bilateral legs and hip  New numbness or weakness in legs, not attributed to pain:  no     Intensity: Currently 1/10    History:   Pain interferes with job: No  History of back problems: recurrent self limited episodes of low back pain in the past  Any previous MRI or X-rays: None  Sees a specialist for back pain:  No  Therapies tried without relief: chiropractor    Alleviating factors:   Improved by: be active, increase water intake, losing weight and stretch      Precipitating factors:  Worsened by: getting up and down, sitting for long periods    MOR Mo        Accompanying Signs & Symptoms:  Risk of Fracture:  None  Risk of Cauda Equina:  None  Risk of Infection:  None  Risk of Cancer:  None  Risk of Ankylosing Spondylitis:  Onset at age <35, male, AND morning back stiffness. no     Has issues about twice a year with his back flaring up   Some radiation of pain into his legs   Has not really had to come before for the pain   Has been to chiropractor in the past periodically     Patient Active Problem List   Diagnosis     Degeneration of lumbar or lumbosacral intervertebral disc     Family history of malignant neoplasm of prostate     Low back pain     Nicotine use disorder     Acne vulgaris     Past Surgical History:   Procedure Laterality Date     COLONOSCOPY WITH CO2 INSUFFLATION N/A 10/2/2017    Procedure: COLONOSCOPY WITH CO2 INSUFFLATION;  Colonoscopy, Vy Argueta, Rectal bleeding, BMI 29.74, [facility] [place] [phone]. ;  Surgeon: Justa Pickard MD;  Location:  OR     NO  HISTORY OF SURGERY         Social History     Tobacco Use     Smoking status: Former Smoker     Packs/day: 0.50     Years: 5.00     Pack years: 2.50     Types: Cigarettes     Smokeless tobacco: Former User     Types: Chew     Quit date: 1/1/2002     Tobacco comment: socially smoker   Substance Use Topics     Alcohol use: Yes     Comment: once a week      Family History   Problem Relation Age of Onset     Prostate Cancer Father      Cancer Father      Hypertension Brother      Coronary Artery Disease Maternal Grandfather      Diabetes No family hx of      Hyperlipidemia No family hx of      Cerebrovascular Disease No family hx of      Breast Cancer No family hx of      Colon Cancer No family hx of      Depression No family hx of      Anxiety Disorder No family hx of      Thyroid Disease No family hx of      Genetic Disorder No family hx of      Asthma No family hx of          Current Outpatient Medications   Medication Sig Dispense Refill     hydrocortisone 2.5 % cream Apply three times weekly for 1 month after shaving. Wait at least 10 minutes after shaving. 30 g 1     ibuprofen (ADVIL/MOTRIN) 200 MG tablet Take 200 mg by mouth every 4 hours as needed for mild pain       ISOtretinoin (ACCUTANE) 40 MG capsule Take 1 capsule (40 mg) by mouth 2 times daily 60 capsule 0     Multiple Vitamins-Minerals (MULTIVITAMIN ADULT) CHEW Take 2 chew tab by mouth daily       No Known Allergies  BP Readings from Last 3 Encounters:   06/11/19 122/85   12/07/18 100/70   02/14/18 124/84    Wt Readings from Last 3 Encounters:   06/11/19 102.4 kg (225 lb 12.8 oz)   05/16/19 101.3 kg (223 lb 6.4 oz)   01/11/19 107.5 kg (237 lb 1.6 oz)           Reviewed and updated as needed this visit by Provider         Review of Systems   ROS COMP: Constitutional, HEENT, cardiovascular, pulmonary, gi and gu systems are negative, except as otherwise noted.      Objective    /85 (BP Location: Right arm, Patient Position: Sitting, Cuff Size: Adult  Regular)   Pulse 60   Temp 98.2  F (36.8  C) (Temporal)   Wt 102.4 kg (225 lb 12.8 oz)   SpO2 97%   BMI 30.20 kg/m    Body mass index is 30.2 kg/m .   Wt Readings from Last 4 Encounters:   06/11/19 102.4 kg (225 lb 12.8 oz)   05/16/19 101.3 kg (223 lb 6.4 oz)   01/11/19 107.5 kg (237 lb 1.6 oz)   12/07/18 106.8 kg (235 lb 6.4 oz)       Physical Exam   GENERAL APPEARANCE: alert, active and no distress  NECK: normal and supple      RESP: lungs clear to auscultation - no rales, rhonchi or wheezes  CV: regular rates and rhythm  ABDOMEN: soft, nontender, without hepatosplenomegaly or masses  MS: extremities normal- no gross deformities noted  ORTHO: Lumber/Thoracic Spine Exam: Tender:  left para lumbar muscles, right para lumbar muscles  Non-tender:  lumbar spinous processes, lumbar facet joints  Range of Motion:  lumbar flexion  full, painful, lumbar extension  full, painful  Strength:  normal  Special tests:  negative straight leg raises  SKIN: no suspicious lesions or rashes  NEURO: Normal strength and tone, mentation intact and speech normal  PSYCH: mentation appears normal and affect normal/bright    Diagnostic Test Results:  Recent Results (from the past 744 hour(s))   XR Lumbar Spine 2/3 Views    Narrative    3 views lumbar spine radiographs 6/11/2019 10:40 AM    History: Midline low back pain without sciatica, unspecified  chronicity    Comparison: None    Findings:    AP, lateral including lumbosacral spot film lateral view views of the  lumbar spine were obtained.    5  lumbar type vertebral bodies with sacralization of L5 are assumed  for the purpose of this dictation.    There is no acute osseous abnormality.      Rudimentary disc at L5-S1. Mild-moderate disc height loss at L3-L4 and  L4-L5. Mild multilevel lower lumbar facet arthropathy.     The visualized bowel gas pattern is non-obstructive.      Impression    Impression:  1. No acute osseous abnormality.  2. Transitional lumbosacral anatomy with  sacralization of L5 assumed  for the purposes of this dictation.   3. Mild-moderate disc height loss at L3-L4 and L4-L5.     YOSELIN YANG MD             Assessment & Plan     Loco was seen today for back pain.    Diagnoses and all orders for this visit:    Midline low back pain without sciatica, unspecified chronicity  -     XR Lumbar Spine 2/3 Views; Future  -     naproxen (NAPROSYN) 500 MG tablet; Take 1 tablet (500 mg) by mouth 2 times daily as needed  -     cyclobenzaprine (FLEXERIL) 10 MG tablet; Take 1 tablet (10 mg) by mouth 3 times daily as needed for muscle spasms           See Patient Instructions  Patient Instructions     PLAN:   1.   Symptomatic therapy suggested:   apply heat to affected area, apply ice to affected area, prescription for muscle relaxant, prescription for NSAID given,  Consider referral to Physical Therapy    2.  Orders Placed This Encounter   Medications     naproxen (NAPROSYN) 500 MG tablet     Sig: Take 1 tablet (500 mg) by mouth 2 times daily as needed     Dispense:  30 tablet     Refill:  1     cyclobenzaprine (FLEXERIL) 10 MG tablet     Sig: Take 1 tablet (10 mg) by mouth 3 times daily as needed for muscle spasms     Dispense:  30 tablet     Refill:  1     Orders Placed This Encounter   Procedures     XR Lumbar Spine 2/3 Views       3. Patient needs to follow up in if no improvement,or sooner if worsening of symptoms or other symptoms develop.  Will follow up and/or notify patient of  results via My Chart to determine further need for followup    Patient Education       No follow-ups on file.    ESSIE Petersen CNP  [facility]

## 2019-06-12 ENCOUNTER — MYC MEDICAL ADVICE (OUTPATIENT)
Dept: PEDIATRICS | Facility: CLINIC | Age: 34
End: 2019-06-12

## 2019-06-12 NOTE — RESULT ENCOUNTER NOTE
Wesly Landeros,    Attached are your test results.  As we suspected is some mild to moderate signs of degenerative changes which is very common. Let me know if you want to try some physical therapy and I can place the order for you   Please contact us if you have any questions.    Vy Argueta, CNP

## 2019-06-17 NOTE — TELEPHONE ENCOUNTER
Called patient, left message with phone number to call back.       Also sent message back via [employer].    Gayathri Sotelo RN, [facility]

## 2019-06-18 NOTE — TELEPHONE ENCOUNTER
Called patient.  He wanted to know more about the jargon in the result note from the radiologist.    His questions are:    Is there a specific area of damage that causes symptoms?    What is rudimentary disc at L5-S1 mean.    What is Mild multilevel lower facet arthropathy mean    lumbar type vertebral bodies with sacralization of L5 are assumed  for the purpose of this dictation.    Did try to explain as much as possible.  Did also state that the Physical Therapist will be able to help explain the results.    Will route to Christine Argueta NP to please advise. Ok to leave detailed message.    Gayathri Sotelo RN, [facility]

## 2019-06-18 NOTE — TELEPHONE ENCOUNTER
Patient sent message back via [employer] asking to call him at 2:30 pm.    Gayathri Sotelo RN, [facility]

## 2019-06-20 ENCOUNTER — THERAPY VISIT (OUTPATIENT)
Dept: PHYSICAL THERAPY | Facility: CLINIC | Age: 34
End: 2019-06-20
Attending: NURSE PRACTITIONER
Payer: COMMERCIAL

## 2019-06-20 DIAGNOSIS — M54.50 MIDLINE LOW BACK PAIN WITHOUT SCIATICA, UNSPECIFIED CHRONICITY: ICD-10-CM

## 2019-06-20 PROCEDURE — 97530 THERAPEUTIC ACTIVITIES: CPT | Mod: GP | Performed by: PHYSICAL THERAPIST

## 2019-06-20 PROCEDURE — 97110 THERAPEUTIC EXERCISES: CPT | Mod: GP | Performed by: PHYSICAL THERAPIST

## 2019-06-20 PROCEDURE — 97161 PT EVAL LOW COMPLEX 20 MIN: CPT | Mod: GP | Performed by: PHYSICAL THERAPIST

## 2019-06-20 NOTE — PROGRESS NOTES
[facility] Initial Evaluation -- Lumbar    Date: June 20, 2019  Loco Landeros is a 34 year old male with a Low back condition.   Referral: See chart  Work mechanical stresses:  Sitting  [id number] status:  Sales  Leisure mechanical stresses: Bending, Lifting  Functional disability score (PRASANNA/STarT Back):  See chart  VAS score (0-10): 2/10  Patient goals/expectations:  Decrease amount of flare-ups    HISTORY:    Present symptoms: Current right low back-has been both sides in the past  Pain quality (sharp/shooting/stabbing/aching/burning/cramping):  Sharp, stabbing-stiffness   Paresthesia (yes/no):  no    Present since (onset date): 5+ years ago-flares up about 2x/year-most resent 6/3 (about 2 weeks ago).     Symptoms (improving/unchanging/worsening):  Unchanging-this episode getting better.     Symptoms commenced as a result of: Bending and lifting his kid  Condition occurred in the following environment:   Home     Symptoms at onset (back/thigh/leg): back  Constant symptoms (back/thigh/leg): none  Intermittent symptoms (back/thigh/leg): back    Symptoms are made worse with the following: Always Bending, Time of day - No effect and Other - Lifting   Symptoms are made better with the following: Other - Ice,NSAIDS, stretching, Heat-Sometimes    Disturbed sleep (yes/no):  No Sleeping postures (prone/sup/side R/L): Back and sides    Previous episodes (0/1-5/6-10/11+): 10+ Year of first episode: 2014    Previous history: Has a history of back flaring up about 2x/year  Previous treatments: NSAIDs      Specific Questions:  Cough/Sneeze/Strain (pos/neg): Neg  Bowel/Bladder (normal/abnormal): normal  Gait (normal/abnormal): normal  Medications (nil/NSAIDS/analg/steroids/anticoag/other):  NSAIDS  Medical allergies:  None  General health (excellent/good/fair/poor):  Good  Pertinent medical history:  None  Imaging (None/Xray/MRI/Other):  X-ray  Recent or major surgery (yes/no):  No  Night pain (yes/no):  No  Accidents (yes/no): No  Unexplained weight loss (yes/no): No  Barriers at home: No  Other red flags: No    EXAMINATION    Posture:   Sitting (good/fair/poor): Fair  Standing (good/fair/poor):Good  Lordosis (red/acc/normal): Normal  Correction of posture (better/worse/no effect): Better      Lateral Shift (right/left/nil): Left  Relevant (yes/no):  yes  Other Observations:     Neurological:    Motor deficit:  All LE strength 5/  Dural signs:  Slump (+) right    Movement Loss:   Brendan Mod Min Nil Pain   Flexion   X  PDM   Extension  X   PDM   Side Gliding R X    PDM   Side Gliding L   X  PDM     Test Movements:   During: produces, abolishes, increases, decreases, no effect, centralizing, peripheralizing   After: better, worse, no better, no worse, no effect, centralized, peripheralized    Pretest symptoms standing: Right Low back   Symptoms During Symptoms After ROM increased ROM decreased No Effect   FIS Increases    Worse      X   Rep FIS          EIS Increases    No Effect      X   Rep EIS Increases    No Effect      X   Pretest symptoms lying:     Symptoms During Symptoms After ROM increased ROM decreased No Effect   KYLEIGH          Rep KYLEIGH          EIL Increases    No Worse         Rep EIL Increases    No Worse     X    If required, pretest symptoms:    Symptoms During Symptoms After ROM increased ROM decreased No Effect   SGIS - R Increases    Better         Rep SGIS - R Increases    Better    X     SGIS - L          Rep SGIS - L            Static Tests:  Sitting slouched:    Sitting erect:    Standing slouched   Standing erect:    Lying prone in extension:   Long sitting:      Other Tests:     Provisional Classification:  Derangement - Asymmetrical, unilateral, symptoms above knee    Principle of Management:  Education:  Anatomy, posture, bending and lifting     Equipment provided:  Lumbar roll  Mechanical therapy (Y/N):  Y   Extension principle:    Lateral Principle:  SGIS (R) 10x 8x/day  Flexion principle:         ASSESSMENT/PLAN:    Patient is a 34 year old male with lumbar complaints.    Patient has the following significant findings with corresponding treatment plan.                Diagnosis 1:  Low back  Pain -  manual therapy, self management, education and directional preference exercise  Decreased ROM/flexibility - manual therapy and therapeutic exercise  Impaired muscle performance - neuro re-education  Decreased function - therapeutic activities  Impaired posture - neuro re-education    Therapy Evaluation Codes:   1) History comprised of:   Personal factors that impact the plan of care:      None.    Comorbidity factors that impact the plan of care are:      None.     Medications impacting care: Anti-inflammatory.  2) Examination of Body Systems comprised of:   Body structures and functions that impact the plan of care:      Lumbar spine.   Activity limitations that impact the plan of care are:      Bending.  3) Clinical presentation characteristics are:   Stable/Uncomplicated.  4) Decision-Making    Low complexity using standardized patient assessment instrument and/or measureable assessment of functional outcome.  Cumulative Therapy Evaluation is: Low complexity.    Previous and current functional limitations:  (See Goal Flow Sheet for this information)    Short term and Long term goals: (See Goal Flow Sheet for this information)     Communication ability:  Patient appears to be able to clearly communicate and understand verbal and written communication and follow directions correctly.  Treatment Explanation - The following has been discussed with the patient:   RX ordered/plan of care  Anticipated outcomes  Possible risks and side effects  This patient would benefit from PT intervention to resume normal activities.   Rehab potential is good.    Frequency:  1 X week, once daily  Duration:  for 8 weeks  Discharge Plan:  Achieve all LTG.  Independent in home treatment program.  Reach maximal therapeutic  benefit.    Please refer to the daily flowsheet for treatment today, total treatment time and time spent performing 1:1 timed codes.

## 2019-07-01 DIAGNOSIS — M54.50 MIDLINE LOW BACK PAIN WITHOUT SCIATICA, UNSPECIFIED CHRONICITY: ICD-10-CM

## 2019-07-01 RX ORDER — NAPROXEN 500 MG/1
TABLET ORAL
Qty: 180 TABLET | Refills: 0 | Status: SHIPPED | OUTPATIENT
Start: 2019-07-01 | End: 2020-03-23

## 2019-07-03 ENCOUNTER — THERAPY VISIT (OUTPATIENT)
Dept: PHYSICAL THERAPY | Facility: CLINIC | Age: 34
End: 2019-07-03
Payer: COMMERCIAL

## 2019-07-03 DIAGNOSIS — M54.50 RIGHT-SIDED LOW BACK PAIN WITHOUT SCIATICA: ICD-10-CM

## 2019-07-03 PROCEDURE — 97110 THERAPEUTIC EXERCISES: CPT | Mod: GP | Performed by: PHYSICAL THERAPIST

## 2019-07-03 PROCEDURE — 97140 MANUAL THERAPY 1/> REGIONS: CPT | Mod: GP | Performed by: PHYSICAL THERAPIST

## 2019-07-11 ENCOUNTER — THERAPY VISIT (OUTPATIENT)
Dept: PHYSICAL THERAPY | Facility: CLINIC | Age: 34
End: 2019-07-11
Payer: COMMERCIAL

## 2019-07-11 DIAGNOSIS — M54.50 RIGHT-SIDED LOW BACK PAIN WITHOUT SCIATICA: ICD-10-CM

## 2019-07-11 PROCEDURE — 97140 MANUAL THERAPY 1/> REGIONS: CPT | Mod: GP | Performed by: PHYSICAL THERAPIST

## 2019-07-11 PROCEDURE — 97110 THERAPEUTIC EXERCISES: CPT | Mod: GP | Performed by: PHYSICAL THERAPIST

## 2019-07-25 ENCOUNTER — THERAPY VISIT (OUTPATIENT)
Dept: PHYSICAL THERAPY | Facility: CLINIC | Age: 34
End: 2019-07-25
Payer: COMMERCIAL

## 2019-07-25 DIAGNOSIS — M54.50 RIGHT-SIDED LOW BACK PAIN WITHOUT SCIATICA: ICD-10-CM

## 2019-07-25 PROCEDURE — 97140 MANUAL THERAPY 1/> REGIONS: CPT | Mod: GP | Performed by: PHYSICAL THERAPIST

## 2019-07-25 PROCEDURE — 97110 THERAPEUTIC EXERCISES: CPT | Mod: GP | Performed by: PHYSICAL THERAPIST

## 2019-07-25 NOTE — PROGRESS NOTES
Subjective:  HPI  Oswestry Score: 0 %                 Objective:  System    Physical Exam    General     ROS    Assessment/Plan:    PROGRESS  REPORT    Progress reporting period is from 6/20/19 to 7/25/19.       SUBJECTIVE  Subjective changes noted by patient:   Patient reports the back has been feeling good-no issues.  Does have a golf tournament coming up that he is worried may cause some increased pain.  We discussed to continue with MD medication.  Also continue with HEP of 2x/day unless more stressful activities (bending/lifting) then increase frequency.  Patient feels comfortable with this and will continue independnely.  Will followup with MD as needed in the future if needed if pain increases.    Current pain level is 0/10.     Previous pain level was  2/10.   Changes in function:  Yes (See Goal flowsheet attached for changes in current functional level)  Adverse reaction to treatment or activity: None    OBJECTIVE  Changes noted in objective findings:  Yes,     LROM   FIS nil loss   EIS minimal (25%) loss   SGIS (L) nil loss   SGIS (R) minimal (25%) loss     ASSESSMENT/PLAN  Updated problem list and treatment plan: Diagnosis 1:  Low Back  Decreased ROM/flexibility - home program  STG/LTGs have been met or progress has been made towards goals:  Yes (See Goal flow sheet completed today.)  Assessment of Progress: The patient's condition is improving.  Self Management Plans:  Patient is independent in a home treatment program.  Patient is independent in self management of symptoms.    Recommendations:  This patient is ready to be discharged from therapy and continue their home treatment program.    Please refer to the daily flowsheet for treatment today, total treatment time and time spent performing 1:1 timed codes.

## 2020-01-24 ENCOUNTER — DOCUMENTATION ONLY (OUTPATIENT)
Dept: LAB | Facility: CLINIC | Age: 35
End: 2020-01-24

## 2020-01-24 DIAGNOSIS — Z00.00 ENCOUNTER FOR ROUTINE ADULT HEALTH EXAMINATION WITHOUT ABNORMAL FINDINGS: Primary | ICD-10-CM

## 2020-01-24 NOTE — PROGRESS NOTES
Message left for return call.  No labs due at this time.  Lab appointment to be cancelled and provider will order as appropriate at appointment.    Ly Mcnamara RN

## 2020-01-27 NOTE — PROGRESS NOTES
He is in the process of changing his insurance and will call back to schedule a physical when he gets it figured out.  Suze Iglesias RN

## 2020-01-27 NOTE — PROGRESS NOTES
Routing to Christine Argueta NP as the last lab result note on 12/7/18 said to recheck lipids in 1 year.  Since this is not in RN protocol, lab order pended for provider to review and edit and sign if needed.    Gayathri Sotelo RN, [facility]

## 2020-02-24 ENCOUNTER — DOCUMENTATION ONLY (OUTPATIENT)
Dept: LAB | Facility: CLINIC | Age: 35
End: 2020-02-24

## 2020-02-24 NOTE — PROGRESS NOTES
Routing request for lab orders to Christien Argueta NP to please advise as it does not appear that any labs are due in Health Maintenance.      Gayathri Sotelo RN, [facility]

## 2020-02-25 ENCOUNTER — OFFICE VISIT (OUTPATIENT)
Dept: PEDIATRICS | Facility: CLINIC | Age: 35
End: 2020-02-25
Payer: COMMERCIAL

## 2020-02-25 VITALS
WEIGHT: 224.4 LBS | HEART RATE: 64 BPM | BODY MASS INDEX: 29.74 KG/M2 | DIASTOLIC BLOOD PRESSURE: 70 MMHG | SYSTOLIC BLOOD PRESSURE: 100 MMHG | TEMPERATURE: 98.1 F | OXYGEN SATURATION: 95 % | HEIGHT: 73 IN

## 2020-02-25 DIAGNOSIS — Z00.00 ROUTINE GENERAL MEDICAL EXAMINATION AT A HEALTH CARE FACILITY: ICD-10-CM

## 2020-02-25 DIAGNOSIS — Z13.1 SCREENING FOR DIABETES MELLITUS (DM): ICD-10-CM

## 2020-02-25 DIAGNOSIS — Z13.6 CARDIOVASCULAR SCREENING; LDL GOAL LESS THAN 160: ICD-10-CM

## 2020-02-25 DIAGNOSIS — K64.4 EXTERNAL HEMORRHOIDS: ICD-10-CM

## 2020-02-25 DIAGNOSIS — Z23 NEED FOR TDAP VACCINATION: ICD-10-CM

## 2020-02-25 DIAGNOSIS — K62.5 RECTAL BLEEDING: ICD-10-CM

## 2020-02-25 DIAGNOSIS — Z00.00 ROUTINE GENERAL MEDICAL EXAMINATION AT A HEALTH CARE FACILITY: Primary | ICD-10-CM

## 2020-02-25 LAB
ALBUMIN SERPL-MCNC: 4 G/DL (ref 3.4–5)
ALP SERPL-CCNC: 55 U/L (ref 40–150)
ALT SERPL W P-5'-P-CCNC: 33 U/L (ref 0–70)
ANION GAP SERPL CALCULATED.3IONS-SCNC: 4 MMOL/L (ref 3–14)
AST SERPL W P-5'-P-CCNC: 22 U/L (ref 0–45)
BILIRUB SERPL-MCNC: 0.6 MG/DL (ref 0.2–1.3)
BUN SERPL-MCNC: 19 MG/DL (ref 7–30)
CALCIUM SERPL-MCNC: 8.8 MG/DL (ref 8.5–10.1)
CHLORIDE SERPL-SCNC: 108 MMOL/L (ref 94–109)
CHOLEST SERPL-MCNC: 229 MG/DL
CO2 SERPL-SCNC: 26 MMOL/L (ref 20–32)
CREAT SERPL-MCNC: 1.01 MG/DL (ref 0.66–1.25)
GFR SERPL CREATININE-BSD FRML MDRD: >90 ML/MIN/{1.73_M2}
GLUCOSE SERPL-MCNC: 87 MG/DL (ref 70–99)
HDLC SERPL-MCNC: 69 MG/DL
LDLC SERPL CALC-MCNC: 141 MG/DL
NONHDLC SERPL-MCNC: 160 MG/DL
POTASSIUM SERPL-SCNC: 4.2 MMOL/L (ref 3.4–5.3)
PROT SERPL-MCNC: 7.6 G/DL (ref 6.8–8.8)
SODIUM SERPL-SCNC: 138 MMOL/L (ref 133–144)
TRIGL SERPL-MCNC: 94 MG/DL

## 2020-02-25 PROCEDURE — 80053 COMPREHEN METABOLIC PANEL: CPT | Performed by: NURSE PRACTITIONER

## 2020-02-25 PROCEDURE — 90471 IMMUNIZATION ADMIN: CPT | Performed by: NURSE PRACTITIONER

## 2020-02-25 PROCEDURE — 36415 COLL VENOUS BLD VENIPUNCTURE: CPT | Performed by: NURSE PRACTITIONER

## 2020-02-25 PROCEDURE — 90715 TDAP VACCINE 7 YRS/> IM: CPT | Performed by: NURSE PRACTITIONER

## 2020-02-25 PROCEDURE — 99213 OFFICE O/P EST LOW 20 MIN: CPT | Mod: 25 | Performed by: NURSE PRACTITIONER

## 2020-02-25 PROCEDURE — 99395 PREV VISIT EST AGE 18-39: CPT | Mod: 25 | Performed by: NURSE PRACTITIONER

## 2020-02-25 PROCEDURE — 80061 LIPID PANEL: CPT | Performed by: NURSE PRACTITIONER

## 2020-02-25 ASSESSMENT — MIFFLIN-ST. JEOR: SCORE: 1997.78

## 2020-02-25 NOTE — PATIENT INSTRUCTIONS
PLAN:   1.   Symptomatic therapy suggested:   A high fiber diet with plenty of fluids (up to 8 glasses of water daily) is suggested to relieve these symptoms.  Metamucil, 1 tablespoon or capsules  once  daily can be used to keep bowels regular if needed.  2.  Orders Placed This Encounter   Procedures     TDAP, IM (10 - 64 YRS) - Adacel     VACCINE ADMINISTRATION, INITIAL     Lipid panel reflex to direct LDL Fasting     JUST IN CASE     Comprehensive metabolic panel     3. Patient needs to follow up in if no improvement,or sooner if worsening of symptoms or other symptoms develop.  CONSULTATION/REFERRAL to colorectal specialist   Work on weight loss  Regular exercise  Follow up office visit in one year for annual health maintenance exam, sooner PRN.  Results for orders placed or performed in visit on 02/25/20   Comprehensive metabolic panel     Status: None   Result Value Ref Range    Sodium 138 133 - 144 mmol/L    Potassium 4.2 3.4 - 5.3 mmol/L    Chloride 108 94 - 109 mmol/L    Carbon Dioxide 26 20 - 32 mmol/L    Anion Gap 4 3 - 14 mmol/L    Glucose 87 70 - 99 mg/dL    Urea Nitrogen 19 7 - 30 mg/dL    Creatinine 1.01 0.66 - 1.25 mg/dL    GFR Estimate >90 >60 mL/min/[1.73_m2]    GFR Estimate If Black >90 >60 mL/min/[1.73_m2]    Calcium 8.8 8.5 - 10.1 mg/dL    Bilirubin Total 0.6 0.2 - 1.3 mg/dL    Albumin 4.0 3.4 - 5.0 g/dL    Protein Total 7.6 6.8 - 8.8 g/dL    Alkaline Phosphatase 55 40 - 150 U/L    ALT 33 0 - 70 U/L    AST 22 0 - 45 U/L   Lipid panel reflex to direct LDL Fasting     Status: Abnormal   Result Value Ref Range    Cholesterol 229 (H) <200 mg/dL    Triglycerides 94 <150 mg/dL    HDL Cholesterol 69 >39 mg/dL    LDL Cholesterol Calculated 141 (H) <100 mg/dL    Non HDL Cholesterol 160 (H) <130 mg/dL       Preventive Health Recommendations  Male Ages 26 - 39    Yearly exam:             See your health care provider every year in order to  o   Review health changes.   o   Discuss preventive care.    o    Review your medicines if your doctor has prescribed any.    You should be tested each year for STDs (sexually transmitted diseases), if you re at risk.     After age 35, talk to your provider about cholesterol testing. If you are at risk for heart disease, have your cholesterol tested at least every 5 years.     If you are at risk for diabetes, you should have a diabetes test (fasting glucose).  Shots: Get a flu shot each year. Get a tetanus shot every 10 years.     Nutrition:    Eat at least 5 servings of fruits and vegetables daily.     Eat whole-grain bread, whole-wheat pasta and brown rice instead of white grains and rice.     Get adequate Calcium and Vitamin D.     Lifestyle    Exercise for at least 150 minutes a week (30 minutes a day, 5 days a week). This will help you control your weight and prevent disease.     Limit alcohol to one drink per day.     No smoking.     Wear sunscreen to prevent skin cancer.     See your dentist every six months for an exam and cleaning.

## 2020-02-25 NOTE — NURSING NOTE
Prior to immunization administration, verified patients identity using patient s name and date of birth. Please see Immunization Activity for additional information.     Screening Questionnaire for Adult Immunization    Are you sick today?   No   Do you have allergies to medications, food, a vaccine component or latex?   No   Have you ever had a serious reaction after receiving a vaccination?   No   Do you have a long-term health problem with heart, lung, kidney, or metabolic disease (e.g., diabetes), asthma, a blood disorder, no spleen, complement component deficiency, a cochlear implant, or a spinal fluid leak?  Are you on long-term aspirin therapy?   No   Do you have cancer, leukemia, HIV/AIDS, or any other immune system problem?   No   Do you have a parent, brother, or sister with an immune system problem?   No   In the past 3 months, have you taken medications that affect  your immune system, such as prednisone, other steroids, or anticancer drugs; drugs for the treatment of rheumatoid arthritis, Crohn s disease, or psoriasis; or have you had radiation treatments?   No   Have you had a seizure, or a brain or other nervous system problem?   No   During the past year, have you received a transfusion of blood or blood    products, or been given immune (gamma) globulin or antiviral drug?   No   For women: Are you pregnant or is there a chance you could become       pregnant during the next month?   No   Have you received any vaccinations in the past 4 weeks?   No     Immunization questionnaire answers were all negative.        Per orders of ESSIE Carbajal CNP, injection of tdap given by Jessica Roche CMA. Patient instructed to remain in clinic for 15 minutes afterwards, and to report any adverse reaction to me immediately.       Screening performed by Jessica Roche CMA on 2/25/2020 at 9:30 AM.

## 2020-02-25 NOTE — NURSING NOTE
"Chief Complaint   Patient presents with     Physical     ABDON       Initial /70 (BP Location: Right arm, Patient Position: Sitting, Cuff Size: Adult Large)   Pulse 64   Temp 98.1  F (36.7  C) (Temporal)   Ht 1.848 m (6' 0.75\")   Wt 101.8 kg (224 lb 6.4 oz)   SpO2 95%   BMI 29.81 kg/m   Estimated body mass index is 29.81 kg/m  as calculated from the following:    Height as of this encounter: 1.848 m (6' 0.75\").    Weight as of this encounter: 101.8 kg (224 lb 6.4 oz).  Medication Reconciliation: complete      MOR Mo      "

## 2020-02-25 NOTE — PROGRESS NOTES
3  SUBJECTIVE:   CC: Loco Landeros is an 34 year old male who presents for preventive health visit.     Healthy Habits:    Do you get at least three servings of calcium containing foods daily (dairy, green leafy vegetables, etc.)? yes    Amount of exercise or daily activities, outside of work: 4 day(s) per week    Problems taking medications regularly not applicable    Medication side effects: No    Have you had an eye exam in the past two years? yes    Do you see a dentist twice per year? yes    Do you have sleep apnea, excessive snoring or daytime drowsiness?no      Today's PHQ-2 Score:   PHQ-2 ( 1999 Pfizer) 2/25/2020 6/11/2019   Q1: Little interest or pleasure in doing things 0 0   Q2: Feeling down, depressed or hopeless 0 0   PHQ-2 Score 0 0       Abuse: Current or Past(Physical, Sexual or Emotional)- No  Do you feel safe in your environment? Yes        Social History     Tobacco Use     Smoking status: Former Smoker     Packs/day: 0.50     Years: 5.00     Pack years: 2.50     Types: Cigarettes     Smokeless tobacco: Former User     Types: Chew     Quit date: 1/1/2002     Tobacco comment: socially smoker   Substance Use Topics     Alcohol use: Yes     Comment: once a week      If you drink alcohol do you typically have >3 drinks per day or >7 drinks per week? No                      Last PSA:   PSA   Date Value Ref Range Status   12/07/2018 0.72 0 - 4 ug/L Final     Comment:     Assay Method:  Chemiluminescence using Siemens Vista analyzer       Reviewed orders with patient. Reviewed health maintenance and updated orders accordingly - Yes  Lab work is in process  Labs reviewed in EPIC  BP Readings from Last 3 Encounters:   02/25/20 100/70   06/11/19 122/85   12/07/18 100/70    Wt Readings from Last 3 Encounters:   02/25/20 101.8 kg (224 lb 6.4 oz)   06/11/19 102.4 kg (225 lb 12.8 oz)   05/16/19 101.3 kg (223 lb 6.4 oz)                  Patient Active Problem List   Diagnosis     Degeneration of lumbar or  lumbosacral intervertebral disc     Family history of malignant neoplasm of prostate     Nicotine use disorder     Acne vulgaris     Past Surgical History:   Procedure Laterality Date     COLONOSCOPY WITH CO2 INSUFFLATION N/A 10/2/2017    Procedure: COLONOSCOPY WITH CO2 INSUFFLATION;  Colonoscopy, Vy Argueta, Rectal bleeding, BMI 29.74, [facility] [place] [phone]. ;  Surgeon: Justa Pickard MD;  Location: [facility] OR     NO HISTORY OF SURGERY         Social History     Tobacco Use     Smoking status: Former Smoker     Packs/day: 0.50     Years: 5.00     Pack years: 2.50     Types: Cigarettes     Smokeless tobacco: Former User     Types: Chew     Quit date: 1/1/2002     Tobacco comment: socially smoker   Substance Use Topics     Alcohol use: Yes     Comment: once a week      Family History   Problem Relation Age of Onset     Prostate Cancer Father      Cancer Father      Hypertension Brother      Coronary Artery Disease Maternal Grandfather      Diabetes No family hx of      Hyperlipidemia No family hx of      Cerebrovascular Disease No family hx of      Breast Cancer No family hx of      Colon Cancer No family hx of      Depression No family hx of      Anxiety Disorder No family hx of      Thyroid Disease No family hx of      Genetic Disorder No family hx of      Asthma No family hx of          Current Outpatient Medications   Medication Sig Dispense Refill     hydrocortisone 2.5 % cream Apply three times weekly for 1 month after shaving. Wait at least 10 minutes after shaving. 30 g 1     ibuprofen (ADVIL/MOTRIN) 200 MG tablet Take 200 mg by mouth every 4 hours as needed for mild pain       Multiple Vitamins-Minerals (MULTIVITAMIN ADULT) CHEW Take 2 chew tab by mouth daily       cyclobenzaprine (FLEXERIL) 10 MG tablet Take 1 tablet (10 mg) by mouth 3 times daily as needed for muscle spasms 30 tablet 1     ISOtretinoin (ACCUTANE) 40 MG capsule Take 1 capsule (40 mg) by mouth 2 times daily 60 capsule 0      "naproxen (NAPROSYN) 500 MG tablet TAKE 1 TABLET BY MOUTH TWICE DAILY AS NEEDED 180 tablet 0     No Known Allergies    Reviewed and updated as needed this visit by clinical staff  Problems         Reviewed and updated as needed this visit by Provider  Problems        Past Medical History:   Diagnosis Date     NO ACTIVE PROBLEMS       Past Surgical History:   Procedure Laterality Date     COLONOSCOPY WITH CO2 INSUFFLATION N/A 10/2/2017    Procedure: COLONOSCOPY WITH CO2 INSUFFLATION;  Colonoscopy, Vy Argueta, Rectal bleeding, BMI 29.74, [facility] [place] [phone]. ;  Surgeon: Justa Pickard MD;  Location:  OR     NO HISTORY OF SURGERY         ROS:  CONSTITUTIONAL:POSITIVE  for weight loss and NEGATIVE  for malaise and sweats  INTEGUMENTARY/SKIN: NEGATIVE for worrisome rashes, moles or lesions  EYES: NEGATIVE for vision changes or irritation  ENT: NEGATIVE for ear, mouth and throat problems  RESP:NEGATIVE for significant cough or SOB  CV: NEGATIVE for chest pain, palpitations or peripheral edema  GI: POSITIVE for goes to bathroom for BM 2 to 4 times a day and having more issues with hemorrhoids NEGATIVE for jaundice, nausea, poor appetite, vomiting and weight loss   male: negative for dysuria, hematuria, decreased urinary stream, erectile dysfunction, urethral discharge  MUSCULOSKELETAL:POSITIVE  for back pain in the past but doing well  and NEGATIVE for joint swelling  and joint warmth   NEURO: NEGATIVE for weakness, dizziness or paresthesias  ENDOCRINE: NEGATIVE for temperature intolerance, skin/hair changes  HEME/ALLERGY/IMMUNE: NEGATIVE for bleeding problems  PSYCHIATRIC: NEGATIVE for changes in mood or affect    OBJECTIVE:   /70 (BP Location: Right arm, Patient Position: Sitting, Cuff Size: Adult Large)   Pulse 64   Temp 98.1  F (36.7  C) (Temporal)   Ht 1.848 m (6' 0.75\")   Wt 101.8 kg (224 lb 6.4 oz)   SpO2 95%   BMI 29.81 kg/m     Wt Readings from Last 4 Encounters:   02/25/20 " 101.8 kg (224 lb 6.4 oz)   06/11/19 102.4 kg (225 lb 12.8 oz)   05/16/19 101.3 kg (223 lb 6.4 oz)   01/11/19 107.5 kg (237 lb 1.6 oz)       EXAM:  GENERAL: healthy, alert and no distress  EYES: Eyes grossly normal to inspection and conjunctivae and sclerae normal  HENT: ear canals and TM's normal, nose and mouth without ulcers or lesions  NECK: no adenopathy, no asymmetry, masses, or scars and thyroid normal to palpation  RESP: lungs clear to auscultation - no rales, rhonchi or wheezes  CV: regular rates and rhythm, no murmur, click or rub, peripheral pulses strong and no peripheral edema  ABDOMEN: soft, nontender, no hepatosplenomegaly, no masses and bowel sounds normal   (male): normal male genitalia without lesions or urethral discharge, no hernia  MS: no gross musculoskeletal defects noted, no edema  SKIN: no suspicious lesions or rashes  NEURO: Normal strength and tone, mentation intact and speech normal  PSYCH: mentation appears normal, affect normal/bright  LYMPH: no cervical, supraclavicular, axillary, or inguinal adenopathy    Diagnostic Test Results:  Labs reviewed in Epic  Results for orders placed or performed in visit on 02/25/20   Comprehensive metabolic panel     Status: None   Result Value Ref Range    Sodium 138 133 - 144 mmol/L    Potassium 4.2 3.4 - 5.3 mmol/L    Chloride 108 94 - 109 mmol/L    Carbon Dioxide 26 20 - 32 mmol/L    Anion Gap 4 3 - 14 mmol/L    Glucose 87 70 - 99 mg/dL    Urea Nitrogen 19 7 - 30 mg/dL    Creatinine 1.01 0.66 - 1.25 mg/dL    GFR Estimate >90 >60 mL/min/[1.73_m2]    GFR Estimate If Black >90 >60 mL/min/[1.73_m2]    Calcium 8.8 8.5 - 10.1 mg/dL    Bilirubin Total 0.6 0.2 - 1.3 mg/dL    Albumin 4.0 3.4 - 5.0 g/dL    Protein Total 7.6 6.8 - 8.8 g/dL    Alkaline Phosphatase 55 40 - 150 U/L    ALT 33 0 - 70 U/L    AST 22 0 - 45 U/L   Lipid panel reflex to direct LDL Fasting     Status: Abnormal   Result Value Ref Range    Cholesterol 229 (H) <200 mg/dL    Triglycerides 94  <150 mg/dL    HDL Cholesterol 69 >39 mg/dL    LDL Cholesterol Calculated 141 (H) <100 mg/dL    Non HDL Cholesterol 160 (H) <130 mg/dL       ASSESSMENT/PLAN:   Loco was seen today for physical.    Diagnoses and all orders for this visit:    Routine general medical examination at a health care facility  -     Lipid panel reflex to direct LDL Fasting; Future  -     JUST IN CASE; Future  -     Comprehensive metabolic panel; Future    CARDIOVASCULAR SCREENING; LDL GOAL LESS THAN 160  -     Lipid panel reflex to direct LDL Fasting; Future    Screening for diabetes mellitus (DM)  -     JUST IN CASE; Future  -     Comprehensive metabolic panel; Future    Need for Tdap vaccination  -     TDAP, IM (10 - 64 YRS) - Adacel  -     VACCINE ADMINISTRATION, INITIAL    External hemorrhoids  -     COLORECTAL SURGERY REFERRAL  A high fiber diet with plenty of fluids (up to 8 glasses of water daily) is suggested to relieve these symptoms.  Metamucil, 1 tablespoon once or twice daily can be used to keep bowels regular if needed.    Rectal bleeding  -     COLORECTAL SURGERY REFERRAL    PLAN:   1.   Symptomatic therapy suggested:   A high fiber diet with plenty of fluids (up to 8 glasses of water daily) is suggested to relieve these symptoms.  Metamucil, 1 tablespoon or capsules  once  daily can be used to keep bowels regular if needed.  2.  Orders Placed This Encounter   Procedures     TDAP, IM (10 - 64 YRS) - Adacel     VACCINE ADMINISTRATION, INITIAL     Lipid panel reflex to direct LDL Fasting     JUST IN CASE     Comprehensive metabolic panel     3. Patient needs to follow up in if no improvement,or sooner if worsening of symptoms or other symptoms develop.  CONSULTATION/REFERRAL to colorectal specialist   Work on weight loss  Regular exercise  Follow up office visit in one year for annual health maintenance exam, sooner PRN.      COUNSELING:  Reviewed preventive health counseling, as reflected in patient instructions  Special  "attention given to:        Regular exercise       Healthy diet/nutrition       Vision screening       Hearing screening       Immunizations    Vaccinated for: TDAP          Estimated body mass index is 29.81 kg/m  as calculated from the following:    Height as of this encounter: 1.848 m (6' 0.75\").    Weight as of this encounter: 101.8 kg (224 lb 6.4 oz).    Weight management plan: Discussed healthy diet and exercise guidelines     reports that he has quit smoking. His smoking use included cigarettes. He has a 2.50 pack-year smoking history. He quit smokeless tobacco use about 18 years ago.  His smokeless tobacco use included chew.      Counseling Resources:  ATP IV Guidelines  Pooled Cohorts Equation Calculator  FRAX Risk Assessment  ICSI Preventive Guidelines  Dietary Guidelines for Americans, 2010  USDA's MyPlate  ASA Prophylaxis  Lung CA Screening    ESSIE Petersen CNP  [facility]  "

## 2020-02-28 ENCOUNTER — TRANSFERRED RECORDS (OUTPATIENT)
Dept: HEALTH INFORMATION MANAGEMENT | Facility: CLINIC | Age: 35
End: 2020-02-28

## 2020-03-10 ENCOUNTER — HEALTH MAINTENANCE LETTER (OUTPATIENT)
Age: 35
End: 2020-03-10

## 2020-03-16 ENCOUNTER — TRANSFERRED RECORDS (OUTPATIENT)
Dept: HEALTH INFORMATION MANAGEMENT | Facility: CLINIC | Age: 35
End: 2020-03-16

## 2020-05-12 ENCOUNTER — TRANSFERRED RECORDS (OUTPATIENT)
Dept: HEALTH INFORMATION MANAGEMENT | Facility: CLINIC | Age: 35
End: 2020-05-12

## 2020-05-21 DIAGNOSIS — Z11.59 ENCOUNTER FOR SCREENING FOR OTHER VIRAL DISEASES: Primary | ICD-10-CM

## 2020-05-22 ENCOUNTER — OFFICE VISIT (OUTPATIENT)
Dept: PEDIATRICS | Facility: CLINIC | Age: 35
End: 2020-05-22
Payer: COMMERCIAL

## 2020-05-22 VITALS
HEART RATE: 70 BPM | OXYGEN SATURATION: 97 % | TEMPERATURE: 98.2 F | DIASTOLIC BLOOD PRESSURE: 83 MMHG | SYSTOLIC BLOOD PRESSURE: 119 MMHG | BODY MASS INDEX: 29.53 KG/M2 | HEIGHT: 73 IN | WEIGHT: 222.8 LBS

## 2020-05-22 DIAGNOSIS — K60.30 ANAL FISSURE AND FISTULA: ICD-10-CM

## 2020-05-22 DIAGNOSIS — K60.2 ANAL FISSURE AND FISTULA: ICD-10-CM

## 2020-05-22 DIAGNOSIS — Z01.818 PREOP GENERAL PHYSICAL EXAM: Primary | ICD-10-CM

## 2020-05-22 PROCEDURE — 99214 OFFICE O/P EST MOD 30 MIN: CPT | Performed by: INTERNAL MEDICINE

## 2020-05-22 ASSESSMENT — MIFFLIN-ST. JEOR: SCORE: 1985.52

## 2020-05-22 NOTE — PROGRESS NOTES
[address] [address]  [phone]  Dept: [phone]    PRE-OP EVALUATION:  Today's date: 2020    Loco Landeros (: 1985) presents for pre-operative evaluation assessment as requested by Dr. Rosa Serrato.  He requires evaluation and anesthesia risk assessment prior to undergoing surgery/procedure for treatment of  INTRAOPERATIVE COLONOSCOPY, POSSIBLE BIOPYS, POSSIBLE POLYPECTOMY.  POSSIBLE FISTULOTOMY, RECTUM, POSSIBLE PLACEMENT, SETON STITCH      Proposed Surgery/ Procedure: INTRAOPERATIVE COLONOSCOPY, POSSIBLE BIOPYS, POSSIBLE POLYPECTOMY, POSSIBLE FISTULOTOMY, RECTUM, POSSIBLE PLACEMENT, SETON STITCH  Date of Surgery:2020  Time of Surgery/ Procedure: 10:20 AM  Hospital/Surgical Facility: [facility]   Fax number for surgical facility: [phone]  Primary Physician: Vy Argueta  Type of Anesthesia Anticipated: General    Patient has a Health Care Directive or Living Will:  YES     1. NO - Do you have a history of heart attack, stroke, stent, bypass or surgery on an artery in the head, neck, heart or legs?  2. NO - Do you ever have any pain or discomfort in your chest?  3. NO - Do you have a history of  Heart Failure?  4. NO - Are you troubled by shortness of breath when: walking on the level, up a slight hill or at night?  5. NO - Do you currently have a cold, bronchitis or other respiratory infection?  6. NO - Do you have a cough, shortness of breath or wheezing?  7. NO - Do you sometimes get pains in the calves of your legs when you walk?  8. NO - Do you or anyone in your family have previous history of blood clots?  9. NO - Do you or does anyone in your family have a serious bleeding problem such as prolonged bleeding following surgeries or cuts?  10. NO - Have you ever had problems with anemia or been told to take iron pills?  11. NO - Have you had any abnormal blood loss such as black, tarry or bloody  stools, or abnormal vaginal bleeding?  12. NO - Have you ever had a blood transfusion?  13. NO - Have you or any of your relatives ever had problems with anesthesia?  14. NO - Do you have sleep apnea, excessive snoring or daytime drowsiness?  15. NO - Do you have any prosthetic heart valves?  16. NO - Do you have prosthetic joints?  17. NO - Is there any chance that you may be pregnant?      HPI:     HPI related to upcoming procedure:     35-year-old gentleman presents for preop medical examination.  He is to undergo surgery for anal fissure and fistula.  He has had all long history of this issue and in the past 5 months more painful fistulous.  He has no prior health issues.  Denies chest pain, shortness of breath dizziness or lightheadedness.  Does not take any regular medication.  No prior history of surgery.        MEDICAL HISTORY:     Patient Active Problem List    Diagnosis Date Noted     Acne vulgaris 04/19/2019     Priority: Medium     Nicotine use disorder 04/29/2015     Priority: Medium     Degeneration of lumbar or lumbosacral intervertebral disc 05/23/2013     Priority: Medium     Family history of malignant neoplasm of prostate 01/23/2009     Priority: Medium     Overview:   Begin PSA's age 43        Past Medical History:   Diagnosis Date     NO ACTIVE PROBLEMS      Past Surgical History:   Procedure Laterality Date     COLONOSCOPY WITH CO2 INSUFFLATION N/A 10/2/2017    Procedure: COLONOSCOPY WITH CO2 INSUFFLATION;  Colonoscopy, Vy Argueta, Rectal bleeding, BMI 29.74, [facility] [place] [phone]. ;  Surgeon: Justa Pickard MD;  Location:  OR     NO HISTORY OF SURGERY       Current Outpatient Medications   Medication Sig Dispense Refill     hydrocortisone 2.5 % cream Apply three times weekly for 1 month after shaving. Wait at least 10 minutes after shaving. 30 g 1     Inulin (FIBER CHOICE PO)        Multiple Vitamins-Minerals (MULTIVITAMIN ADULT) CHEW Take 2 chew tab by mouth daily        "ibuprofen (ADVIL/MOTRIN) 200 MG tablet Take 200 mg by mouth every 4 hours as needed for mild pain       OTC products: None, except as noted above    No Known Allergies   Latex Allergy: NO    Social History     Tobacco Use     Smoking status: Former Smoker     Packs/day: 0.50     Years: 5.00     Pack years: 2.50     Types: Cigarettes     Smokeless tobacco: Former User     Types: Chew     Quit date: 1/1/2002     Tobacco comment: socially smoker   Substance Use Topics     Alcohol use: Yes     Comment: once a week      History   Drug Use No       REVIEW OF SYSTEMS:   Constitutional, neuro, ENT, endocrine, pulmonary, cardiac, gastrointestinal, genitourinary, musculoskeletal, integument and psychiatric systems are negative, except as otherwise noted.    EXAM:   /83 (BP Location: Right arm, Patient Position: Sitting, Cuff Size: Adult Large)   Pulse 70   Temp 98.2  F (36.8  C) (Temporal)   Ht 1.848 m (6' 0.75\")   Wt 101.1 kg (222 lb 12.8 oz)   SpO2 97%   BMI 29.60 kg/m      GENERAL APPEARANCE: healthy, alert and no distress     EYES: EOMI,  PERRL     HENT: ear canals and TM's normal and nose and mouth without ulcers or lesions     NECK: no adenopathy, no asymmetry, masses, or scars and thyroid normal to palpation     RESP: lungs clear to auscultation - no rales, rhonchi or wheezes     CV: regular rates and rhythm, normal S1 S2, no S3 or S4 and no murmur, click or rub     ABDOMEN:  soft, nontender, no HSM or masses and bowel sounds normal     MS: extremities normal- no gross deformities noted, no evidence of inflammation in joints, FROM in all extremities.     SKIN: no suspicious lesions or rashes     NEURO: Normal strength and tone, sensory exam grossly normal, mentation intact and speech normal     PSYCH: mentation appears normal. and affect normal/bright     LYMPHATICS: No cervical adenopathy    DIAGNOSTICS:   No labs or EKG required for low risk surgery (cataract, skin procedure, breast biopsy, etc)    Recent " Labs   Lab Test 02/25/20  0859 05/16/19  0841 04/19/19  0735  12/07/18  0820   HGB  --  15.1 14.9   < >  --    PLT  --  264 265   < >  --      --   --   --  141   POTASSIUM 4.2  --   --   --  4.3   CR 1.01  --   --   --  0.96    < > = values in this interval not displayed.        IMPRESSION:   Diagnosis/reason for consult:     1.  Preop general physical examination completed and is good.  2.  Anal fissure and fistula.  Patient to undergo surgery for that reason.      The proposed surgical procedure is considered LOW risk.    REVISED CARDIAC RISK INDEX  The patient has the following serious cardiovascular risks for perioperative complications such as (MI, PE, VFib and 3  AV Block):  No serious cardiac risks  INTERPRETATION: 0 risks: Class I (very low risk - 0.4% complication rate)    The patient has the following additional risks for perioperative complications:  No identified additional risks      ICD-10-CM    1. Preop general physical exam  Z01.818    2. Anal fissure and fistula  K60.2     K60.3        RECOMMENDATIONS:     Patient is medically optimized to undergo the planned surgical procedure.    --Patient is to take all scheduled medications on the day of surgery EXCEPT for modifications listed below.    APPROVAL GIVEN to proceed with proposed procedure, without further diagnostic evaluation       Signed Electronically by: Tom Hood MD    Copy of this evaluation report is provided to requesting physician.    [place] Preop Guidelines    Revised Cardiac Risk Index

## 2020-06-03 DIAGNOSIS — Z11.59 ENCOUNTER FOR SCREENING FOR OTHER VIRAL DISEASES: ICD-10-CM

## 2020-06-03 PROCEDURE — 99000 SPECIMEN HANDLING OFFICE-LAB: CPT | Performed by: COLON & RECTAL SURGERY

## 2020-06-03 PROCEDURE — U0003 INFECTIOUS AGENT DETECTION BY NUCLEIC ACID (DNA OR RNA); SEVERE ACUTE RESPIRATORY SYNDROME CORONAVIRUS 2 (SARS-COV-2) (CORONAVIRUS DISEASE [COVID-19]), AMPLIFIED PROBE TECHNIQUE, MAKING USE OF HIGH THROUGHPUT TECHNOLOGIES AS DESCRIBED BY CMS-2020-01-R: HCPCS | Mod: 90 | Performed by: COLON & RECTAL SURGERY

## 2020-06-04 LAB
SARS-COV-2 RNA SPEC QL NAA+PROBE: NOT DETECTED
SPECIMEN SOURCE: NORMAL

## 2020-06-05 ENCOUNTER — HOSPITAL ENCOUNTER (OUTPATIENT)
Facility: CLINIC | Age: 35
Discharge: HOME | End: 2020-06-05
Attending: COLON & RECTAL SURGERY | Admitting: COLON & RECTAL SURGERY
Payer: COMMERCIAL

## 2020-06-05 ENCOUNTER — ANESTHESIA (OUTPATIENT)
Dept: SURGERY | Facility: CLINIC | Age: 35
End: 2020-06-05
Payer: COMMERCIAL

## 2020-06-05 ENCOUNTER — ANESTHESIA EVENT (OUTPATIENT)
Dept: SURGERY | Facility: CLINIC | Age: 35
End: 2020-06-05
Payer: COMMERCIAL

## 2020-06-05 VITALS
RESPIRATION RATE: 10 BRPM | OXYGEN SATURATION: 98 % | DIASTOLIC BLOOD PRESSURE: 95 MMHG | TEMPERATURE: 98.1 F | HEIGHT: 73 IN | HEART RATE: 55 BPM | SYSTOLIC BLOOD PRESSURE: 133 MMHG | BODY MASS INDEX: 29.1 KG/M2 | WEIGHT: 219.61 LBS

## 2020-06-05 DIAGNOSIS — K60.30 ANAL FISTULA: Primary | ICD-10-CM

## 2020-06-05 LAB — COLONOSCOPY: NORMAL

## 2020-06-05 PROCEDURE — 27210794 ZZH OR GENERAL SUPPLY STERILE: Performed by: COLON & RECTAL SURGERY

## 2020-06-05 PROCEDURE — 25000128 H RX IP 250 OP 636: Performed by: NURSE ANESTHETIST, CERTIFIED REGISTERED

## 2020-06-05 PROCEDURE — 25000125 ZZHC RX 250: Performed by: COLON & RECTAL SURGERY

## 2020-06-05 PROCEDURE — 40000170 ZZH STATISTIC PRE-PROCEDURE ASSESSMENT II: Performed by: COLON & RECTAL SURGERY

## 2020-06-05 PROCEDURE — 25000125 ZZHC RX 250: Performed by: NURSE ANESTHETIST, CERTIFIED REGISTERED

## 2020-06-05 PROCEDURE — 88305 TISSUE EXAM BY PATHOLOGIST: CPT | Mod: 26,76 | Performed by: COLON & RECTAL SURGERY

## 2020-06-05 PROCEDURE — 37000008 ZZH ANESTHESIA TECHNICAL FEE, 1ST 30 MIN: Performed by: COLON & RECTAL SURGERY

## 2020-06-05 PROCEDURE — 88305 TISSUE EXAM BY PATHOLOGIST: CPT | Performed by: COLON & RECTAL SURGERY

## 2020-06-05 PROCEDURE — 71000027 ZZH RECOVERY PHASE 2 EACH 15 MINS: Performed by: COLON & RECTAL SURGERY

## 2020-06-05 PROCEDURE — 36000052 ZZH SURGERY LEVEL 2 EA 15 ADDTL MIN: Performed by: COLON & RECTAL SURGERY

## 2020-06-05 PROCEDURE — 36000050 ZZH SURGERY LEVEL 2 1ST 30 MIN: Performed by: COLON & RECTAL SURGERY

## 2020-06-05 PROCEDURE — 37000009 ZZH ANESTHESIA TECHNICAL FEE, EACH ADDTL 15 MIN: Performed by: COLON & RECTAL SURGERY

## 2020-06-05 PROCEDURE — 25000566 ZZH SEVOFLURANE, EA 15 MIN: Performed by: COLON & RECTAL SURGERY

## 2020-06-05 PROCEDURE — 25800030 ZZH RX IP 258 OP 636: Performed by: NURSE ANESTHETIST, CERTIFIED REGISTERED

## 2020-06-05 PROCEDURE — 71000014 ZZH RECOVERY PHASE 1 LEVEL 2 FIRST HR: Performed by: COLON & RECTAL SURGERY

## 2020-06-05 RX ORDER — OXYCODONE HYDROCHLORIDE 5 MG/1
5-10 TABLET ORAL
Status: DISCONTINUED | OUTPATIENT
Start: 2020-06-05 | End: 2020-06-05 | Stop reason: HOSPADM

## 2020-06-05 RX ORDER — SODIUM CHLORIDE, SODIUM LACTATE, POTASSIUM CHLORIDE, CALCIUM CHLORIDE 600; 310; 30; 20 MG/100ML; MG/100ML; MG/100ML; MG/100ML
INJECTION, SOLUTION INTRAVENOUS CONTINUOUS PRN
Status: DISCONTINUED | OUTPATIENT
Start: 2020-06-05 | End: 2020-06-05

## 2020-06-05 RX ORDER — SODIUM CHLORIDE, SODIUM LACTATE, POTASSIUM CHLORIDE, CALCIUM CHLORIDE 600; 310; 30; 20 MG/100ML; MG/100ML; MG/100ML; MG/100ML
INJECTION, SOLUTION INTRAVENOUS CONTINUOUS
Status: DISCONTINUED | OUTPATIENT
Start: 2020-06-05 | End: 2020-06-05 | Stop reason: HOSPADM

## 2020-06-05 RX ORDER — NALOXONE HYDROCHLORIDE 0.4 MG/ML
.1-.4 INJECTION, SOLUTION INTRAMUSCULAR; INTRAVENOUS; SUBCUTANEOUS
Status: DISCONTINUED | OUTPATIENT
Start: 2020-06-05 | End: 2020-06-05 | Stop reason: HOSPADM

## 2020-06-05 RX ORDER — LIDOCAINE HYDROCHLORIDE 20 MG/ML
JELLY TOPICAL PRN
Qty: 30 ML | Refills: 1 | Status: SHIPPED | OUTPATIENT
Start: 2020-06-05 | End: 2021-04-01

## 2020-06-05 RX ORDER — VECURONIUM BROMIDE 1 MG/ML
INJECTION, POWDER, LYOPHILIZED, FOR SOLUTION INTRAVENOUS PRN
Status: DISCONTINUED | OUTPATIENT
Start: 2020-06-05 | End: 2020-06-05

## 2020-06-05 RX ORDER — MEPERIDINE HYDROCHLORIDE 25 MG/ML
12.5 INJECTION INTRAMUSCULAR; INTRAVENOUS; SUBCUTANEOUS
Status: DISCONTINUED | OUTPATIENT
Start: 2020-06-05 | End: 2020-06-05 | Stop reason: HOSPADM

## 2020-06-05 RX ORDER — ONDANSETRON 4 MG/1
4 TABLET, ORALLY DISINTEGRATING ORAL EVERY 30 MIN PRN
Status: DISCONTINUED | OUTPATIENT
Start: 2020-06-05 | End: 2020-06-05 | Stop reason: HOSPADM

## 2020-06-05 RX ORDER — PROPOFOL 10 MG/ML
INJECTION, EMULSION INTRAVENOUS PRN
Status: DISCONTINUED | OUTPATIENT
Start: 2020-06-05 | End: 2020-06-05

## 2020-06-05 RX ORDER — GLYCOPYRROLATE 0.2 MG/ML
INJECTION, SOLUTION INTRAMUSCULAR; INTRAVENOUS PRN
Status: DISCONTINUED | OUTPATIENT
Start: 2020-06-05 | End: 2020-06-05

## 2020-06-05 RX ORDER — NEOSTIGMINE METHYLSULFATE 1 MG/ML
VIAL (ML) INJECTION PRN
Status: DISCONTINUED | OUTPATIENT
Start: 2020-06-05 | End: 2020-06-05

## 2020-06-05 RX ORDER — FENTANYL CITRATE 50 UG/ML
25-50 INJECTION, SOLUTION INTRAMUSCULAR; INTRAVENOUS
Status: DISCONTINUED | OUTPATIENT
Start: 2020-06-05 | End: 2020-06-05 | Stop reason: HOSPADM

## 2020-06-05 RX ORDER — OXYCODONE HYDROCHLORIDE 5 MG/1
5-10 TABLET ORAL EVERY 6 HOURS PRN
Qty: 10 TABLET | Refills: 0 | Status: SHIPPED | OUTPATIENT
Start: 2020-06-05 | End: 2021-04-01

## 2020-06-05 RX ORDER — HYDROMORPHONE HYDROCHLORIDE 1 MG/ML
.3-.5 INJECTION, SOLUTION INTRAMUSCULAR; INTRAVENOUS; SUBCUTANEOUS EVERY 10 MIN PRN
Status: DISCONTINUED | OUTPATIENT
Start: 2020-06-05 | End: 2020-06-05 | Stop reason: HOSPADM

## 2020-06-05 RX ORDER — ONDANSETRON 2 MG/ML
4 INJECTION INTRAMUSCULAR; INTRAVENOUS EVERY 30 MIN PRN
Status: DISCONTINUED | OUTPATIENT
Start: 2020-06-05 | End: 2020-06-05 | Stop reason: HOSPADM

## 2020-06-05 RX ORDER — BUPIVACAINE HYDROCHLORIDE 5 MG/ML
INJECTION, SOLUTION PERINEURAL PRN
Status: DISCONTINUED | OUTPATIENT
Start: 2020-06-05 | End: 2020-06-05 | Stop reason: HOSPADM

## 2020-06-05 RX ORDER — MAGNESIUM HYDROXIDE 1200 MG/15ML
LIQUID ORAL PRN
Status: DISCONTINUED | OUTPATIENT
Start: 2020-06-05 | End: 2020-06-05 | Stop reason: HOSPADM

## 2020-06-05 RX ORDER — FENTANYL CITRATE 50 UG/ML
INJECTION, SOLUTION INTRAMUSCULAR; INTRAVENOUS PRN
Status: DISCONTINUED | OUTPATIENT
Start: 2020-06-05 | End: 2020-06-05

## 2020-06-05 RX ORDER — EPHEDRINE SULFATE 50 MG/ML
INJECTION, SOLUTION INTRAMUSCULAR; INTRAVENOUS; SUBCUTANEOUS PRN
Status: DISCONTINUED | OUTPATIENT
Start: 2020-06-05 | End: 2020-06-05

## 2020-06-05 RX ORDER — ACETAMINOPHEN 325 MG/1
975 TABLET ORAL ONCE
Status: DISCONTINUED | OUTPATIENT
Start: 2020-06-05 | End: 2020-06-05 | Stop reason: HOSPADM

## 2020-06-05 RX ORDER — PROPOFOL 10 MG/ML
INJECTION, EMULSION INTRAVENOUS CONTINUOUS PRN
Status: DISCONTINUED | OUTPATIENT
Start: 2020-06-05 | End: 2020-06-05

## 2020-06-05 RX ORDER — DEXAMETHASONE SODIUM PHOSPHATE 4 MG/ML
INJECTION, SOLUTION INTRA-ARTICULAR; INTRALESIONAL; INTRAMUSCULAR; INTRAVENOUS; SOFT TISSUE PRN
Status: DISCONTINUED | OUTPATIENT
Start: 2020-06-05 | End: 2020-06-05

## 2020-06-05 RX ORDER — ONDANSETRON 2 MG/ML
INJECTION INTRAMUSCULAR; INTRAVENOUS PRN
Status: DISCONTINUED | OUTPATIENT
Start: 2020-06-05 | End: 2020-06-05

## 2020-06-05 RX ADMIN — Medication 5 MG: at 16:30

## 2020-06-05 RX ADMIN — FENTANYL CITRATE 50 MCG: 50 INJECTION, SOLUTION INTRAMUSCULAR; INTRAVENOUS at 17:19

## 2020-06-05 RX ADMIN — VECURONIUM BROMIDE 2 MG: 1 INJECTION, POWDER, LYOPHILIZED, FOR SOLUTION INTRAVENOUS at 17:19

## 2020-06-05 RX ADMIN — ROCURONIUM BROMIDE 50 MG: 10 INJECTION INTRAVENOUS at 16:03

## 2020-06-05 RX ADMIN — Medication 5 MG: at 16:48

## 2020-06-05 RX ADMIN — VECURONIUM BROMIDE 2 MG: 1 INJECTION, POWDER, LYOPHILIZED, FOR SOLUTION INTRAVENOUS at 16:30

## 2020-06-05 RX ADMIN — PROPOFOL 250 MG: 10 INJECTION, EMULSION INTRAVENOUS at 16:03

## 2020-06-05 RX ADMIN — SODIUM CHLORIDE, POTASSIUM CHLORIDE, SODIUM LACTATE AND CALCIUM CHLORIDE: 600; 310; 30; 20 INJECTION, SOLUTION INTRAVENOUS at 15:56

## 2020-06-05 RX ADMIN — NEOSTIGMINE METHYLSULFATE 5 MG: 1 INJECTION, SOLUTION INTRAVENOUS at 17:48

## 2020-06-05 RX ADMIN — PHENYLEPHRINE HYDROCHLORIDE 100 MCG: 10 INJECTION INTRAVENOUS at 16:41

## 2020-06-05 RX ADMIN — FENTANYL CITRATE 100 MCG: 50 INJECTION, SOLUTION INTRAMUSCULAR; INTRAVENOUS at 16:03

## 2020-06-05 RX ADMIN — PHENYLEPHRINE HYDROCHLORIDE 50 MCG: 10 INJECTION INTRAVENOUS at 16:24

## 2020-06-05 RX ADMIN — DEXAMETHASONE SODIUM PHOSPHATE 4 MG: 4 INJECTION, SOLUTION INTRA-ARTICULAR; INTRALESIONAL; INTRAMUSCULAR; INTRAVENOUS; SOFT TISSUE at 16:15

## 2020-06-05 RX ADMIN — FENTANYL CITRATE 50 MCG: 50 INJECTION, SOLUTION INTRAMUSCULAR; INTRAVENOUS at 17:28

## 2020-06-05 RX ADMIN — MIDAZOLAM 2 MG: 1 INJECTION INTRAMUSCULAR; INTRAVENOUS at 15:57

## 2020-06-05 RX ADMIN — GLYCOPYRROLATE 0.8 MG: 0.2 INJECTION, SOLUTION INTRAMUSCULAR; INTRAVENOUS at 17:48

## 2020-06-05 RX ADMIN — Medication 5 MG: at 16:33

## 2020-06-05 RX ADMIN — PROPOFOL 150 MCG/KG/MIN: 10 INJECTION, EMULSION INTRAVENOUS at 16:30

## 2020-06-05 RX ADMIN — PHENYLEPHRINE HYDROCHLORIDE 100 MCG: 10 INJECTION INTRAVENOUS at 16:56

## 2020-06-05 RX ADMIN — ONDANSETRON 4 MG: 2 INJECTION INTRAMUSCULAR; INTRAVENOUS at 17:30

## 2020-06-05 RX ADMIN — PHENYLEPHRINE HYDROCHLORIDE 50 MCG: 10 INJECTION INTRAVENOUS at 16:15

## 2020-06-05 RX ADMIN — SODIUM CHLORIDE, POTASSIUM CHLORIDE, SODIUM LACTATE AND CALCIUM CHLORIDE: 600; 310; 30; 20 INJECTION, SOLUTION INTRAVENOUS at 17:39

## 2020-06-05 RX ADMIN — VECURONIUM BROMIDE 2 MG: 1 INJECTION, POWDER, LYOPHILIZED, FOR SOLUTION INTRAVENOUS at 17:22

## 2020-06-05 ASSESSMENT — LIFESTYLE VARIABLES: TOBACCO_USE: 0

## 2020-06-05 ASSESSMENT — ENCOUNTER SYMPTOMS: SEIZURES: 0

## 2020-06-05 ASSESSMENT — MIFFLIN-ST. JEOR: SCORE: 1975.02

## 2020-06-05 NOTE — DISCHARGE INSTRUCTIONS
Same Day Surgery Discharge Instructions for  Sedation and General Anesthesia       It's not unusual to feel dizzy, light-headed or faint for up to 24 hours after surgery or while taking pain medication.  If you have these symptoms: sit for a few minutes before standing and have someone assist you when you get up to walk or use the bathroom.      You should rest and relax for the next 24 hours. We recommend you make arrangements to have an adult stay with you for at least 24 hours after your discharge.  Avoid hazardous and strenuous activity.      DO NOT DRIVE any vehicle or operate mechanical equipment for 24 hours following the end of your surgery.  Even though you may feel normal, your reactions may be affected by the medication you have received.      Do not drink alcoholic beverages for 24 hours following surgery.       Slowly progress to your regular diet as you feel able. It's not unusual to feel nauseated and/or vomit after receiving anesthesia.  If you develop these symptoms, drink clear liquids (apple juice, ginger ale, broth, 7-up, etc. ) until you feel better.  If your nausea and vomiting persists for 24 hours, please notify your surgeon.        All narcotic pain medications, along with inactivity and anesthesia, can cause constipation. Drinking plenty of liquids and increasing fiber intake will help.      For any questions of a medical nature, call your surgeon.      Do not make important decisions for 24 hours.      If you had general anesthesia, you may have a sore throat for a couple of days related to the breathing tube used during surgery.  You may use Cepacol lozenges to help with this discomfort.  If it worsens or if you develop a fever, contact your surgeon.       If you feel your pain is not well managed with the pain medications prescribed by your surgeon, please contact your surgeon's office to let them know so they can address your concerns.       CoVid 19 Information    We want to give you  information regarding Covid. Please consult your primary care provider with any questions you might have.     Patient who have symptoms (cough, fever, or shortness of breath), need to isolate for 7 days from when symptoms started OR 72 hours after fever resolves (without fever reducing medications) AND improvement of respiratory symptoms (whichever is longer).      Isolate yourself at home (in own room/own bathroom if possible)    Do Not allow any visitors    Do Not go to work or school    Do Not go to Scientologist,  centers, shopping, or other public places.    Do Not shake hands.    Avoid close and intimate contact with others (hugging, kissing).    Follow CDC recommendations for household cleaning of frequently touched services.     After the initial 7 days, continue to isolate yourself from household members as much as possible. To continue decrease the risk of community spread and exposure, you and any members of your household should limit activities in public for 14 days after starting home isolation.     You can reference the following CDC link for helpful home isolation/care tips:  https://www.cdc.gov/coronavirus/2019-ncov/downloads/10Things.pdf    Protect Others:    Cover Your Mouth and Nose with a mask, disposable tissue or wash cloth to avoid spreading germs to others.    Wash your hands and face frequently with soap and water    Call Your Primary Doctor If: Breathing difficulty develops or you become worse.    For more information about COVID19 and options for caring for yourself at home, please visit the CDC website at https://www.cdc.gov/coronavirus/2019-ncov/about/steps-when-sick.html  For more options for care at [facility], please visit our website at [email or web address]

## 2020-06-05 NOTE — BRIEF OP NOTE
[facility]    Brief Operative Note    Pre-operative diagnosis: Anal fistula [K60.3]  Post-operative diagnosis Same as pre-operative diagnosis, rule out Crohn's disease    Procedure: Procedure(s):  INTRAOPERATIVE COLONOSCOPY WITH BIOPSIES  EXAM UNDER ANESTHESIA, RECTUM  FISTULOTOMY, RECTUM,  PLACEMENT, SETON STITCH  Surgeon: Surgeon(s) and Role:     * Rosa Serrato MD - Primary  Anesthesia: General   Estimated blood loss: Minimal  Drains: None  Specimens:   ID Type Source Tests Collected by Time Destination   A : Ileocecal valve biopsy Biopsy Large Intestine, Cecum SURGICAL PATHOLOGY EXAM Rosa Serrato MD 6/5/2020  4:36 PM    B : Ascending Colon inflammation biopsies Biopsy Large Intestine, Right/Ascending SURGICAL PATHOLOGY EXAM Rosa Serrato MD 6/5/2020  4:39 PM    C : Descending colon biopsies and ulcer Biopsy Large Intestine, Left/Descending SURGICAL PATHOLOGY EXAM Rosa Serrato MD 6/5/2020  4:47 PM    D : Sigmoid Colon Inflammation biopsies Biopsy Large Intestine, Sigmoid SURGICAL PATHOLOGY EXAM Rosa Serrato MD 6/5/2020  4:54 PM    E : Rectal Biopsies Biopsy Large Intestine, Rectum SURGICAL PATHOLOGY EXAM Rosa Serrato MD 6/5/2020  4:58 PM    F : External Fistula opening Biopsy Anus SURGICAL PATHOLOGY EXAM Rosa Serrato MD 6/5/2020  5:26 PM    G : Anterior Internal Opening Biopsy Anus SURGICAL PATHOLOGY EXAM Rosa Serrato MD 6/5/2020  5:28 PM      Findings:   stenosis with 5 mm opening at the ileocecal valve, the valve was ulcerated, and inflammed, this was biopsied.  Mild proximal ascending colon inflammation biopsied, scattered inflammation and apthous ulcers in the descending colon, sigmoid colon and rectum, biopsied, mild distal rectal mucosal inflammation biopsied. Left anterior intersphincteric fistula - seton placed..  Complications: None.  Implants: * No implants in log *

## 2020-06-05 NOTE — ANESTHESIA POSTPROCEDURE EVALUATION
Patient: Loco Landeros    Procedure(s):  INTRAOPERATIVE COLONOSCOPY WITH BIOPSIES  EXAM UNDER ANESTHESIA, RECTUM  FISTULOTOMY, RECTUM,  PLACEMENT, SETON STITCH    Diagnosis:Anal fistula [K60.3]  Diagnosis Additional Information: No value filed.    Anesthesia Type:  General    Note:  Anesthesia Post Evaluation    Patient location during evaluation: PACU  Patient participation: Able to fully participate in evaluation  Level of consciousness: awake and alert  Pain management: adequate  Airway patency: patent  Cardiovascular status: acceptable and hemodynamically stable  Respiratory status: nonlabored ventilation, unassisted and acceptable  Hydration status: acceptable  PONV: none             Last vitals:  Vitals:    06/05/20 1446 06/05/20 1801   BP: 131/82    Pulse: 58    Resp: 16    Temp: 36.4  C (97.5  F) 35.8  C (96.4  F)   SpO2: 98%          Electronically Signed By: Chintan Ordoñez MD  June 5, 2020  6:32 PM

## 2020-06-05 NOTE — ANESTHESIA PREPROCEDURE EVALUATION
Anesthesia Pre-Procedure Evaluation    Patient: Loco Landeros   MRN: [id number] : 1985          Preoperative Diagnosis: Anal fistula [K60.3]    Procedure(s):  INTRAOPERATIVE COLONOSCOPY, POSSIBLE BIOPYS, POSSIBLE POLYPECTOMY  EXAM UNDER ANESTHESIA, RECTUM  POSSIBLE FISTULOTOMY, RECTUM,  POSSIBLE PLACEMENT, SETON STITCH    Past Medical History:   Diagnosis Date     NO ACTIVE PROBLEMS      Past Surgical History:   Procedure Laterality Date     COLONOSCOPY WITH CO2 INSUFFLATION N/A 10/2/2017    Procedure: COLONOSCOPY WITH CO2 INSUFFLATION;  Colonoscopy, Vy Argueta, Rectal bleeding, BMI 29.74, [facility] [place] [phone]. ;  Surgeon: Justa Pickard MD;  Location: [facility] OR     NO HISTORY OF SURGERY         Anesthesia Evaluation     . Pt has not had prior anesthetic            ROS/MED HX    ENT/Pulmonary:      (-) tobacco use and sleep apnea   Neurologic:      (-) seizures   Cardiovascular:        (-) RICHARDSON   METS/Exercise Tolerance:  >4 METS   Hematologic:         Musculoskeletal:         GI/Hepatic:        (-) GERD and liver disease   Renal/Genitourinary:      (-) renal disease   Endo:      (-) Type II DM and thyroid disease   Psychiatric:         Infectious Disease:         Malignancy:         Other:                          Physical Exam  Normal systems: cardiovascular, pulmonary and dental    Airway   Mallampati: II  TM distance: >3 FB  Neck ROM: full    Dental     Cardiovascular       Pulmonary             Lab Results   Component Value Date    WBC 8.0 2019    HGB 15.1 2019    HCT 46.1 2019     2019     2020    POTASSIUM 4.2 2020    CHLORIDE 108 2020    CO2 26 2020    BUN 19 2020    CR 1.01 2020    GLC 87 2020    CHEYENNE 8.8 2020    ALBUMIN 4.0 2020    PROTTOTAL 7.6 2020    ALT 33 2020    AST 22 2020    ALKPHOS 55 2020    BILITOTAL 0.6 2020    TSH 1.71 2018       Preop  "Vitals  BP Readings from Last 3 Encounters:   06/05/20 131/82   05/22/20 119/83   02/25/20 100/70    Pulse Readings from Last 3 Encounters:   06/05/20 58   05/22/20 70   02/25/20 64      Resp Readings from Last 3 Encounters:   06/05/20 16   10/02/17 16    SpO2 Readings from Last 3 Encounters:   06/05/20 98%   05/22/20 97%   02/25/20 95%      Temp Readings from Last 1 Encounters:   06/05/20 36.4  C (97.5  F) (Oral)    Ht Readings from Last 1 Encounters:   06/05/20 1.854 m (6' 1\")      Wt Readings from Last 1 Encounters:   06/05/20 99.6 kg (219 lb 9.8 oz)    Estimated body mass index is 28.97 kg/m  as calculated from the following:    Height as of this encounter: 1.854 m (6' 1\").    Weight as of this encounter: 99.6 kg (219 lb 9.8 oz).       Anesthesia Plan      History & Physical Review  History and physical reviewed and following examination; no interval change.    ASA Status:  1 .    NPO Status:  > 8 hours    Plan for General with Intravenous induction. Maintenance will be Balanced.    PONV prophylaxis:  Ondansetron (or other 5HT-3) and Dexamethasone or Solumedrol         Postoperative Care  Postoperative pain management:  Multi-modal analgesia.      Consents  Anesthetic plan, risks, benefits and alternatives discussed with:  Patient..                 Yon Coker MD  "

## 2020-06-05 NOTE — ANESTHESIA CARE TRANSFER NOTE
Patient: Loco Landeros    Procedure(s):  INTRAOPERATIVE COLONOSCOPY WITH BIOPSIES  EXAM UNDER ANESTHESIA, RECTUM  FISTULOTOMY, RECTUM,  PLACEMENT, SETON STITCH    Diagnosis: Anal fistula [K60.3]  Diagnosis Additional Information: No value filed.    Anesthesia Type:   General     Note:  Airway :Face Mask  Patient transferred to:PACU  Comments: Patient breathing spontaneously.  Follows commands.  Suctioned and extubated.  Exchanging air well.  Transferred to PACU with 10L O2 via mask.  Monitors on.  VSS.  Patent IV.  Report and transfer of care to RN.  Handoff Report: Identifed the Patient, Identified the Reponsible Provider, Reviewed the pertinent medical history, Discussed the surgical course, Reviewed Intra-OP anesthesia mangement and issues during anesthesia, Set expectations for post-procedure period and Allowed opportunity for questions and acknowledgement of understanding      Vitals: (Last set prior to Anesthesia Care Transfer)    CRNA VITALS  6/5/2020 1728 - 6/5/2020 1805      6/5/2020             Pulse:  72    SpO2:  100 %    Resp Rate (observed):  15    Resp Rate (set):  10                Electronically Signed By: ESSIE Shaw CRNA  June 5, 2020  6:05 PM

## 2020-06-06 NOTE — OP NOTE
Procedure Date: 06/05/2020      PREOPERATIVE DIAGNOSIS:  Anal fistula.      POSTOPERATIVE DIAGNOSES:   1.  Anal fistula.   2.  Rule out Crohn's disease.      PROCEDURES PERFORMED:   1.  Intraoperative colonoscopy with biopsies.   2.  Exam under anesthesia.   3.  Placement of seton.      SURGEON:  Rosa Serrato MD      ANESTHESIA:  General.      INDICATIONS:  Loco Landeros is a 35-year-old male who has been seen in our clinic numerous times for perianal pain and found to have left anterior fistula and possible fissure.  He was treated with diltiazem cream but had persistent opening in the left anterior position.  He had also complained of some abdominal pain and cramping and has a family history of Crohn's disease and so was concerned about the possibility of Crohn's disease.  We discussed going to the operating room for a colonoscopy to evaluate for Crohn's disease or other pathology and also a seton versus fistulotomy depending on the intraoperative findings  We discussed the risks of procedure, including bleeding, infection, damage to the colon, changes to his continence, and he understands these risks and would like to proceed.      OPERATIVE FINDINGS:  On the colonoscopy, there was a stricture at the ileocecal valve with a diameter of only 5 mm.  I was unable to pass the scope into the terminal ileum.  There were ulcerations and nodularity and inflammation at the ileocecal valve.  This was biopsied.  There was mild inflammation in the ascending colon just distal to the ileocecal valve, and this was biopsied.  There were a few, scattered inflammation and aphthous ulcers in the descending colon, sigmoid colon and rectum.  This was biopsied as well.  There is mild rectal inflammation that was biopsied.  There was a left anterior intersphincteric fistula with an ulcerated opening in the anal canal.  Both internal and external openings were biopsied.  A seton was placed.      DESCRIPTION OF PROCEDURE:  After  informed consent was obtained, the patient was brought to the operating room, where general anesthesia was induced without difficulty.  He was flipped into the left lateral position.  A colonoscopy was performed.  Please see the ProVation report for full details regarding this part of the procedure.      He was then flipped into a well-padded prone jackknife position with the buttocks taped apart.  Perianal region was sterilely prepped and draped in the usual fashion.  A Nichols bivalve retractor was inserted into the anal canal, and the operative findings are noted above.      There was some mild distal rectal mucosal inflammation.  This had been biopsied previously during the colonoscopy.  There was an ulcerated internal opening in the anterior midline.  This connected to the external opening in the left anterior position.  The gently curved fistula probe was inserted into the external opening in the left anterior position and tracked easily into the internal opening in the anterior midline.  Palpation of the tissue on top of the probe revealed that this was more of a superficial tract involving skin and subcutaneous tissue in the internal sphincter muscle.  The external fistula opening of the fistula was made slightly larger using Bovie electrocautery, and part of this was passed off the field to be sent to Pathology.  A biopsy of the internal opening was also performed, and this was passed off the field to be sent to Pathology.  Given that there was possibility of a Crohn's diagnosis and he has mild right distal rectal inflammation, I elected to not perform a fistulotomy at this time and placed a seton.  An 0 silk suture was tied to the gently curved fistula probe and pulled through the fistula tract.  This 0 silk suture was then secured to a red vessel loop, which was pulled through the tract.  The red vessel loop was secured to itself using 0 silk as a seton.  The seton was loose and easily spun through the  tract.      A solution of 0.5% Marcaine totaling 30 mL was instilled at the base of the fistula and perianal region for prolonged postoperative analgesia.  Sterile gauze dressings were applied.      The patient tolerated the procedure well without complications.  Estimated blood loss was 2 mL.  I was present and scrubbed for the entire duration of the operation.  The patient was returned to the supine position, extubated in the operating room, and brought to the recovery room in stable condition.         HARPREET SOLIZ MD             D: 2020   T: 2020   MT: FRANKY      Name:     ANJELICA SMILEY   MRN:      [id number]        Account:        [id number]   :      1985           Procedure Date: 2020      Document: [id number]

## 2020-06-09 LAB — COPATH REPORT: NORMAL

## 2020-06-23 ENCOUNTER — HOSPITAL ENCOUNTER (OUTPATIENT)
Dept: MRI IMAGING | Facility: CLINIC | Age: 35
Discharge: HOME | End: 2020-06-23
Attending: COLON & RECTAL SURGERY | Admitting: COLON & RECTAL SURGERY
Payer: COMMERCIAL

## 2020-06-23 DIAGNOSIS — K60.30 ANAL FISTULA: ICD-10-CM

## 2020-06-23 PROCEDURE — 25500064 ZZH RX 255 OP 636: Performed by: COLON & RECTAL SURGERY

## 2020-06-23 PROCEDURE — 72197 MRI PELVIS W/O & W/DYE: CPT

## 2020-06-23 PROCEDURE — A9585 GADOBUTROL INJECTION: HCPCS | Performed by: COLON & RECTAL SURGERY

## 2020-06-23 RX ORDER — GADOBUTROL 604.72 MG/ML
10 INJECTION INTRAVENOUS ONCE
Status: COMPLETED | OUTPATIENT
Start: 2020-06-23 | End: 2020-06-23

## 2020-06-23 RX ADMIN — GADOBUTROL 10 ML: 604.72 INJECTION INTRAVENOUS at 19:47

## 2020-06-29 ENCOUNTER — TRANSFERRED RECORDS (OUTPATIENT)
Dept: HEALTH INFORMATION MANAGEMENT | Facility: CLINIC | Age: 35
End: 2020-06-29

## 2020-07-02 ENCOUNTER — TRANSFERRED RECORDS (OUTPATIENT)
Dept: HEALTH INFORMATION MANAGEMENT | Facility: CLINIC | Age: 35
End: 2020-07-02

## 2020-07-21 ENCOUNTER — TRANSFERRED RECORDS (OUTPATIENT)
Dept: HEALTH INFORMATION MANAGEMENT | Facility: CLINIC | Age: 35
End: 2020-07-21

## 2020-07-21 LAB
ALT SERPL-CCNC: 29 U/L (ref 0–78)
AST SERPL-CCNC: 24 U/L (ref 0–37)

## 2020-07-28 ENCOUNTER — TRANSFERRED RECORDS (OUTPATIENT)
Dept: HEALTH INFORMATION MANAGEMENT | Facility: CLINIC | Age: 35
End: 2020-07-28

## 2020-09-02 ENCOUNTER — TRANSFERRED RECORDS (OUTPATIENT)
Dept: HEALTH INFORMATION MANAGEMENT | Facility: CLINIC | Age: 35
End: 2020-09-02

## 2020-09-16 ENCOUNTER — TRANSFERRED RECORDS (OUTPATIENT)
Dept: HEALTH INFORMATION MANAGEMENT | Facility: CLINIC | Age: 35
End: 2020-09-16

## 2020-10-14 ENCOUNTER — TRANSFERRED RECORDS (OUTPATIENT)
Dept: HEALTH INFORMATION MANAGEMENT | Facility: CLINIC | Age: 35
End: 2020-10-14

## 2020-10-21 ENCOUNTER — TRANSFERRED RECORDS (OUTPATIENT)
Dept: HEALTH INFORMATION MANAGEMENT | Facility: CLINIC | Age: 35
End: 2020-10-21

## 2020-12-09 ENCOUNTER — TRANSFERRED RECORDS (OUTPATIENT)
Dept: HEALTH INFORMATION MANAGEMENT | Facility: CLINIC | Age: 35
End: 2020-12-09

## 2020-12-20 ENCOUNTER — HEALTH MAINTENANCE LETTER (OUTPATIENT)
Age: 35
End: 2020-12-20

## 2021-02-05 ENCOUNTER — TRANSFERRED RECORDS (OUTPATIENT)
Dept: HEALTH INFORMATION MANAGEMENT | Facility: CLINIC | Age: 36
End: 2021-02-05

## 2021-03-30 ASSESSMENT — ENCOUNTER SYMPTOMS
EYE PAIN: 0
CHILLS: 0
COUGH: 0
NERVOUS/ANXIOUS: 0
ARTHRALGIAS: 0
HEMATOCHEZIA: 0
WEAKNESS: 0
DIZZINESS: 0
JOINT SWELLING: 0
FEVER: 0
ABDOMINAL PAIN: 0
CONSTIPATION: 0
DIARRHEA: 0
PALPITATIONS: 0
DYSURIA: 0
SORE THROAT: 0
HEARTBURN: 0
MYALGIAS: 0
FREQUENCY: 0
PARESTHESIAS: 0
HEMATURIA: 0
NAUSEA: 0
SHORTNESS OF BREATH: 0
HEADACHES: 0

## 2021-04-01 ENCOUNTER — OFFICE VISIT (OUTPATIENT)
Dept: FAMILY MEDICINE | Facility: CLINIC | Age: 36
End: 2021-04-01
Payer: COMMERCIAL

## 2021-04-01 ENCOUNTER — TRANSFERRED RECORDS (OUTPATIENT)
Dept: HEALTH INFORMATION MANAGEMENT | Facility: CLINIC | Age: 36
End: 2021-04-01

## 2021-04-01 VITALS
DIASTOLIC BLOOD PRESSURE: 77 MMHG | TEMPERATURE: 98.5 F | SYSTOLIC BLOOD PRESSURE: 118 MMHG | WEIGHT: 228.4 LBS | HEIGHT: 73 IN | HEART RATE: 57 BPM | BODY MASS INDEX: 30.27 KG/M2 | OXYGEN SATURATION: 97 % | RESPIRATION RATE: 18 BRPM

## 2021-04-01 DIAGNOSIS — Z12.5 SCREENING FOR PROSTATE CANCER: ICD-10-CM

## 2021-04-01 DIAGNOSIS — Z13.6 CARDIOVASCULAR SCREENING; LDL GOAL LESS THAN 160: ICD-10-CM

## 2021-04-01 DIAGNOSIS — K50.00 CROHN'S DISEASE OF SMALL INTESTINE WITHOUT COMPLICATION (H): ICD-10-CM

## 2021-04-01 DIAGNOSIS — Z13.1 SCREENING FOR DIABETES MELLITUS (DM): ICD-10-CM

## 2021-04-01 DIAGNOSIS — Z00.00 ROUTINE GENERAL MEDICAL EXAMINATION AT A HEALTH CARE FACILITY: Primary | ICD-10-CM

## 2021-04-01 DIAGNOSIS — Z80.42 FAMILY HISTORY OF MALIGNANT NEOPLASM OF PROSTATE: ICD-10-CM

## 2021-04-01 LAB
ALBUMIN SERPL-MCNC: 4 G/DL (ref 3.4–5)
ALP SERPL-CCNC: 38 U/L (ref 40–150)
ALT SERPL W P-5'-P-CCNC: 36 U/L (ref 0–70)
ALT SERPL-CCNC: 35 U/L (ref 0–78)
ANION GAP SERPL CALCULATED.3IONS-SCNC: 2 MMOL/L (ref 3–14)
AST SERPL W P-5'-P-CCNC: 21 U/L (ref 0–45)
AST SERPL-CCNC: 23 U/L (ref 0–37)
BILIRUB SERPL-MCNC: 1.1 MG/DL (ref 0.2–1.3)
BUN SERPL-MCNC: 16 MG/DL (ref 7–30)
CALCIUM SERPL-MCNC: 9 MG/DL (ref 8.5–10.1)
CHLORIDE SERPL-SCNC: 107 MMOL/L (ref 94–109)
CHOLEST SERPL-MCNC: 236 MG/DL
CO2 SERPL-SCNC: 30 MMOL/L (ref 20–32)
CREAT SERPL-MCNC: 0.95 MG/DL (ref 0.66–1.25)
GFR SERPL CREATININE-BSD FRML MDRD: >90 ML/MIN/{1.73_M2}
GLUCOSE SERPL-MCNC: 85 MG/DL (ref 70–99)
HDLC SERPL-MCNC: 87 MG/DL
LDLC SERPL CALC-MCNC: 130 MG/DL
NONHDLC SERPL-MCNC: 149 MG/DL
POTASSIUM SERPL-SCNC: 4.2 MMOL/L (ref 3.4–5.3)
PROT SERPL-MCNC: 7.1 G/DL (ref 6.8–8.8)
PSA SERPL-ACNC: 0.44 UG/L (ref 0–4)
SODIUM SERPL-SCNC: 139 MMOL/L (ref 133–144)
TRIGL SERPL-MCNC: 94 MG/DL

## 2021-04-01 PROCEDURE — 36415 COLL VENOUS BLD VENIPUNCTURE: CPT | Performed by: NURSE PRACTITIONER

## 2021-04-01 PROCEDURE — G0103 PSA SCREENING: HCPCS | Performed by: NURSE PRACTITIONER

## 2021-04-01 PROCEDURE — 99395 PREV VISIT EST AGE 18-39: CPT | Performed by: NURSE PRACTITIONER

## 2021-04-01 PROCEDURE — 80061 LIPID PANEL: CPT | Performed by: NURSE PRACTITIONER

## 2021-04-01 PROCEDURE — 80053 COMPREHEN METABOLIC PANEL: CPT | Performed by: NURSE PRACTITIONER

## 2021-04-01 RX ORDER — AZATHIOPRINE 50 MG/1
TABLET ORAL
Status: CANCELLED | OUTPATIENT
Start: 2021-04-01

## 2021-04-01 RX ORDER — AZATHIOPRINE 50 MG/1
TABLET ORAL
COMMUNITY
Start: 2020-12-22 | End: 2024-06-18

## 2021-04-01 ASSESSMENT — ENCOUNTER SYMPTOMS
HEMATURIA: 0
FEVER: 0
ABDOMINAL PAIN: 0
DIARRHEA: 0
SORE THROAT: 0
DIZZINESS: 0
HEARTBURN: 0
PALPITATIONS: 0
HEMATOCHEZIA: 0
NAUSEA: 0
FREQUENCY: 0
DYSURIA: 0
CHILLS: 0
MYALGIAS: 0
CONSTIPATION: 0
PARESTHESIAS: 0
NERVOUS/ANXIOUS: 0
SHORTNESS OF BREATH: 0
HEADACHES: 0
ARTHRALGIAS: 0
EYE PAIN: 0
WEAKNESS: 0
JOINT SWELLING: 0
COUGH: 0

## 2021-04-01 ASSESSMENT — PAIN SCALES - GENERAL: PAINLEVEL: NO PAIN (0)

## 2021-04-01 ASSESSMENT — MIFFLIN-ST. JEOR: SCORE: 2014.9

## 2021-04-01 NOTE — PATIENT INSTRUCTIONS
PLAN:   1.   Symptomatic therapy suggested:Continue current medications as prescribed.   2.  Orders Placed This Encounter   Medications     azaTHIOprine (IMURAN) 50 MG tablet     Sig: TAKE 2 TABLETS BY MOUTH EVERY DAY     inFLIXimab-dyyb (INFLECTRA IV)     Orders Placed This Encounter   Procedures     JUST IN CASE     Lipid panel reflex to direct LDL Fasting     Comprehensive metabolic panel     Prostate spec antigen screen       3. Patient needs to follow up in if no improvement,or sooner if worsening of symptoms or other symptoms develop.  Patient will call when needed  Follow up office visit in one year for annual health maintenance exam, sooner PRN.

## 2021-04-01 NOTE — RESULT ENCOUNTER NOTE
Wesly Landeros,    Attached are your test results.  -PSA (prostate specific antigen) test is normal.  -LDL(bad) cholesterol level is elevated which can increase your heart disease risk.  A diet high in fat and simple carbohydrates, genetics and being overweight can contribute to this. ADVISE: exercising 150 minutes of aerobic exercise per week (30 minutes for 5 days per week or 50 minutes for 3 days per week are options) and eating a low saturated fat/low carbohydrate diet are helpful to improve this. In 12 months, you should recheck your fasting cholesterol panel by scheduling a lab-only appointment.  -Liver and gallbladder tests are normal (ALT,AST, Alk phos, bilirubin), kidney function is normal (Cr, GFR), sodium is normal, potassium is normal, calcium is normal, glucose is normal.   Please contact us if you have any questions.    Vy Argueta, CNP

## 2021-04-01 NOTE — PROGRESS NOTES
SUBJECTIVE:   CC: Loco Landeros is an 35 year old male who presents for preventative health visit.       Patient has been advised of split billing requirements and indicates understanding: Yes  Healthy Habits:     Getting at least 3 servings of Calcium per day:  NO    Bi-annual eye exam:  Yes    Dental care twice a year:  Yes    Sleep apnea or symptoms of sleep apnea:  None    Diet:  Regular (no restrictions)    Frequency of exercise:  4-5 days/week    Duration of exercise:  30-45 minutes    Medication side effects:  None    PHQ-2 Total Score: 1      Today's PHQ-2 Score:   PHQ-2 ( 1999 Pfizer) 3/30/2021   Q1: Little interest or pleasure in doing things 0   Q2: Feeling down, depressed or hopeless 1   PHQ-2 Score 1   Q1: Little interest or pleasure in doing things Not at all   Q2: Feeling down, depressed or hopeless Several days   PHQ-2 Score 1       Abuse: Current or Past(Physical, Sexual or Emotional)- No  Do you feel safe in your environment? Yes    Have you ever done Advance Care Planning? (For example, a Health Directive, POLST, or a discussion with a medical provider or your loved ones about your wishes): Yes, patient states has an Advance Care Planning document and will bring a copy to the clinic.    Social History     Tobacco Use     Smoking status: Former Smoker     Packs/day: 0.50     Years: 5.00     Pack years: 2.50     Types: Cigarettes     Smokeless tobacco: Former User     Types: Chew     Quit date: 1/1/2002     Tobacco comment: socially smoker   Substance Use Topics     Alcohol use: Yes     Comment: once a week      If you drink alcohol do you typically have >3 drinks per day or >7 drinks per week? No      AUDIT - Alcohol Use Disorders Identification Test - Reproduced from the World Health Organization Audit 2001 (Second Edition) 3/30/2021   1.  How often do you have a drink containing alcohol? 4 or more times a week   2.  How many drinks containing alcohol do you have on a typical day when you are  drinking? 3 or 4   3.  How often do you have five or more drinks on one occasion? Weekly   4.  How often during the last year have you found that you were not able to stop drinking once you had started? Weekly   5.  How often during the last year have you failed to do what was normally expected of you because of drinking? Never   6.  How often during the last year have you needed a first drink in the morning to get yourself going after a heavy drinking session? Never   7.  How often during the last year have you had a feeling of guilt or remorse after drinking? Monthly   8.  How often during the last year have you been unable to remember what happened the night before because of your drinking? Never   9.  Have you or someone else been injured because of your drinking? No   10. Has a relative, friend, doctor or other health care worker been concerned about your drinking or suggested you cut down? No   TOTAL SCORE 13       Last PSA:   PSA   Date Value Ref Range Status   04/01/2021 0.44 0 - 4 ug/L Final     Comment:     Assay Method:  Chemiluminescence using Siemens Vista analyzer       Reviewed orders with patient. Reviewed health maintenance and updated orders accordingly - Yes  Labs reviewed in EPIC  BP Readings from Last 3 Encounters:   04/01/21 118/77   06/05/20 (!) 133/95   05/22/20 119/83    Wt Readings from Last 3 Encounters:   04/01/21 103.6 kg (228 lb 6.4 oz)   06/05/20 99.6 kg (219 lb 9.8 oz)   05/22/20 101.1 kg (222 lb 12.8 oz)                  Patient Active Problem List   Diagnosis     Degeneration of lumbar or lumbosacral intervertebral disc     Family history of malignant neoplasm of prostate     Nicotine use disorder     Acne vulgaris     Past Surgical History:   Procedure Laterality Date     COLONOSCOPY N/A 6/5/2020    Procedure: INTRAOPERATIVE COLONOSCOPY WITH BIOPSIES;  Surgeon: Rosa Serrato MD;  Location: [facility] OR     COLONOSCOPY WITH CO2 INSUFFLATION N/A 10/2/2017    Procedure: COLONOSCOPY  WITH CO2 INSUFFLATION;  Colonoscopy, Vy Argueta, Rectal bleeding, BMI 29.74, [facility] [place] [phone]. ;  Surgeon: Justa Pickard MD;  Location: [facility] OR     EXAM UNDER ANESTHESIA RECTUM N/A 6/5/2020    Procedure: EXAM UNDER ANESTHESIA, RECTUM;  Surgeon: Rosa Serrato MD;  Location: [facility] OR     FISTULOTOMY RECTUM N/A 6/5/2020    Procedure: FISTULOTOMY, RECTUM,;  Surgeon: Rosa Serrato MD;  Location:  OR     NO HISTORY OF SURGERY       PLACEMENT OF SETON RECTUM N/A 6/5/2020    Procedure: PLACEMENT, SETON STITCH;  Surgeon: Rosa Serrato MD;  Location:  OR       Social History     Tobacco Use     Smoking status: Former Smoker     Packs/day: 0.50     Years: 5.00     Pack years: 2.50     Types: Cigarettes     Smokeless tobacco: Former User     Types: Chew     Quit date: 1/1/2002     Tobacco comment: socially smoker   Substance Use Topics     Alcohol use: Yes     Comment: once a week      Family History   Problem Relation Age of Onset     Prostate Cancer Father      Cancer Father      Hypertension Brother      Coronary Artery Disease Maternal Grandfather      Diabetes No family hx of      Hyperlipidemia No family hx of      Cerebrovascular Disease No family hx of      Breast Cancer No family hx of      Colon Cancer No family hx of      Depression No family hx of      Anxiety Disorder No family hx of      Thyroid Disease No family hx of      Genetic Disorder No family hx of      Asthma No family hx of          Current Outpatient Medications   Medication Sig Dispense Refill     azaTHIOprine (IMURAN) 50 MG tablet TAKE 2 TABLETS BY MOUTH EVERY DAY       inFLIXimab-dyyb (INFLECTRA IV)        Multiple Vitamins-Minerals (MULTIVITAMIN ADULT) CHEW Take 2 chew tab by mouth daily       No Known Allergies    Reviewed and updated as needed this visit by clinical staff                 Reviewed and updated as needed this visit by Provider                Past Medical History:   Diagnosis Date      NO ACTIVE PROBLEMS       Past Surgical History:   Procedure Laterality Date     COLONOSCOPY N/A 6/5/2020    Procedure: INTRAOPERATIVE COLONOSCOPY WITH BIOPSIES;  Surgeon: Rosa Serrato MD;  Location:  OR     COLONOSCOPY WITH CO2 INSUFFLATION N/A 10/2/2017    Procedure: COLONOSCOPY WITH CO2 INSUFFLATION;  Colonoscopy, Vy Argueta, Rectal bleeding, BMI 29.74[phone]. ;  Surgeon: Justa Pickard MD;  Location:  OR     EXAM UNDER ANESTHESIA RECTUM N/A 6/5/2020    Procedure: EXAM UNDER ANESTHESIA, RECTUM;  Surgeon: Rosa Serrato MD;  Location:  OR     FISTULOTOMY RECTUM N/A 6/5/2020    Procedure: FISTULOTOMY, RECTUM,;  Surgeon: Rosa Serrato MD;  Location:  OR     NO HISTORY OF SURGERY       PLACEMENT OF SETON RECTUM N/A 6/5/2020    Procedure: PLACEMENT, SETON STITCH;  Surgeon: Rosa Serrato MD;  Location:  OR       Review of Systems   Constitutional: Negative for chills and fever.   HENT: Negative for congestion, ear pain, hearing loss and sore throat.    Eyes: Negative for pain and visual disturbance.   Respiratory: Negative for cough and shortness of breath.    Cardiovascular: Positive for chest pain. Negative for palpitations and peripheral edema.        In the last few weeks has noted pain in the left side of chest   Would wake up with it   No heart burn noted   Has been running about 4 miles a day and not bothering him   Now pain is gone    Gastrointestinal: Negative for abdominal pain, constipation, diarrhea, heartburn, hematochezia and nausea.        Was diagnosed with Crohns this last year and is doing and sees [facility]    Genitourinary: Negative for discharge, dysuria, frequency, genital sores, hematuria, impotence and urgency.   Musculoskeletal: Negative for arthralgias, joint swelling and myalgias.   Skin: Negative for rash.   Neurological: Negative for dizziness, weakness, headaches and paresthesias.   Psychiatric/Behavioral: Negative  "for mood changes. The patient is not nervous/anxious.         Stress with continued quarantine        OBJECTIVE:   /77 (BP Location: Right arm, Patient Position: Sitting, Cuff Size: Adult Large)   Pulse 57   Temp 98.5  F (36.9  C) (Oral)   Resp 18   Ht 1.854 m (6' 1\")   Wt 103.6 kg (228 lb 6.4 oz)   SpO2 97%   BMI 30.13 kg/m      Physical Exam  GENERAL: healthy, alert and no distress  EYES: Eyes grossly normal to inspection and conjunctivae and sclerae normal  HENT: ear canals and TM's normal, nose and mouth without ulcers or lesions  NECK: no adenopathy, no asymmetry, masses, or scars and thyroid normal to palpation  RESP: lungs clear to auscultation - no rales, rhonchi or wheezes  CV: regular rate and rhythm, normal S1 S2, no S3 or S4, no murmur, click or rub, no peripheral edema and peripheral pulses strong  ABDOMEN: soft, nontender, no hepatosplenomegaly, no masses and bowel sounds normal   (male): normal male genitalia without lesions or urethral discharge, no hernia  MS: no gross musculoskeletal defects noted, no edema  SKIN: no suspicious lesions or rashes  NEURO: Normal strength and tone, mentation intact and speech normal  PSYCH: mentation appears normal, affect normal/bright  LYMPH: no cervical, supraclavicular, axillary, or inguinal adenopathy    Diagnostic Test Results:  Labs reviewed in Epic  Results for orders placed or performed in visit on 04/01/21   Lipid panel reflex to direct LDL Fasting     Status: Abnormal   Result Value Ref Range    Cholesterol 236 (H) <200 mg/dL    Triglycerides 94 <150 mg/dL    HDL Cholesterol 87 >39 mg/dL    LDL Cholesterol Calculated 130 (H) <100 mg/dL    Non HDL Cholesterol 149 (H) <130 mg/dL   Comprehensive metabolic panel     Status: Abnormal   Result Value Ref Range    Sodium 139 133 - 144 mmol/L    Potassium 4.2 3.4 - 5.3 mmol/L    Chloride 107 94 - 109 mmol/L    Carbon Dioxide 30 20 - 32 mmol/L    Anion Gap 2 (L) 3 - 14 mmol/L    Glucose 85 70 - 99 " "mg/dL    Urea Nitrogen 16 7 - 30 mg/dL    Creatinine 0.95 0.66 - 1.25 mg/dL    GFR Estimate >90 >60 mL/min/[1.73_m2]    GFR Estimate If Black >90 >60 mL/min/[1.73_m2]    Calcium 9.0 8.5 - 10.1 mg/dL    Bilirubin Total 1.1 0.2 - 1.3 mg/dL    Albumin 4.0 3.4 - 5.0 g/dL    Protein Total 7.1 6.8 - 8.8 g/dL    Alkaline Phosphatase 38 (L) 40 - 150 U/L    ALT 36 0 - 70 U/L    AST 21 0 - 45 U/L   Prostate spec antigen screen     Status: None   Result Value Ref Range    PSA 0.44 0 - 4 ug/L       ASSESSMENT/PLAN:   Loco was seen today for physical.    Diagnoses and all orders for this visit:    Routine general medical examination at a health care facility    CARDIOVASCULAR SCREENING; LDL GOAL LESS THAN 160  -     Lipid panel reflex to direct LDL Fasting    Screening for diabetes mellitus (DM)  -     JUST IN CASE  -     Comprehensive metabolic panel    Crohn's disease of small intestine without complication (H)  FOLLOW UP WITH SPECIALIST :Gastroenterology    Screening for prostate cancer  -     Prostate spec antigen screen    Family history of malignant neoplasm of prostate  -     Prostate spec antigen screen    PLAN:   Patient needs to follow up in if no improvement,or sooner if worsening of symptoms or other symptoms develop.  Patient will call when needed  Follow up office visit in one year for annual health maintenance exam, sooner PRN.      Patient has been advised of split billing requirements and indicates understanding: Yes  COUNSELING:   Reviewed preventive health counseling, as reflected in patient instructions  Special attention given to:        Regular exercise       Healthy diet/nutrition       Vision screening       Alcohol Use        Prostate cancer screening    Estimated body mass index is 30.13 kg/m  as calculated from the following:    Height as of this encounter: 1.854 m (6' 1\").    Weight as of this encounter: 103.6 kg (228 lb 6.4 oz).     Weight management plan: Discussed healthy diet and exercise " guidelines    He reports that he has quit smoking. His smoking use included cigarettes. He has a 2.50 pack-year smoking history. He quit smokeless tobacco use about 19 years ago.  His smokeless tobacco use included chew.      Counseling Resources:  ATP IV Guidelines  Pooled Cohorts Equation Calculator  FRAX Risk Assessment  ICSI Preventive Guidelines  Dietary Guidelines for Americans, 2010  USDA's MyPlate  ASA Prophylaxis  Lung CA Screening    ESSIE Petersen [facility]

## 2021-04-02 ENCOUNTER — MYC MEDICAL ADVICE (OUTPATIENT)
Dept: FAMILY MEDICINE | Facility: CLINIC | Age: 36
End: 2021-04-02

## 2021-04-02 DIAGNOSIS — R07.89 ATYPICAL CHEST PAIN: Primary | ICD-10-CM

## 2021-04-09 ENCOUNTER — TRANSFERRED RECORDS (OUTPATIENT)
Dept: HEALTH INFORMATION MANAGEMENT | Facility: CLINIC | Age: 36
End: 2021-04-09

## 2021-04-12 ENCOUNTER — TRANSCRIBE ORDERS (OUTPATIENT)
Dept: OTHER | Age: 36
End: 2021-04-12

## 2021-04-12 DIAGNOSIS — Z12.83 SCREENING FOR SKIN CANCER: Primary | ICD-10-CM

## 2021-04-12 DIAGNOSIS — Z12.11 ENCOUNTER FOR SCREENING COLONOSCOPY: ICD-10-CM

## 2021-04-12 DIAGNOSIS — K50.013 CROHN'S DISEASE OF SMALL INTESTINE WITH FISTULA (H): Primary | ICD-10-CM

## 2021-04-30 NOTE — TELEPHONE ENCOUNTER
Called patient and discussed message.  He agrees to plan and would like to have the phone number to schedule sent in a [employer] message as he is out running.    Patient is still planning on coming in on Monday to see ESSIE Carbajal CNP, RN, [facility]

## 2021-04-30 NOTE — TELEPHONE ENCOUNTER
Let go ahead and order a stress test in the meantime as if these symptoms are persisting despite he can run without symptoms    I will place the order and lets get him scheduled

## 2021-04-30 NOTE — TELEPHONE ENCOUNTER
Patient called back.    He states these pains have been since early March.  Dull pain in a spot over the heart and sometime down left arm.  These pains are intermittent, and currently he does not even notice this.  He rates the discomfort or dull ache a 4/10 in the mornings. 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      The patient made the appointment on 4/14/21 for 5/3/21.  He shared these symptoms at his Physical with Christine Argueta NP.  He was instructed to call back if this did not improve.      He states he would like to know if it is something that he needs to be aware of down the road.  He does not feel it is emergent now.    Advised to call 911 if chest pain returns that is worsening, SOB, or heaviness on chest.   He agrees to plan.    Gayathri Sotelo RN, [facility]

## 2021-04-30 NOTE — TELEPHONE ENCOUNTER
Called patient to triage, appt notes on Monday, 5/3/21 say chest pains.  Appt was made on 4/14/21       If patient calls back, please direct call to the [place]  RN team.    Gayathri Sotelo RN, [facility]

## 2021-05-03 ENCOUNTER — OFFICE VISIT (OUTPATIENT)
Dept: FAMILY MEDICINE | Facility: CLINIC | Age: 36
End: 2021-05-03
Payer: COMMERCIAL

## 2021-05-03 ENCOUNTER — ANCILLARY PROCEDURE (OUTPATIENT)
Dept: GENERAL RADIOLOGY | Facility: CLINIC | Age: 36
End: 2021-05-03
Attending: NURSE PRACTITIONER
Payer: COMMERCIAL

## 2021-05-03 VITALS
DIASTOLIC BLOOD PRESSURE: 74 MMHG | TEMPERATURE: 98.6 F | BODY MASS INDEX: 29.92 KG/M2 | SYSTOLIC BLOOD PRESSURE: 110 MMHG | RESPIRATION RATE: 16 BRPM | WEIGHT: 226.8 LBS | OXYGEN SATURATION: 97 % | HEART RATE: 60 BPM

## 2021-05-03 DIAGNOSIS — R07.89 ATYPICAL CHEST PAIN: ICD-10-CM

## 2021-05-03 DIAGNOSIS — R07.89 ATYPICAL CHEST PAIN: Primary | ICD-10-CM

## 2021-05-03 PROCEDURE — 99214 OFFICE O/P EST MOD 30 MIN: CPT | Performed by: NURSE PRACTITIONER

## 2021-05-03 PROCEDURE — 93000 ELECTROCARDIOGRAM COMPLETE: CPT | Performed by: NURSE PRACTITIONER

## 2021-05-03 PROCEDURE — 71046 X-RAY EXAM CHEST 2 VIEWS: CPT | Mod: FY | Performed by: RADIOLOGY

## 2021-05-03 RX ORDER — OMEPRAZOLE 20 MG/1
20 TABLET, DELAYED RELEASE ORAL DAILY
Qty: 30 TABLET | Refills: 1 | Status: SHIPPED | OUTPATIENT
Start: 2021-05-03 | End: 2022-04-04

## 2021-05-03 NOTE — PROGRESS NOTES
Nuno Paez is a 35 year old who presents for the following health issues     HPI     Chest Pain  Onset/Duration:   Description:   Location: left side  Character: sharp and dull ache  Radiation: on left side  Duration:  Intermittent, can last couple hours, all day  Intensity: mild  Progression of Symptoms: improving and intermittent  Accompanying Signs & Symptoms:  Shortness of breath: no  Sweating: no  Nausea/vomiting: no  Lightheadedness: YES  Palpitations: YES  Fever/Chills: no  Cough: no           Heartburn: no  History:   Family history of heart disease: no  Tobacco use: no  Previous similar symptoms: no   Precipitating factors:   Worse with exertion: no  Worse with deep breaths: YES           Related to eating: YES           Better with burping: no  Alleviating factors: none  Therapies tried and outcome: none  Pain has been going on for at least 2 months   Thinks it may be linked if eats and then goes to not as long after eating will wake up   Has not tried anything yet  Will also also some intermittent palpitations will have them once a month at the most but not at the same time as the pain   The pain in the left chest are mostly sharp and dull   Also feels a sense on pressure    He runs 3 to 5 days a week 4 miles and no chest pain with that   It is mostly in the morning and when he notices it    Sometimes can hurt if he takes a deep breath  No lightheadedness when he runs.  Has been a runner for a long time   Does have a couple drinks 2 or 3 days a week         Labs reviewed in EPIC  BP Readings from Last 3 Encounters:   05/03/21 110/74   04/01/21 118/77   06/05/20 (!) 133/95    Wt Readings from Last 3 Encounters:   05/03/21 102.9 kg (226 lb 12.8 oz)   04/01/21 103.6 kg (228 lb 6.4 oz)   06/05/20 99.6 kg (219 lb 9.8 oz)                  Patient Active Problem List   Diagnosis     Degeneration of lumbar or lumbosacral intervertebral disc     Family history of malignant neoplasm of prostate      Nicotine use disorder     Acne vulgaris     Crohn's disease of small intestine without complication (H)     Past Surgical History:   Procedure Laterality Date     COLONOSCOPY N/A 6/5/2020    Procedure: INTRAOPERATIVE COLONOSCOPY WITH BIOPSIES;  Surgeon: Rosa Serrato MD;  Location:  OR     COLONOSCOPY WITH CO2 INSUFFLATION N/A 10/2/2017    Procedure: COLONOSCOPY WITH CO2 INSUFFLATION;  Colonoscopy, Vy Argueta, Rectal bleeding, BMI 29.74, [facility] [place] [phone]. ;  Surgeon: Justa Pickard MD;  Location: [facility] OR     EXAM UNDER ANESTHESIA RECTUM N/A 6/5/2020    Procedure: EXAM UNDER ANESTHESIA, RECTUM;  Surgeon: Rosa Serrato MD;  Location: [facility] OR     FISTULOTOMY RECTUM N/A 6/5/2020    Procedure: FISTULOTOMY, RECTUM,;  Surgeon: Rosa Serrato MD;  Location:  OR     NO HISTORY OF SURGERY       PLACEMENT OF SETON RECTUM N/A 6/5/2020    Procedure: PLACEMENT, SETON STITCH;  Surgeon: Rosa Serrato MD;  Location:  OR       Social History     Tobacco Use     Smoking status: Former Smoker     Packs/day: 0.50     Years: 5.00     Pack years: 2.50     Types: Cigarettes     Smokeless tobacco: Former User     Types: Chew     Quit date: 1/1/2002     Tobacco comment: socially smoker   Substance Use Topics     Alcohol use: Yes     Comment: once a week      Family History   Problem Relation Age of Onset     Prostate Cancer Father      Cancer Father      Hypertension Brother      Coronary Artery Disease Maternal Grandfather      Diabetes No family hx of      Hyperlipidemia No family hx of      Cerebrovascular Disease No family hx of      Breast Cancer No family hx of      Colon Cancer No family hx of      Depression No family hx of      Anxiety Disorder No family hx of      Thyroid Disease No family hx of      Genetic Disorder No family hx of      Asthma No family hx of          Current Outpatient Medications   Medication Sig Dispense Refill     azaTHIOprine (IMURAN) 50 MG tablet  TAKE 2 TABLETS BY MOUTH EVERY DAY       inFLIXimab-dyyb (INFLECTRA IV)        Multiple Vitamins-Minerals (MULTIVITAMIN ADULT) CHEW Take 2 chew tab by mouth daily       No Known Allergies      Review of Systems   CONSTITUTIONAL:NEGATIVE for fever, chills, change in weight  INTEGUMENTARY/SKIN: NEGATIVE for rash   ENT/MOUTH: NEGATIVE for ear, mouth and throat problems  RESP:POSITIVE for some chest pain when woke up in the morning  and NEGATIVE for cough-non productive, cough-productive, dyspnea on exertion, Hx asthma and SOB/dyspnea  CV: POSITIVE for chest pain/chest pressure and NEGATIVE for diaphoresis, dyspnea on exertion, irregular heart beat, lower extremity edema and syncope or near-syncope  GI: NEGATIVE for nausea, abdominal pain, heartburn, or change in bowel habits  MUSCULOSKELETAL: NEGATIVE for significant arthralgias or myalgia  NEURO: POSITIVE for dizziness/lightheadedness and NEGATIVE for HX CVA, HX TIA, HX seizure D/O, involuntary movements, gait disturbance, loss of consciousness, syncope and vertigo  ENDOCRINE: NEGATIVE for temperature intolerance, skin/hair changes  HEME/ALLERGY/IMMUNE: NEGATIVE for bleeding problems  PSYCHIATRIC: NEGATIVE for changes in mood or affect      Objective    /74   Pulse 60   Temp 98.6  F (37  C) (Oral)   Resp 16   Wt 102.9 kg (226 lb 12.8 oz)   SpO2 97%   BMI 29.92 kg/m    Body mass index is 29.92 kg/m .  Physical Exam   GENERAL APPEARANCE: healthy, alert and no distress  NECK: no adenopathy, no asymmetry, masses, or scars and thyroid normal to palpation  RESP: lungs clear to auscultation - no rales, rhonchi or wheezes  CV: regular rates and rhythm and no murmur, click or rub  ABDOMEN: soft, nontender, without hepatosplenomegaly or masses and bowel sounds normal  MS: extremities normal- no gross deformities noted  SKIN: no suspicious lesions or rashes  NEURO: Normal strength and tone, mentation intact and speech normal  PSYCH: mentation appears normal and affect  normal/bright  MENTAL STATUS EXAM:  Appearance/Behavior: No apparent distress, Neatly groomed and Dressed appropriately for weather  Speech: Normal  Mood/Affect: normal affect  Insight: Adequate    Results for orders placed or performed in visit on 05/03/21   XR Chest 2 Views     Status: None    Narrative    XR CHEST 2 VW 5/3/2021 6:26 PM    HISTORY: Atypical chest pain    COMPARISON: None.      Impression    IMPRESSION: No acute findings. The lungs are clear and there are no  pleural effusions. Normal heart size.    ABDIRIZAK STAFFORD MD   EKG was done.  sinus bradycardia, normal axis, normal intervals, no acute ST/T changes c/w ischemia, no acute ST-T wave changes suggestive of ischemia, no LVH by voltage criteria    Loco was seen today for chest pain.    Diagnoses and all orders for this visit:    Atypical chest pain  -     EKG 12-lead complete w/read - Clinics  -     XR Chest 2 Views; Future  -     omeprazole (PRILOSEC OTC) 20 MG EC tablet; Take 1 tablet (20 mg) by mouth daily        Patient Instructions     PLAN:   1.   Symptomatic therapy suggested: will start on the prilosec once a day.    2.  Orders Placed This Encounter   Medications     omeprazole (PRILOSEC OTC) 20 MG EC tablet     Sig: Take 1 tablet (20 mg) by mouth daily     Dispense:  30 tablet     Refill:  1     Orders Placed This Encounter   Procedures     XR Chest 2 Views     EKG 12-lead complete w/read - Clinics       3. Patient needs to follow up in if no improvement,or sooner if worsening of symptoms or other symptoms develop  Will follow up and/or notify patient of  results via My Chart to determine further need for followup  Let me know on my chart how you are doing in a couple weeks.          Patient Education     GERD (Adult)    The esophagus is a tube that carries food from the mouth to the stomach. A valve (the LES, lower esophageal sphincter) at the lower end of the esophagus prevents stomach acid from flowing upward. When this valve  "doesn't work properly, stomach contents may repeatedly flow back up (reflux) into the esophagus. This is called gastroesophageal reflux disease (GERD). GERD can irritate the esophagus. It can cause problems with pain, swallowing or breathing. In severe cases, GERD can cause recurrent pneumonia (from aspiration or breathing in particles) or other serious problems.  Symptoms of reflux include burning, pressure or sharp pain in the upper abdomen or mid to lower chest. The pain can spread to the neck, back, or shoulder. There may be belching, an acid taste in the back of the throat, chronic cough, or sore throat, or hoarseness. GERD symptoms often occur during the day after a big meal. They can also occur at night when lying down.   Home care  Lifestyle changes can help reduce symptoms. If needed, your healthcare provider may prescribe medicines. Symptoms often improve with treatment, but if treatment is stopped, the symptoms often return after a few months. So most persons with GERD will need to continue treatment or get treatment on and off.  Lifestyle changes    Limit or avoid fatty, fried, and spicy foods, as well as coffee, chocolate, mint, and foods with high acid content such as tomatoes and citrus fruit and juices (orange, grapefruit, lemon).    Don t eat large meals, especially at night. Frequent, smaller meals are best. Don't lie down right after eating. And don t eat anything 3 hours before going to bed.    Don't drink alcohol or smoke. As much as possible, stay away from second hand smoke.    If you are overweight, losing weight will reduce symptoms.     Don't wear tight clothing around your stomach area.    If your symptoms occur during sleep, use a foam wedge to elevate your upper body (not just your head.) Or, place 4\" blocks under the head of your bed. Or use 2 bed risers under your bedframe.  Medicines  If needed, medicines can help relieve the symptoms of GERD and prevent damage to the esophagus. " Discuss a medicine plan with your healthcare provider. This may include one or more of the following medicines:    Antacids to help neutralize the normal acids in your stomach.    Acid blockers (Histamine or H2 blockers) to decrease acid production.    Acid inhibitors (proton pump inhibitors PPIs) to decrease acid production in a different way than the blockers. They may work better, but can take a little longer to take effect.  Take an antacid 30 to 60 minutes after eating and at bedtime, but not at the same time as an acid blocker.  Try not to take medicines such as ibuprofen and aspirin. If you are taking aspirin for your heart or other medical reasons, talk to your healthcare provider about stopping it.  Follow-up care  Follow up with your healthcare provider or as advised by our staff.  When to seek medical advice  Call your healthcare provider if any of the following occur:    Stomach pain gets worse or moves to the lower right abdomen (appendix area)    Chest pain appears or gets worse, or spreads to the back, neck, shoulder, or arm    An over-the-counter trial of medicine doesn't relieve your symptoms    Weight loss that can't be explained    Trouble or pain swallowing    Frequent vomiting (can t keep down liquids)    Blood in the stool or vomit (red or black in color)    Feeling weak or dizzy    Fever of 100.4 F (38 C) or higher, or as directed by your healthcare provider  [employer] last reviewed this educational content on 3/1/2018    7543-7648 [employer]. All rights reserved. This information is not intended as a substitute for professional medical care. Always follow your healthcare professional's instructions.               ESSIE Petersen [facility]

## 2021-05-03 NOTE — PATIENT INSTRUCTIONS
PLAN:   1.   Symptomatic therapy suggested: will start on the prilosec once a day.    2.  Orders Placed This Encounter   Medications     omeprazole (PRILOSEC OTC) 20 MG EC tablet     Sig: Take 1 tablet (20 mg) by mouth daily     Dispense:  30 tablet     Refill:  1     Orders Placed This Encounter   Procedures     XR Chest 2 Views     EKG 12-lead complete w/read - Clinics       3. Patient needs to follow up in if no improvement,or sooner if worsening of symptoms or other symptoms develop  Will follow up and/or notify patient of  results via My Chart to determine further need for followup  Let me know on my chart how you are doing in a couple weeks.          Patient Education     GERD (Adult)    The esophagus is a tube that carries food from the mouth to the stomach. A valve (the LES, lower esophageal sphincter) at the lower end of the esophagus prevents stomach acid from flowing upward. When this valve doesn't work properly, stomach contents may repeatedly flow back up (reflux) into the esophagus. This is called gastroesophageal reflux disease (GERD). GERD can irritate the esophagus. It can cause problems with pain, swallowing or breathing. In severe cases, GERD can cause recurrent pneumonia (from aspiration or breathing in particles) or other serious problems.  Symptoms of reflux include burning, pressure or sharp pain in the upper abdomen or mid to lower chest. The pain can spread to the neck, back, or shoulder. There may be belching, an acid taste in the back of the throat, chronic cough, or sore throat, or hoarseness. GERD symptoms often occur during the day after a big meal. They can also occur at night when lying down.   Home care  Lifestyle changes can help reduce symptoms. If needed, your healthcare provider may prescribe medicines. Symptoms often improve with treatment, but if treatment is stopped, the symptoms often return after a few months. So most persons with GERD will need to continue treatment or get  "treatment on and off.  Lifestyle changes    Limit or avoid fatty, fried, and spicy foods, as well as coffee, chocolate, mint, and foods with high acid content such as tomatoes and citrus fruit and juices (orange, grapefruit, lemon).    Don t eat large meals, especially at night. Frequent, smaller meals are best. Don't lie down right after eating. And don t eat anything 3 hours before going to bed.    Don't drink alcohol or smoke. As much as possible, stay away from second hand smoke.    If you are overweight, losing weight will reduce symptoms.     Don't wear tight clothing around your stomach area.    If your symptoms occur during sleep, use a foam wedge to elevate your upper body (not just your head.) Or, place 4\" blocks under the head of your bed. Or use 2 bed risers under your bedframe.  Medicines  If needed, medicines can help relieve the symptoms of GERD and prevent damage to the esophagus. Discuss a medicine plan with your healthcare provider. This may include one or more of the following medicines:    Antacids to help neutralize the normal acids in your stomach.    Acid blockers (Histamine or H2 blockers) to decrease acid production.    Acid inhibitors (proton pump inhibitors PPIs) to decrease acid production in a different way than the blockers. They may work better, but can take a little longer to take effect.  Take an antacid 30 to 60 minutes after eating and at bedtime, but not at the same time as an acid blocker.  Try not to take medicines such as ibuprofen and aspirin. If you are taking aspirin for your heart or other medical reasons, talk to your healthcare provider about stopping it.  Follow-up care  Follow up with your healthcare provider or as advised by our staff.  When to seek medical advice  Call your healthcare provider if any of the following occur:    Stomach pain gets worse or moves to the lower right abdomen (appendix area)    Chest pain appears or gets worse, or spreads to the back, neck, " shoulder, or arm    An over-the-counter trial of medicine doesn't relieve your symptoms    Weight loss that can't be explained    Trouble or pain swallowing    Frequent vomiting (can t keep down liquids)    Blood in the stool or vomit (red or black in color)    Feeling weak or dizzy    Fever of 100.4 F (38 C) or higher, or as directed by your healthcare provider  [employer] last reviewed this educational content on 3/1/2018    3925-0238 [employer]. All rights reserved. This information is not intended as a substitute for professional medical care. Always follow your healthcare professional's instructions.

## 2021-05-04 NOTE — RESULT ENCOUNTER NOTE
Wesly Landeros,    Attached are your test results.  -Chest xray was normal   Please contact us if you have any questions.    Vy Argueta, CNP

## 2021-05-27 ENCOUNTER — TRANSFERRED RECORDS (OUTPATIENT)
Dept: HEALTH INFORMATION MANAGEMENT | Facility: CLINIC | Age: 36
End: 2021-05-27

## 2021-05-27 LAB
ALT SERPL-CCNC: 36 U/L (ref 0–78)
AST SERPL-CCNC: 27 U/L (ref 0–37)

## 2021-08-02 ENCOUNTER — TRANSFERRED RECORDS (OUTPATIENT)
Dept: HEALTH INFORMATION MANAGEMENT | Facility: CLINIC | Age: 36
End: 2021-08-02

## 2021-09-28 ENCOUNTER — TRANSFERRED RECORDS (OUTPATIENT)
Dept: HEALTH INFORMATION MANAGEMENT | Facility: CLINIC | Age: 36
End: 2021-09-28

## 2021-09-28 LAB
ALT SERPL-CCNC: 21 IU/L (ref 0–44)
AST SERPL-CCNC: 19 IU/L (ref 0–40)

## 2021-10-03 ENCOUNTER — HEALTH MAINTENANCE LETTER (OUTPATIENT)
Age: 36
End: 2021-10-03

## 2021-11-12 ENCOUNTER — TRANSFERRED RECORDS (OUTPATIENT)
Dept: HEALTH INFORMATION MANAGEMENT | Facility: CLINIC | Age: 36
End: 2021-11-12
Payer: COMMERCIAL

## 2021-11-30 ENCOUNTER — TRANSFERRED RECORDS (OUTPATIENT)
Dept: HEALTH INFORMATION MANAGEMENT | Facility: CLINIC | Age: 36
End: 2021-11-30
Payer: COMMERCIAL

## 2021-11-30 LAB
ALT SERPL-CCNC: 26 LU/L (ref 0–44)
AST SERPL-CCNC: 33 LU/L (ref 0–40)

## 2022-01-25 ENCOUNTER — TRANSFERRED RECORDS (OUTPATIENT)
Dept: HEALTH INFORMATION MANAGEMENT | Facility: CLINIC | Age: 37
End: 2022-01-25
Payer: COMMERCIAL

## 2022-01-25 LAB
ALT SERPL-CCNC: 22 IU/L (ref 0–44)
AST SERPL-CCNC: 26 IU/L (ref 0–40)

## 2022-02-04 ENCOUNTER — TRANSFERRED RECORDS (OUTPATIENT)
Dept: HEALTH INFORMATION MANAGEMENT | Facility: CLINIC | Age: 37
End: 2022-02-04
Payer: COMMERCIAL

## 2022-03-23 ENCOUNTER — TRANSFERRED RECORDS (OUTPATIENT)
Dept: HEALTH INFORMATION MANAGEMENT | Facility: CLINIC | Age: 37
End: 2022-03-23
Payer: COMMERCIAL

## 2022-03-23 LAB
ALT SERPL-CCNC: 18 IU/L (ref 0–44)
AST SERPL-CCNC: 24 IU/L (ref 0–40)

## 2022-04-04 ENCOUNTER — OFFICE VISIT (OUTPATIENT)
Dept: FAMILY MEDICINE | Facility: CLINIC | Age: 37
End: 2022-04-04
Payer: COMMERCIAL

## 2022-04-04 VITALS
HEART RATE: 60 BPM | WEIGHT: 229.7 LBS | HEIGHT: 73 IN | SYSTOLIC BLOOD PRESSURE: 119 MMHG | BODY MASS INDEX: 30.44 KG/M2 | RESPIRATION RATE: 20 BRPM | TEMPERATURE: 98.1 F | OXYGEN SATURATION: 97 % | DIASTOLIC BLOOD PRESSURE: 82 MMHG

## 2022-04-04 DIAGNOSIS — Z13.0 SCREENING FOR DISORDER OF BLOOD AND BLOOD-FORMING ORGANS: ICD-10-CM

## 2022-04-04 DIAGNOSIS — K50.00 CROHN'S DISEASE OF SMALL INTESTINE WITHOUT COMPLICATION (H): ICD-10-CM

## 2022-04-04 DIAGNOSIS — Z13.1 SCREENING FOR DIABETES MELLITUS (DM): ICD-10-CM

## 2022-04-04 DIAGNOSIS — Z00.00 ROUTINE GENERAL MEDICAL EXAMINATION AT A HEALTH CARE FACILITY: Primary | ICD-10-CM

## 2022-04-04 DIAGNOSIS — Z13.6 CARDIOVASCULAR SCREENING; LDL GOAL LESS THAN 160: ICD-10-CM

## 2022-04-04 DIAGNOSIS — R07.89 ATYPICAL CHEST PAIN: ICD-10-CM

## 2022-04-04 LAB
ALBUMIN SERPL-MCNC: 3.7 G/DL (ref 3.4–5)
ALP SERPL-CCNC: 32 U/L (ref 40–150)
ALT SERPL W P-5'-P-CCNC: 30 U/L (ref 0–70)
ANION GAP SERPL CALCULATED.3IONS-SCNC: 5 MMOL/L (ref 3–14)
AST SERPL W P-5'-P-CCNC: 25 U/L (ref 0–45)
BILIRUB SERPL-MCNC: 1.1 MG/DL (ref 0.2–1.3)
BUN SERPL-MCNC: 12 MG/DL (ref 7–30)
CALCIUM SERPL-MCNC: 9.3 MG/DL (ref 8.5–10.1)
CHLORIDE BLD-SCNC: 107 MMOL/L (ref 94–109)
CHOLEST SERPL-MCNC: 244 MG/DL
CO2 SERPL-SCNC: 27 MMOL/L (ref 20–32)
CREAT SERPL-MCNC: 0.99 MG/DL (ref 0.66–1.25)
ERYTHROCYTE [DISTWIDTH] IN BLOOD BY AUTOMATED COUNT: 13.5 % (ref 10–15)
FASTING STATUS PATIENT QL REPORTED: YES
GFR SERPL CREATININE-BSD FRML MDRD: >90 ML/MIN/1.73M2
GLUCOSE BLD-MCNC: 87 MG/DL (ref 70–99)
HCT VFR BLD AUTO: 45.7 % (ref 40–53)
HDLC SERPL-MCNC: 100 MG/DL
HGB BLD-MCNC: 15.2 G/DL (ref 13.3–17.7)
LDLC SERPL CALC-MCNC: 123 MG/DL
MCH RBC QN AUTO: 30.4 PG (ref 26.5–33)
MCHC RBC AUTO-ENTMCNC: 33.3 G/DL (ref 31.5–36.5)
MCV RBC AUTO: 91 FL (ref 78–100)
NONHDLC SERPL-MCNC: 144 MG/DL
PLATELET # BLD AUTO: 220 10E3/UL (ref 150–450)
POTASSIUM BLD-SCNC: 4.2 MMOL/L (ref 3.4–5.3)
PROT SERPL-MCNC: 7 G/DL (ref 6.8–8.8)
RBC # BLD AUTO: 5 10E6/UL (ref 4.4–5.9)
SODIUM SERPL-SCNC: 139 MMOL/L (ref 133–144)
TRIGL SERPL-MCNC: 105 MG/DL
WBC # BLD AUTO: 4.9 10E3/UL (ref 4–11)

## 2022-04-04 PROCEDURE — 80053 COMPREHEN METABOLIC PANEL: CPT | Performed by: NURSE PRACTITIONER

## 2022-04-04 PROCEDURE — 36415 COLL VENOUS BLD VENIPUNCTURE: CPT | Performed by: NURSE PRACTITIONER

## 2022-04-04 PROCEDURE — 85027 COMPLETE CBC AUTOMATED: CPT | Performed by: NURSE PRACTITIONER

## 2022-04-04 PROCEDURE — 99395 PREV VISIT EST AGE 18-39: CPT | Performed by: NURSE PRACTITIONER

## 2022-04-04 PROCEDURE — 80061 LIPID PANEL: CPT | Performed by: NURSE PRACTITIONER

## 2022-04-04 ASSESSMENT — ENCOUNTER SYMPTOMS
HEADACHES: 0
COUGH: 0
DIZZINESS: 0
HEMATURIA: 0
CONSTIPATION: 0
JOINT SWELLING: 0
PARESTHESIAS: 0
SORE THROAT: 0
CHILLS: 0
FREQUENCY: 0
PALPITATIONS: 0
EYE PAIN: 0
FEVER: 0
SHORTNESS OF BREATH: 0
NAUSEA: 0
DYSURIA: 0
MYALGIAS: 1
DIARRHEA: 0
WEAKNESS: 0
HEMATOCHEZIA: 0
HEARTBURN: 0
NERVOUS/ANXIOUS: 0
ABDOMINAL PAIN: 0
ARTHRALGIAS: 0

## 2022-04-04 NOTE — PATIENT INSTRUCTIONS
PLAN:   1.   Symptomatic therapy suggested: Continue current medications as prescribed.   2.  Orders Placed This Encounter   Procedures     REVIEW OF HEALTH MAINTENANCE PROTOCOL ORDERS     CT Chest w/o Contrast     CBC with platelets     Comprehensive metabolic panel     Lipid panel reflex to direct LDL Fasting     3. Patient needs to follow up in if no improvement,or sooner if worsening of symptoms or other symptoms develop.  Work on weight loss  Regular exercise  Ct chest   I will place order. Please call [phone] to schedule.  Will follow up and/or notify patient of  results via My Chart to determine further need for followup  Follow up office visit in one year for annual health maintenance exam, sooner PRN.    Preventive Health Recommendations  Male Ages 26 - 39    Yearly exam:             See your health care provider every year in order to  o   Review health changes.   o   Discuss preventive care.    o   Review your medicines if your doctor has prescribed any.    You should be tested each year for STDs (sexually transmitted diseases), if you re at risk.     After age 35, talk to your provider about cholesterol testing. If you are at risk for heart disease, have your cholesterol tested at least every 5 years.     If you are at risk for diabetes, you should have a diabetes test (fasting glucose).  Shots: Get a flu shot each year. Get a tetanus shot every 10 years.     Nutrition:    Eat at least 5 servings of fruits and vegetables daily.     Eat whole-grain bread, whole-wheat pasta and brown rice instead of white grains and rice.     Get adequate Calcium and Vitamin D.     Lifestyle    Exercise for at least 150 minutes a week (30 minutes a day, 5 days a week). This will help you control your weight and prevent disease.     Limit alcohol to one drink per day.     No smoking.     Wear sunscreen to prevent skin cancer.     See your dentist every six months for an exam and cleaning.

## 2022-04-04 NOTE — PROGRESS NOTES
{PROVIDER CHARTING PREFERENCE:725972}    Subjective   Philippe is a 36 year old who presents for the following health issues {ACCOMPANIED BY STATEMENT (Optional):414180}    HPI     {SUPERLIST (Optional):614068}  {additonal problems for provider to add (Optional):405093}    Review of Systems   {ROS COMP (Optional):702758}      Objective    There were no vitals taken for this visit.  There is no height or weight on file to calculate BMI.  Physical Exam   {Exam List (Optional):308437}    {Diagnostic Test Results (Optional):171213}    {AMBULATORY ATTESTATION (Optional):112731}

## 2022-04-04 NOTE — PROGRESS NOTES
SUBJECTIVE:   CC: Loco Landeros is an 36 year old male who presents for preventative health visit.       Patient has been advised of split billing requirements and indicates understanding: Yes  Healthy Habits:     Getting at least 3 servings of Calcium per day:  NO    Bi-annual eye exam:  NO    Dental care twice a year:  Yes    Sleep apnea or symptoms of sleep apnea:  None    Diet:  Regular (no restrictions)    Frequency of exercise:  4-5 days/week    Duration of exercise:  30-45 minutes    Taking medications regularly:  Yes    Medication side effects:  None    PHQ-2 Total Score: 0    Additional concerns today:  No      Dull ache in chest on L side, on going for about a year.     Today's PHQ-2 Score:   PHQ-2 ( 1999 Pfizer) 4/4/2022   Q1: Little interest or pleasure in doing things 0   Q2: Feeling down, depressed or hopeless 0   PHQ-2 Score 0   PHQ-2 Total Score (12-17 Years)- Positive if 3 or more points; Administer PHQ-A if positive -   Q1: Little interest or pleasure in doing things Not at all   Q2: Feeling down, depressed or hopeless Not at all   PHQ-2 Score 0       Abuse: Current or Past(Physical, Sexual or Emotional)- No  Do you feel safe in your environment? Yes        Social History     Tobacco Use     Smoking status: Light Tobacco Smoker     Packs/day: 0.50     Years: 5.00     Pack years: 2.50     Types: Cigarettes     Smokeless tobacco: Former User     Types: Chew     Quit date: 1/1/2002     Tobacco comment: socially smoker   Substance Use Topics     Alcohol use: Yes     Comment: once a week      If you drink alcohol do you typically have >3 drinks per day or >7 drinks per week? No    Alcohol Use 4/4/2022   Prescreen: >3 drinks/day or >7 drinks/week? Yes   Prescreen: >3 drinks/day or >7 drinks/week? -   AUDIT SCORE  11     AUDIT - Alcohol Use Disorders Identification Test - Reproduced from the World Health Organization Audit 2001 (Second Edition) 4/4/2022   1.  How often do you have a drink containing  alcohol? 4 or more times a week   2.  How many drinks containing alcohol do you have on a typical day when you are drinking? 3 or 4   3.  How often do you have five or more drinks on one occasion? Weekly   4.  How often during the last year have you found that you were not able to stop drinking once you had started? Never   5.  How often during the last year have you failed to do what was normally expected of you because of drinking? Never   6.  How often during the last year have you needed a first drink in the morning to get yourself going after a heavy drinking session? Never   7.  How often during the last year have you had a feeling of guilt or remorse after drinking? Monthly   8.  How often during the last year have you been unable to remember what happened the night before because of your drinking? Less than monthly   9.  Have you or someone else been injured because of your drinking? No   10. Has a relative, friend, doctor or other health care worker been concerned about your drinking or suggested you cut down? No   TOTAL SCORE 11       Last PSA:   PSA   Date Value Ref Range Status   04/01/2021 0.44 0 - 4 ug/L Final     Comment:     Assay Method:  Chemiluminescence using Siemens Vista analyzer       Reviewed orders with patient. Reviewed health maintenance and updated orders accordingly - Yes  Lab work is in process  Labs reviewed in EPIC  BP Readings from Last 3 Encounters:   04/04/22 119/82   05/03/21 110/74   04/01/21 118/77    Wt Readings from Last 3 Encounters:   04/04/22 104.2 kg (229 lb 11.2 oz)   05/03/21 102.9 kg (226 lb 12.8 oz)   04/01/21 103.6 kg (228 lb 6.4 oz)                 Patient Active Problem List   Diagnosis     Degeneration of lumbar or lumbosacral intervertebral disc     Family history of malignant neoplasm of prostate     Nicotine use disorder     Acne vulgaris     Crohn's disease of small intestine without complication (H)     Past Surgical History:   Procedure Laterality Date      COLONOSCOPY N/A 6/5/2020    Procedure: INTRAOPERATIVE COLONOSCOPY WITH BIOPSIES;  Surgeon: Rosa Serrato MD;  Location: [facility] OR     COLONOSCOPY WITH CO2 INSUFFLATION N/A 10/2/2017    Procedure: COLONOSCOPY WITH CO2 INSUFFLATION;  Colonoscopy, Vy Argueta, Rectal bleeding, BMI 29.74, [facility] [phone]. ;  Surgeon: Justa Pickard MD;  Location: [facility] OR     EXAM UNDER ANESTHESIA RECTUM N/A 6/5/2020    Procedure: EXAM UNDER ANESTHESIA, RECTUM;  Surgeon: Rosa Serrato MD;  Location: [facility] OR     FISTULOTOMY RECTUM N/A 6/5/2020    Procedure: FISTULOTOMY, RECTUM,;  Surgeon: Rosa Serrato MD;  Location: [facility] OR     NO HISTORY OF SURGERY       PLACEMENT OF SETON RECTUM N/A 6/5/2020    Procedure: PLACEMENT, SETON STITCH;  Surgeon: Rosa Serrato MD;  Location: [facility] OR       Social History     Tobacco Use     Smoking status: Light Tobacco Smoker     Packs/day: 0.50     Years: 5.00     Pack years: 2.50     Types: Cigarettes     Smokeless tobacco: Former User     Types: Chew     Quit date: 1/1/2002     Tobacco comment: socially smoker   Substance Use Topics     Alcohol use: Yes     Comment: once a week      Family History   Problem Relation Age of Onset     Prostate Cancer Father      Cancer Father      Hypertension Brother      Coronary Artery Disease Maternal Grandfather      Diabetes No family hx of      Hyperlipidemia No family hx of      Cerebrovascular Disease No family hx of      Breast Cancer No family hx of      Colon Cancer No family hx of      Depression No family hx of      Anxiety Disorder No family hx of      Thyroid Disease No family hx of      Genetic Disorder No family hx of      Asthma No family hx of          Current Outpatient Medications   Medication Sig Dispense Refill     azaTHIOprine (IMURAN) 50 MG tablet TAKE 2 TABLETS BY MOUTH EVERY DAY       inFLIXimab-dyyb (INFLECTRA IV)        Multiple Vitamins-Minerals (MULTIVITAMIN ADULT) CHEW Take 2 chew tab by mouth  daily       No Known Allergies    Reviewed and updated as needed this visit by clinical staff                  Reviewed and updated as needed this visit by Provider                 Past Medical History:   Diagnosis Date     Degeneration of lumbar or lumbosacral intervertebral disc      NO ACTIVE PROBLEMS       Past Surgical History:   Procedure Laterality Date     COLONOSCOPY N/A 6/5/2020    Procedure: INTRAOPERATIVE COLONOSCOPY WITH BIOPSIES;  Surgeon: Rosa Serrato MD;  Location:  OR     COLONOSCOPY WITH CO2 INSUFFLATION N/A 10/2/2017    Procedure: COLONOSCOPY WITH CO2 INSUFFLATION;  Colonoscopy, Vy Argueta, Rectal bleeding, BMI 29.74, [facility] [phone]. ;  Surgeon: Justa Pickard MD;  Location:  OR     EXAM UNDER ANESTHESIA RECTUM N/A 6/5/2020    Procedure: EXAM UNDER ANESTHESIA, RECTUM;  Surgeon: Rosa Serrato MD;  Location: [facility]     FISTULOTOMY RECTUM N/A 6/5/2020    Procedure: FISTULOTOMY, RECTUM,;  Surgeon: Rosa Serrato MD;  Location:  OR     NO HISTORY OF SURGERY       PLACEMENT OF SETON RECTUM N/A 6/5/2020    Procedure: PLACEMENT, SETON STITCH;  Surgeon: Rosa Serrato MD;  Location:  OR       Review of Systems   Constitutional: Negative for chills and fever.   HENT: Negative for congestion, ear pain, hearing loss and sore throat.    Eyes: Negative for pain and visual disturbance.   Respiratory: Negative for cough and shortness of breath.         Has intermittent left sided chest pain   Has tried prilosec without benefit   Sometimes will have pain with a deep breath  Has been going on for at least a year   No shortness of breath and he runs and not issue   No cough   Will come and go   Will have days with half a day for few hours then nothing   Notice it actually less when active    Cardiovascular: Negative for chest pain, palpitations and peripheral edema.   Gastrointestinal: Negative for abdominal pain, constipation, diarrhea, heartburn,  "hematochezia and nausea.   Genitourinary: Negative for dysuria, frequency, genital sores, hematuria, impotence, penile discharge and urgency.   Musculoskeletal: Positive for myalgias. Negative for arthralgias and joint swelling.   Skin: Negative for rash.   Neurological: Negative for dizziness, weakness, headaches and paresthesias.   Psychiatric/Behavioral: Negative for mood changes. The patient is not nervous/anxious.        OBJECTIVE:   /82 (BP Location: Right arm, Patient Position: Sitting, Cuff Size: Adult Large)   Pulse 60   Temp 98.1  F (36.7  C)   Resp 20   Ht 1.842 m (6' 0.5\")   Wt 104.2 kg (229 lb 11.2 oz)   SpO2 97%   BMI 30.72 kg/m      Physical Exam  GENERAL: healthy, alert and no distress  EYES: Eyes grossly normal to inspection, PERRL and conjunctivae and sclerae normal  HENT: ear canals and TM's normal, nose and mouth without ulcers or lesions  NECK: no adenopathy, no asymmetry, masses, or scars and thyroid normal to palpation  RESP: lungs clear to auscultation - no rales, rhonchi or wheezes  CV: regular rates and rhythm, no murmur, click or rub, peripheral pulses strong and no peripheral edema  ABDOMEN: soft, nontender, no hepatosplenomegaly, no masses and bowel sounds normal   (male): normal male genitalia without lesions or urethral discharge, no hernia  MS: no gross musculoskeletal defects noted, no edema  SKIN: no suspicious lesions or rashes  NEURO: Normal strength and tone, mentation intact and speech normal  PSYCH: mentation appears normal, affect normal/bright  LYMPH: no cervical, supraclavicular, axillary, or inguinal adenopathy    Diagnostic Test Results:  Labs reviewed in Epic  Results for orders placed or performed in visit on 04/04/22   CBC with platelets     Status: Normal   Result Value Ref Range    WBC Count 4.9 4.0 - 11.0 10e3/uL    RBC Count 5.00 4.40 - 5.90 10e6/uL    Hemoglobin 15.2 13.3 - 17.7 g/dL    Hematocrit 45.7 40.0 - 53.0 %    MCV 91 78 - 100 fL    MCH 30.4 " 26.5 - 33.0 pg    MCHC 33.3 31.5 - 36.5 g/dL    RDW 13.5 10.0 - 15.0 %    Platelet Count 220 150 - 450 10e3/uL   Comprehensive metabolic panel     Status: Abnormal   Result Value Ref Range    Sodium 139 133 - 144 mmol/L    Potassium 4.2 3.4 - 5.3 mmol/L    Chloride 107 94 - 109 mmol/L    Carbon Dioxide (CO2) 27 20 - 32 mmol/L    Anion Gap 5 3 - 14 mmol/L    Urea Nitrogen 12 7 - 30 mg/dL    Creatinine 0.99 0.66 - 1.25 mg/dL    Calcium 9.3 8.5 - 10.1 mg/dL    Glucose 87 70 - 99 mg/dL    Alkaline Phosphatase 32 (L) 40 - 150 U/L    AST 25 0 - 45 U/L    ALT 30 0 - 70 U/L    Protein Total 7.0 6.8 - 8.8 g/dL    Albumin 3.7 3.4 - 5.0 g/dL    Bilirubin Total 1.1 0.2 - 1.3 mg/dL    GFR Estimate >90 >60 mL/min/1.73m2   Lipid panel reflex to direct LDL Fasting     Status: Abnormal   Result Value Ref Range    Cholesterol 244 (H) <200 mg/dL    Triglycerides 105 <150 mg/dL    Direct Measure  >=40 mg/dL    LDL Cholesterol Calculated 123 (H) <=100 mg/dL    Non HDL Cholesterol 144 (H) <130 mg/dL    Patient Fasting > 8hrs? Yes     Narrative    Cholesterol  Desirable:  <200 mg/dL    Triglycerides  Normal:  Less than 150 mg/dL  Borderline High:  150-199 mg/dL  High:  200-499 mg/dL  Very High:  Greater than or equal to 500 mg/dL    Direct Measure HDL  Female:  Greater than or equal to 50 mg/dL   Male:  Greater than or equal to 40 mg/dL    LDL Cholesterol  Desirable:  <100mg/dL  Above Desirable:  100-129 mg/dL   Borderline High:  130-159 mg/dL   High:  160-189 mg/dL   Very High:  >= 190 mg/dL    Non HDL Cholesterol  Desirable:  130 mg/dL  Above Desirable:  130-159 mg/dL  Borderline High:  160-189 mg/dL  High:  190-219 mg/dL  Very High:  Greater than or equal to 220 mg/dL       ASSESSMENT/PLAN:   Loco was seen today for physical.    Diagnoses and all orders for this visit:    Routine general medical examination at a health care facility  -     REVIEW OF HEALTH MAINTENANCE PROTOCOL ORDERS    Atypical chest pain  -     -     CT  "Chest w/o Contrast; Future  Will follow up and/or notify patient of  results via My Chart to determine further need for followup      CARDIOVASCULAR SCREENING; LDL GOAL LESS THAN 160  -  -     Lipid panel reflex to direct LDL Fasting    Screening for diabetes mellitus (DM)  -     Comprehensive metabolic panel    Screening for disorder of blood and blood-forming organs  -     CBC with platelets    Crohn's disease of small intestine without complication (H)  FOLLOW UP WITH SPECIALIST :Gastroenterology    PLAN:    Patient needs to follow up in if no improvement,or sooner if worsening of symptoms or other symptoms develop.  Work on weight loss  Regular exercise  Ct chest   I will place order. Please call [phone] to schedule.  Will follow up and/or notify patient of  results via My Chart to determine further need for followup  Follow up office visit in one year for annual health maintenance exam, sooner PRN.    Patient has been advised of split billing requirements and indicates understanding: Yes    COUNSELING:   Reviewed preventive health counseling, as reflected in patient instructions  Special attention given to:        Regular exercise       Healthy diet/nutrition       Vision screening       Consider Hep C screening for all patients one time for ages 18-79 years    Estimated body mass index is 30.72 kg/m  as calculated from the following:    Height as of this encounter: 1.842 m (6' 0.5\").    Weight as of this encounter: 104.2 kg (229 lb 11.2 oz).     Weight management plan: Discussed healthy diet and exercise guidelines    He reports that he has quit smoking. His smoking use included cigarettes. He has a 2.50 pack-year smoking history. He quit smokeless tobacco use about 20 years ago.  His smokeless tobacco use included chew.      Counseling Resources:  ATP IV Guidelines  Pooled Cohorts Equation Calculator  FRAX Risk Assessment  ICSI Preventive Guidelines  Dietary Guidelines for Americans, 2010  USDA's " MyPlate  ASA Prophylaxis  Lung CA Screening    ESSIE Petersen CNP  [facility]

## 2022-04-05 NOTE — RESULT ENCOUNTER NOTE
Wesly Landeros,    Attached are your test results.  -Normal red blood cell (hgb) levels, normal white blood cell count and normal platelet levels.  -LDL(bad) cholesterol level is elevated which can increase your heart disease risk.  A diet high in fat and simple carbohydrates, genetics and being overweight can contribute to this. ADVISE: exercising 150 minutes of aerobic exercise per week (30 minutes for 5 days per week or 50 minutes for 3 days per week are options) and eating a low saturated fat/low carbohydrate diet are helpful to improve this. In 12 months, you should recheck your fasting cholesterol panel by scheduling a lab-only appointment.  -Liver and gallbladder tests are normal (ALT,AST, Alk phos, bilirubin), kidney function is normal (Cr, GFR), sodium is normal, potassium is normal, calcium is normal, glucose is normal.   Please contact us if you have any questions.    Vy Argueta, CNP

## 2022-04-08 ENCOUNTER — MYC MEDICAL ADVICE (OUTPATIENT)
Dept: FAMILY MEDICINE | Facility: CLINIC | Age: 37
End: 2022-04-08
Payer: COMMERCIAL

## 2022-04-08 DIAGNOSIS — R07.89 ATYPICAL CHEST PAIN: Primary | ICD-10-CM

## 2022-04-08 DIAGNOSIS — R07.9 INTERMITTENT CHEST PAIN: ICD-10-CM

## 2022-04-08 DIAGNOSIS — R07.81 PLEURITIC CHEST PAIN: ICD-10-CM

## 2022-04-12 NOTE — TELEPHONE ENCOUNTER
"Fax received 4/12/2022 Auth denied- determined not medically necessary- please see telephone encounter for appeal details.         Vy Argueta and team,     PB DOS: 4/11/2022  Type of Procedure: CT Chest w/o Contrast [ZTE898] (Order [id number])   CPT Codes: 22002   ICD10 Codes: Atypical chest pain [R07.89]   Surgeon/Ordering provider: [id number] Vy Cranburyag Argueta   Pre-cert/Authorization completed:Denied   Payer: [employer]   Spoke to [employer] Online Authorizations Portal   Ref. # [id number]/ Auth #    Valid Dates:   Reconsideration allowed through [employer] App until 5/3/2022 12:00:00 AM.    First Level Appeal is not delegated to [employer] or is no longer available for this case.    Second Level Appeal is not delegated to [employer] or is no longer available for this case    Callystro gave the option for reconsideration thru the portal with new clinicals but there are no other clinical or supporting documents to submit.   You can attempt for reconsideration at the number below.     Expedited or Post-Service Reconsiderations must be initiated by calling [employer] App at [phone], option 3.    Please send reconsideration response to the FC team at \"[facility] financial counselors\" pool or advise.     Prior authorization(PA) or \"no PA required\" is not a guarantee for coverage.  Please advise the patient to contact insurance for their specific plan benefits.     Thank you,    BARBARA Wheeler  Financial Counselor  [facility]  [address] [place] [address]  [phone]        "

## 2022-04-13 ENCOUNTER — TELEPHONE (OUTPATIENT)
Dept: FAMILY MEDICINE | Facility: CLINIC | Age: 37
End: 2022-04-13
Payer: COMMERCIAL

## 2022-04-13 NOTE — TELEPHONE ENCOUNTER
New request submitted.   PB DOS: TBD  Type of Procedure: CT Chest w Contrast   CPT Codes: 67879   ICD10 Codes: Atypical chest pain [R07.89]   Surgeon/Ordering provider: [id number] Vy Argueta   Pre-cert/Authorization completed:pending  Payer: [facility]   Spoke to Reba NGUYEN  Ref. # [id number]

## 2022-04-13 NOTE — TELEPHONE ENCOUNTER
PB DOS: 4/11/2022  Type of Procedure: CT Chest w/o Contrast [QDH148] (Order [id number])   CPT Codes: 16090   ICD10 Codes: Atypical chest pain [R07.89]   Surgeon/Ordering provider: [id number] Vy Argueta   Pre-cert/Authorization completed:Denied via fax  Payer: [employer]   Spoke to [facility]  Ref. # [id number]    [id number] not medically necessary   Appeal process presented in fax.                                If you would like to appeal please create a letter for appeal and let us know when it is finish so we can fax to the insurance.       Thank you,    BARBARA Wheeler  Financial Counselor  [facility]  [address] [place] [address]  [phone]

## 2022-04-19 NOTE — TELEPHONE ENCOUNTER
Cy Mcclellan and marshal,    This is a peer to peer request for CT Chest with contrast procedure. The insurance is requesting this to better understand the reason why the test is being ordered. This peer to peer needs to be completed on or before 4/25/2022.     Please call the insurance company and reference the following information:    Payor phone number: [phone]    Case number: [id number]    Patient ID: [id number]    Reason why it was denied:not medically necessary. You have not had a chest x-ray or ultrasound that showed a need for further imaging or failed to find the source of your problem.               Requesting response of outcome back to FC's on approval/denial with the following information:   o auth#  o date range  o who they spoke to     If you have any further questions please feel free to reach out to me. Thank you for your attention on this.        Thank you,    BARBARA Wheeler  Financial Counselor  [facility]  [address] [place] [address]  [phone]

## 2022-04-28 NOTE — TELEPHONE ENCOUNTER
Thank you Vy. Do you have the name of the person you spoke to and the date range for approval?

## 2022-04-28 NOTE — TELEPHONE ENCOUNTER
PB DOS: 5/4/2022  Type of Procedure: CT Chest w Contrast CPT Codes: 09403  CPT Codes: 90934  ICD10 Codes: Atypical chest pain [R07.89]   Surgeon/Ordering provider: [id number] Vy Argueta  Pre-cert/Authorization completed:  Approved  Payer: [facility]  Spoke to [facility] portal  Ref. # [id number]/ Auth # [id number]  Valid Dates: 04/22/2022-06/06/2022    Patient informed.

## 2022-04-28 NOTE — TELEPHONE ENCOUNTER
Rom Cano   I do not the physician I talked to said they would send a fax  validation as well so I just wrote the clearance number

## 2022-04-30 ENCOUNTER — OFFICE VISIT (OUTPATIENT)
Dept: URGENT CARE | Facility: URGENT CARE | Age: 37
End: 2022-04-30
Payer: COMMERCIAL

## 2022-04-30 VITALS
TEMPERATURE: 98.3 F | OXYGEN SATURATION: 95 % | BODY MASS INDEX: 30.75 KG/M2 | WEIGHT: 229.9 LBS | DIASTOLIC BLOOD PRESSURE: 85 MMHG | RESPIRATION RATE: 16 BRPM | SYSTOLIC BLOOD PRESSURE: 126 MMHG | HEART RATE: 72 BPM

## 2022-04-30 DIAGNOSIS — J02.9 VIRAL PHARYNGITIS: Primary | ICD-10-CM

## 2022-04-30 DIAGNOSIS — R07.0 THROAT PAIN: ICD-10-CM

## 2022-04-30 LAB
DEPRECATED S PYO AG THROAT QL EIA: NEGATIVE
GROUP A STREP BY PCR: NOT DETECTED

## 2022-04-30 PROCEDURE — U0005 INFEC AGEN DETEC AMPLI PROBE: HCPCS | Performed by: STUDENT IN AN ORGANIZED HEALTH CARE EDUCATION/TRAINING PROGRAM

## 2022-04-30 PROCEDURE — U0003 INFECTIOUS AGENT DETECTION BY NUCLEIC ACID (DNA OR RNA); SEVERE ACUTE RESPIRATORY SYNDROME CORONAVIRUS 2 (SARS-COV-2) (CORONAVIRUS DISEASE [COVID-19]), AMPLIFIED PROBE TECHNIQUE, MAKING USE OF HIGH THROUGHPUT TECHNOLOGIES AS DESCRIBED BY CMS-2020-01-R: HCPCS | Performed by: STUDENT IN AN ORGANIZED HEALTH CARE EDUCATION/TRAINING PROGRAM

## 2022-04-30 PROCEDURE — 99213 OFFICE O/P EST LOW 20 MIN: CPT | Performed by: STUDENT IN AN ORGANIZED HEALTH CARE EDUCATION/TRAINING PROGRAM

## 2022-04-30 PROCEDURE — 87651 STREP A DNA AMP PROBE: CPT | Performed by: STUDENT IN AN ORGANIZED HEALTH CARE EDUCATION/TRAINING PROGRAM

## 2022-04-30 NOTE — PROGRESS NOTES
Assessment & Plan     Viral pharyngitis  Rapid strep negative.  Awaiting strep PCR and COVID tests.  He has some purulent postnasal drainage in his pharynx and we discussed that this can cause irritation and painful swallowing.  I recommended ibuprofen every 8 hours as needed, salt water gargles, rest and time for symptoms to resolve.  We will contact him if the strep PCR is positive and order an antibiotic.  Follow-up if symptoms persist or worsen.    Throat pain  - Streptococcus A Rapid Screen w/Reflex to PCR - Clinic Collect  - Symptomatic; Auto-generated order COVID-19 Virus (Coronavirus) by PCR Nose  - Group A Streptococcus PCR Throat Swab         No follow-ups on file.    Melisa Elliott, ESSIE [facility] CARE [facility]    Nuno Paez is a 36 year old male who presents to clinic today for the following health issues:  Chief Complaint   Patient presents with     Covid 19 Testing     Sx started on Thursday with a really bad sore throat, cold and cough-want strep test and covid test     Throat Problem     URI     HPI      Review of Systems  Constitutional, HEENT, cardiovascular, pulmonary, gi and gu systems are negative, except as otherwise noted.      Objective    /85 (BP Location: Right arm, Patient Position: Sitting, Cuff Size: Adult Large)   Pulse 72   Temp 98.3  F (36.8  C) (Tympanic)   Resp 16   Wt 104.3 kg (229 lb 14.4 oz)   SpO2 95%   BMI 30.75 kg/m    Physical Exam   GENERAL: alert and no distress  EYES: Eyes grossly normal to inspection, PERRL and conjunctivae and sclerae normal  HENT: normal cephalic/atraumatic, oral mucous membranes moist, tonsillar erythema and tonsillar exudate  NECK: no adenopathy, no asymmetry, masses, or scars   RESP: lungs clear to auscultation - no rales, rhonchi or wheezes  MS: no gross musculoskeletal defects noted, no edema    Results for orders placed or performed in visit on 04/30/22   Streptococcus A Rapid Screen w/Reflex to  PCR - Clinic Collect     Status: Normal    Specimen: Throat; Swab   Result Value Ref Range    Group A Strep antigen Negative Negative

## 2022-05-01 LAB — SARS-COV-2 RNA RESP QL NAA+PROBE: NEGATIVE

## 2022-05-04 ENCOUNTER — ANCILLARY PROCEDURE (OUTPATIENT)
Dept: CT IMAGING | Facility: CLINIC | Age: 37
End: 2022-05-04
Attending: NURSE PRACTITIONER
Payer: COMMERCIAL

## 2022-05-04 DIAGNOSIS — R07.81 PLEURITIC CHEST PAIN: ICD-10-CM

## 2022-05-04 DIAGNOSIS — R07.9 INTERMITTENT CHEST PAIN: ICD-10-CM

## 2022-05-04 PROCEDURE — 71260 CT THORAX DX C+: CPT | Performed by: RADIOLOGY

## 2022-05-04 RX ORDER — IOPAMIDOL 755 MG/ML
112 INJECTION, SOLUTION INTRAVASCULAR ONCE
Status: COMPLETED | OUTPATIENT
Start: 2022-05-04 | End: 2022-05-04

## 2022-05-04 RX ADMIN — IOPAMIDOL 112 ML: 755 INJECTION, SOLUTION INTRAVASCULAR at 09:34

## 2022-05-04 NOTE — RESULT ENCOUNTER NOTE
Wesly Landeros,    Attached are your test results.  CT of chest is normal so that is reassuring    Please contact us if you have any questions.    Vy Argueta, CNP

## 2022-05-05 NOTE — ADDENDUM NOTE
Addended by: KANDI MOODY on: 4/13/2022 09:16 AM     Modules accepted: Orders     Is This A New Presentation, Or A Follow-Up?: Follow Up Cosrheax What Dose Of Cosentyx Are You Taking?: 300mg SC every 4 weeks Additional History: Patient does not have a current TB.

## 2022-05-19 ENCOUNTER — TRANSFERRED RECORDS (OUTPATIENT)
Dept: HEALTH INFORMATION MANAGEMENT | Facility: CLINIC | Age: 37
End: 2022-05-19
Payer: COMMERCIAL

## 2022-05-19 LAB
ALT SERPL-CCNC: 26 IU/L (ref 0–44)
AST SERPL-CCNC: 29 IU/L (ref 0–40)

## 2022-07-13 ENCOUNTER — TRANSFERRED RECORDS (OUTPATIENT)
Dept: HEALTH INFORMATION MANAGEMENT | Facility: CLINIC | Age: 37
End: 2022-07-13

## 2022-07-13 LAB
ALT SERPL-CCNC: 18 IU/L (ref 0–44)
AST SERPL-CCNC: 19 IU/L (ref 0–40)

## 2022-08-07 NOTE — PROGRESS NOTES
Otolaryngology - Head & Neck Surgery Progress Note  8/7/2022    ENT was stat paged to bedside regarding recurrent left-sided epistaxis. Hemoglobin recheck at 0800 was 6.4 down from 8.5. Per report, patient actively bleeding from the nose and noting shortness of breath. Code called prior to ENT arrival and patient was orotracheally intubated by Anesthesia for airway protection. Dry merocel noted in the right nare. Large clot burden filling the left nasal passage with active oozing both anteriorly and posteriorly. An epistat was temporarily placed and inflated in the left nasal passage for transport to the CVICU. After transfer due to continued oozing, all packing was removed and the patient's nose thoroughly irrigated with saline. A zero degree rigid nasal endoscope was used to inspect the nose. An anterior septal perforation is noted. There is a prominent vessel along the anteroinferior aspect of the septal perforation on the left with active venous bleeding. No pulsatile bleeding is noted. There is a small mucosal excoriation along the head of the left inferior turbinate. No other sources of bleeding are identified. The causative vessel was cauterized with silver nitrate. No significant active bleeding was noted at this point. Given the septal perforation and profound bleeding requiring intubation and transfer to the ICU, bilateral nares were again packed with 4 cm merocels wrapped in surgicel. Surgiflo was then applied bilaterally. The merocel tails were taped to the left cheek. No further blood pooling was identified in the oropharynx. Blood transfusion in process.    - Leave merocels in place for 3 days. These will be removed by ENT.  - Nasal saline q4h.  - Antibiotic staph prophylaxis covered with current vancomycin/cefepime.  - Afrin BID x3 days applied directly onto the nasal packing.  - In the case of further bleeding, spray Afrin directly on the nasal packing and hold firm pressure for 10-15 minutes. If  Please put in labs for appointment on 1/28/20.  Thanks,   Reba WRIGHT    bleeding continues, call ENT for further recommendations.    Patient discussed with staff surgeon, Dr. Mae.    Leatha Muro MD PGY-5  Otolaryngology - Head & Neck Surgery  Page ENT with concerns by dialing * [phone] and entering job code [phone].

## 2022-09-11 ENCOUNTER — HEALTH MAINTENANCE LETTER (OUTPATIENT)
Age: 37
End: 2022-09-11

## 2022-09-15 ENCOUNTER — TRANSFERRED RECORDS (OUTPATIENT)
Dept: HEALTH INFORMATION MANAGEMENT | Facility: CLINIC | Age: 37
End: 2022-09-15

## 2022-09-15 LAB
ALT SERPL-CCNC: 23 IU/L (ref 0–44)
AST SERPL-CCNC: 25 IU/L (ref 0–40)

## 2022-11-10 ENCOUNTER — TRANSFERRED RECORDS (OUTPATIENT)
Dept: HEALTH INFORMATION MANAGEMENT | Facility: CLINIC | Age: 37
End: 2022-11-10

## 2022-11-10 LAB
ALT SERPL-CCNC: 33 IU/L (ref 0–44)
AST SERPL-CCNC: 36 IU/L (ref 0–40)

## 2023-01-16 ENCOUNTER — TRANSFERRED RECORDS (OUTPATIENT)
Dept: HEALTH INFORMATION MANAGEMENT | Facility: CLINIC | Age: 38
End: 2023-01-16
Payer: COMMERCIAL

## 2023-01-16 LAB
ALT SERPL-CCNC: 18 IU/L (ref 0–44)
AST SERPL-CCNC: 34 IU/L (ref 0–40)

## 2023-02-08 ENCOUNTER — TRANSFERRED RECORDS (OUTPATIENT)
Dept: HEALTH INFORMATION MANAGEMENT | Facility: CLINIC | Age: 38
End: 2023-02-08
Payer: COMMERCIAL

## 2023-03-14 ENCOUNTER — TRANSFERRED RECORDS (OUTPATIENT)
Dept: HEALTH INFORMATION MANAGEMENT | Facility: CLINIC | Age: 38
End: 2023-03-14
Payer: COMMERCIAL

## 2023-03-14 LAB
ALT SERPL-CCNC: 20 IU/L (ref 0–44)
AST SERPL-CCNC: 26 IU/L (ref 0–40)

## 2023-04-20 ENCOUNTER — PATIENT OUTREACH (OUTPATIENT)
Dept: CARE COORDINATION | Facility: CLINIC | Age: 38
End: 2023-04-20
Payer: COMMERCIAL

## 2023-05-08 ENCOUNTER — TRANSFERRED RECORDS (OUTPATIENT)
Dept: HEALTH INFORMATION MANAGEMENT | Facility: CLINIC | Age: 38
End: 2023-05-08
Payer: COMMERCIAL

## 2023-05-08 LAB
ALT SERPL-CCNC: 33 IU/L (ref 0–44)
AST SERPL-CCNC: 45 IU/L (ref 0–40)

## 2023-06-03 ENCOUNTER — HEALTH MAINTENANCE LETTER (OUTPATIENT)
Age: 38
End: 2023-06-03

## 2023-07-10 ENCOUNTER — TRANSFERRED RECORDS (OUTPATIENT)
Dept: HEALTH INFORMATION MANAGEMENT | Facility: CLINIC | Age: 38
End: 2023-07-10
Payer: COMMERCIAL

## 2023-09-11 ENCOUNTER — TRANSFERRED RECORDS (OUTPATIENT)
Dept: HEALTH INFORMATION MANAGEMENT | Facility: CLINIC | Age: 38
End: 2023-09-11
Payer: COMMERCIAL

## 2023-09-11 LAB
ALT SERPL-CCNC: 18 IU/L (ref 0–44)
AST SERPL-CCNC: 23 IU/L (ref 0–40)

## 2023-11-27 ENCOUNTER — TRANSFERRED RECORDS (OUTPATIENT)
Dept: HEALTH INFORMATION MANAGEMENT | Facility: CLINIC | Age: 38
End: 2023-11-27
Payer: COMMERCIAL

## 2023-11-27 LAB
ALT SERPL-CCNC: 30 IU/L (ref 0–44)
AST SERPL-CCNC: 27 IU/L (ref 0–40)

## 2024-01-22 ENCOUNTER — TRANSFERRED RECORDS (OUTPATIENT)
Dept: HEALTH INFORMATION MANAGEMENT | Facility: CLINIC | Age: 39
End: 2024-01-22
Payer: COMMERCIAL

## 2024-01-22 LAB
ALT SERPL-CCNC: 24 IU/L (ref 0–44)
AST SERPL-CCNC: 27 IU/L (ref 0–40)

## 2024-02-14 ENCOUNTER — TRANSFERRED RECORDS (OUTPATIENT)
Dept: HEALTH INFORMATION MANAGEMENT | Facility: CLINIC | Age: 39
End: 2024-02-14
Payer: COMMERCIAL

## 2024-02-14 NOTE — PROGRESS NOTES
Good nutrition is important when healing from an illness, injury, or surgery.  Follow any nutrition recommendations given to you during your hospital stay.   If you were given an oral nutrition supplement while in the hospital, continue to take this supplement at home.  You can take it with meals, in-between meals, and/or before bedtime. These supplements can be purchased at most local grocery stores, pharmacies, and chain [employer]-stores.   If you have any questions about your diet or nutrition, call the hospital and ask for the dietitian.     Please put in orders for lab appointment on 3/21/19.     Thanks,   Reba WRIGHT    Screening Questionnaire for Adult Immunization    Are you sick today?   No   Do you have allergies to medications, food, a vaccine component or latex?   No   Have you ever had a serious reaction after receiving a vaccination?   No   Do you have a long-term health problem with heart disease, lung disease, asthma, kidney disease, metabolic disease (e.g. diabetes), anemia, or other blood disorder?   No   Do you have cancer, leukemia, HIV/AIDS, or any other immune system problem?   No   In the past 3 months, have you taken medications that affect  your immune system, such as prednisone, other steroids, or anticancer drugs; drugs for the treatment of rheumatoid arthritis, Crohn s disease, or psoriasis; or have you had radiation treatments?   No   Have you had a seizure, or a brain or other nervous system problem?   No   During the past year, have you received a transfusion of blood or blood     products, or been given immune (gamma) globulin or antiviral drug?   No   For women: Are you pregnant or is there a chance you could become        pregnant during the next month?   No   Have you received any vaccinations in the past 4 weeks?   No     Immunization questionnaire answers were all negative.      MNVFC doesn't apply on this patient    Per orders of Dr. Wegener, injection of Adacel (tdap) given by Deepa Suarez. Patient instructed to remain in clinic for 20 minutes afterwards, and to report any adverse reaction to me immediately.       Screening performed by Deepa Suarez on 2/27/2017 at 12:05 PM.

## 2024-03-18 ENCOUNTER — TRANSFERRED RECORDS (OUTPATIENT)
Dept: HEALTH INFORMATION MANAGEMENT | Facility: CLINIC | Age: 39
End: 2024-03-18
Payer: COMMERCIAL

## 2024-03-18 LAB
ALT SERPL-CCNC: 20 IU/L (ref 0–44)
AST SERPL-CCNC: 17 IU/L (ref 0–40)

## 2024-05-16 ENCOUNTER — TRANSFERRED RECORDS (OUTPATIENT)
Dept: HEALTH INFORMATION MANAGEMENT | Facility: CLINIC | Age: 39
End: 2024-05-16
Payer: COMMERCIAL

## 2024-05-16 LAB
ALT SERPL-CCNC: 25 IU/L (ref 0–44)
AST SERPL-CCNC: 26 IU/L (ref 0–40)

## 2024-06-15 SDOH — HEALTH STABILITY: PHYSICAL HEALTH: ON AVERAGE, HOW MANY MINUTES DO YOU ENGAGE IN EXERCISE AT THIS LEVEL?: 60 MIN

## 2024-06-15 SDOH — HEALTH STABILITY: PHYSICAL HEALTH: ON AVERAGE, HOW MANY DAYS PER WEEK DO YOU ENGAGE IN MODERATE TO STRENUOUS EXERCISE (LIKE A BRISK WALK)?: 5 DAYS

## 2024-06-15 ASSESSMENT — SOCIAL DETERMINANTS OF HEALTH (SDOH): HOW OFTEN DO YOU GET TOGETHER WITH FRIENDS OR RELATIVES?: ONCE A WEEK

## 2024-06-18 ENCOUNTER — OFFICE VISIT (OUTPATIENT)
Dept: FAMILY MEDICINE | Facility: CLINIC | Age: 39
End: 2024-06-18
Payer: COMMERCIAL

## 2024-06-18 VITALS
TEMPERATURE: 97.8 F | HEART RATE: 60 BPM | HEIGHT: 72 IN | RESPIRATION RATE: 12 BRPM | WEIGHT: 239 LBS | SYSTOLIC BLOOD PRESSURE: 119 MMHG | DIASTOLIC BLOOD PRESSURE: 88 MMHG | BODY MASS INDEX: 32.37 KG/M2 | OXYGEN SATURATION: 98 %

## 2024-06-18 DIAGNOSIS — Z00.00 ROUTINE GENERAL MEDICAL EXAMINATION AT A HEALTH CARE FACILITY: Primary | ICD-10-CM

## 2024-06-18 DIAGNOSIS — Z12.5 SCREENING FOR PROSTATE CANCER: ICD-10-CM

## 2024-06-18 DIAGNOSIS — K50.00 CROHN'S DISEASE OF SMALL INTESTINE WITHOUT COMPLICATION (H): ICD-10-CM

## 2024-06-18 DIAGNOSIS — Z13.6 CARDIOVASCULAR SCREENING; LDL GOAL LESS THAN 160: ICD-10-CM

## 2024-06-18 DIAGNOSIS — Z80.42 FAMILY HX OF PROSTATE CANCER: ICD-10-CM

## 2024-06-18 DIAGNOSIS — Z13.29 SCREENING FOR THYROID DISORDER: ICD-10-CM

## 2024-06-18 DIAGNOSIS — Z13.0 SCREENING FOR DISORDER OF BLOOD AND BLOOD-FORMING ORGANS: ICD-10-CM

## 2024-06-18 DIAGNOSIS — Z13.1 SCREENING FOR DIABETES MELLITUS (DM): ICD-10-CM

## 2024-06-18 PROBLEM — R79.89 ELEVATED LFTS: Status: RESOLVED | Noted: 2024-06-18 | Resolved: 2024-06-18

## 2024-06-18 PROBLEM — R79.9 ABNORMAL BLOOD CHEMISTRY LEVEL: Status: RESOLVED | Noted: 2023-05-11 | Resolved: 2024-06-18

## 2024-06-18 LAB
ALBUMIN SERPL BCG-MCNC: 3.9 G/DL (ref 3.5–5.2)
ALP SERPL-CCNC: 36 U/L (ref 40–150)
ALT SERPL W P-5'-P-CCNC: 23 U/L (ref 0–70)
ANION GAP SERPL CALCULATED.3IONS-SCNC: 10 MMOL/L (ref 7–15)
AST SERPL W P-5'-P-CCNC: 24 U/L (ref 0–45)
BILIRUB SERPL-MCNC: 0.4 MG/DL
BUN SERPL-MCNC: 12.7 MG/DL (ref 6–20)
CALCIUM SERPL-MCNC: 8.9 MG/DL (ref 8.6–10)
CHLORIDE SERPL-SCNC: 105 MMOL/L (ref 98–107)
CHOLEST SERPL-MCNC: 238 MG/DL
CREAT SERPL-MCNC: 0.92 MG/DL (ref 0.67–1.17)
DEPRECATED HCO3 PLAS-SCNC: 22 MMOL/L (ref 22–29)
EGFRCR SERPLBLD CKD-EPI 2021: >90 ML/MIN/1.73M2
ERYTHROCYTE [DISTWIDTH] IN BLOOD BY AUTOMATED COUNT: 12.4 % (ref 10–15)
FASTING STATUS PATIENT QL REPORTED: YES
FASTING STATUS PATIENT QL REPORTED: YES
GLUCOSE SERPL-MCNC: 100 MG/DL (ref 70–99)
HCT VFR BLD AUTO: 45.4 % (ref 40–53)
HDLC SERPL-MCNC: 77 MG/DL
HGB BLD-MCNC: 15.5 G/DL (ref 13.3–17.7)
LDLC SERPL CALC-MCNC: 124 MG/DL
MCH RBC QN AUTO: 30.1 PG (ref 26.5–33)
MCHC RBC AUTO-ENTMCNC: 34.1 G/DL (ref 31.5–36.5)
MCV RBC AUTO: 88 FL (ref 78–100)
NONHDLC SERPL-MCNC: 161 MG/DL
PLATELET # BLD AUTO: 227 10E3/UL (ref 150–450)
POTASSIUM SERPL-SCNC: 4.1 MMOL/L (ref 3.4–5.3)
PROT SERPL-MCNC: 6.4 G/DL (ref 6.4–8.3)
PSA SERPL DL<=0.01 NG/ML-MCNC: 0.49 NG/ML (ref 0–2.5)
RBC # BLD AUTO: 5.15 10E6/UL (ref 4.4–5.9)
SODIUM SERPL-SCNC: 137 MMOL/L (ref 135–145)
TRIGL SERPL-MCNC: 183 MG/DL
TSH SERPL DL<=0.005 MIU/L-ACNC: 3.3 UIU/ML (ref 0.3–4.2)
WBC # BLD AUTO: 6.3 10E3/UL (ref 4–11)

## 2024-06-18 PROCEDURE — 85027 COMPLETE CBC AUTOMATED: CPT | Performed by: NURSE PRACTITIONER

## 2024-06-18 PROCEDURE — 99396 PREV VISIT EST AGE 40-64: CPT | Performed by: NURSE PRACTITIONER

## 2024-06-18 PROCEDURE — 80053 COMPREHEN METABOLIC PANEL: CPT | Performed by: NURSE PRACTITIONER

## 2024-06-18 PROCEDURE — 80061 LIPID PANEL: CPT | Performed by: NURSE PRACTITIONER

## 2024-06-18 PROCEDURE — 36415 COLL VENOUS BLD VENIPUNCTURE: CPT | Performed by: NURSE PRACTITIONER

## 2024-06-18 PROCEDURE — G0103 PSA SCREENING: HCPCS | Performed by: NURSE PRACTITIONER

## 2024-06-18 PROCEDURE — 84443 ASSAY THYROID STIM HORMONE: CPT | Performed by: NURSE PRACTITIONER

## 2024-06-18 ASSESSMENT — PAIN SCALES - GENERAL: PAINLEVEL: NO PAIN (0)

## 2024-06-18 NOTE — PATIENT INSTRUCTIONS
"PLAN:   1.   Symptomatic therapy suggested: Continue current medications as prescribed.    2.  Orders Placed This Encounter   Procedures    REVIEW OF HEALTH MAINTENANCE PROTOCOL ORDERS    Lipid panel reflex to direct LDL Fasting    CBC with platelets    Comprehensive metabolic panel    TSH with free T4 reflex    Prostate Specific Antigen Screen     3.  Will follow up and/or notify patient of  results via My Chart to determine further need for followup   FOLLOW UP WITH SPECIALIST :Gastroenterology  Follow up office visit in one year for annual health maintenance exam, sooner PRN.   Patient needs to follow up in if no improvement,or sooner if worsening of symptoms or other symptoms develop.        Patient Education   Preventive Care Advice   This is general advice we often give to help people stay healthy. Your care team may have specific advice just for you. Please talk to your care team about your own preventive care needs.  Lifestyle  Exercise at least 150 minutes each week (30 minutes a day, 5 days a week).  Do muscle strengthening activities 2 days a week. These help control your weight and prevent disease.  No smoking.  Wear sunscreen to prevent skin cancer.  Have your home tested for radon every 2 to 5 years. Radon is a colorless, odorless gas that can harm your lungs. To learn more, go to www.health.[facility].mn. and search for \"Radon in Homes.\"  Keep guns unloaded and locked up in a safe place like a safe or gun vault, or, use a gun lock and hide the keys. Always lock away bullets separately. To learn more, visit [employer].[place].gov and search for \"safe gun storage.\"  Nutrition  Eat 5 or more servings of fruits and vegetables each day.  Try wheat bread, brown rice and whole grain pasta (instead of white bread, rice, and pasta).  Get enough calcium and vitamin D. Check the label on foods and aim for 100% of the RDA (recommended daily allowance).  Regular exams  Have a dental exam and cleaning every 6 months.  See your " health care team every year to talk about:  Any changes in your health.  Any medicines your care team has prescribed.  Preventive care, family planning, and ways to prevent chronic diseases.  Shots (vaccines)   HPV shots (up to age 26), if you've never had them before.  Hepatitis B shots (up to age 59), if you've never had them before.  COVID-19 shot: Get this shot when it's due.  Flu shot: Get a flu shot every year.  Tetanus shot: Get a tetanus shot every 10 years.  Pneumococcal, hepatitis A, and RSV shots: Ask your care team if you need these based on your risk.  Shingles shot (for age 50 and up).  General health tests  Diabetes screening:  Starting at age 35, Get screened for diabetes at least every 3 years.  If you are younger than age 35, ask your care team if you should be screened for diabetes.  Cholesterol test: At age 39, start having a cholesterol test every 5 years, or more often if advised.  Bone density scan (DEXA): At age 50, ask your care team if you should have this scan for osteoporosis (brittle bones).  Hepatitis C: Get tested at least once in your life.  Abdominal aortic aneurysm screening: Talk to your doctor about having this screening if you:  Have ever smoked; and  Are biologically male; and  Are between the ages of 65 and 75.  STIs (sexually transmitted infections)  Before age 24: Ask your care team if you should be screened for STIs.  After age 24: Get screened for STIs if you're at risk. You are at risk for STIs (including HIV) if:  You are sexually active with more than one person.  You don't use condoms every time.  You or a partner was diagnosed with a sexually transmitted infection.  If you are at risk for HIV, ask about PrEP medicine to prevent HIV.  Get tested for HIV at least once in your life, whether you are at risk for HIV or not.  Cancer screening tests  Cervical cancer screening: If you have a cervix, begin getting regular cervical cancer screening tests at age 21. Most people  "who have regular screenings with normal results can stop after age 65. Talk about this with your provider.  Breast cancer scan (mammogram): If you've ever had breasts, begin having regular mammograms starting at age 40. This is a scan to check for breast cancer.  Colon cancer screening: It is important to start screening for colon cancer at age 45.  Have a colonoscopy test every 10 years (or more often if you're at risk) Or, ask your provider about stool tests like a FIT test every year or Cologuard test every 3 years.  To learn more about your testing options, visit: www.[employer]/057066.pdf.  For help making a decision, visit: moses/dg42717.  Prostate cancer screening test: If you have a prostate and are age 55 to 69, ask your provider if you would benefit from a yearly prostate cancer screening test.  Lung cancer screening: If you are a current or former smoker age 50 to 80, ask your care team if ongoing lung cancer screenings are right for you.  For informational purposes only. Not to replace the advice of your health care provider. Copyright   2023 [facility]. All rights reserved. Clinically reviewed by the [facility]. [employer] 813179 - REV 04/24.  9 Ways to Cut Back on Drinking  Maybe you've found yourself drinking more alcohol than you'd prefer. If you want to cut back, here are some ideas to try.    Think before you drink.  Do you really want a drink, or is it just a habit? If you're used to having a drink at a certain time, try doing something else then.     Look for substitutes.  Find some no-alcohol drinks that you enjoy, like flavored seltzer water, tea with honey, or tonic with a slice of lime. Or try alcohol-free beer or \"virgin\" cocktails (without the alcohol).     Drink more water.  Use water to quench your thirst. Drink a glass of water before you have any alcohol. Have another glass along with every drink or between drinks.     Shrink your drink.  For " "example, have a bottle of beer instead of a pint. Use a smaller glass for wine. Choose drinks with lower alcohol content (ABV%). Or use less liquor and more mixer in cocktails.     Slow down.  It's easy to drink quickly and without thinking about it. Pay attention, and make each drink last longer.     Do the math.  Total up how much you spend on alcohol each month. How much is that a year? If you cut back, what could you do with the money you save?     Take a break.  Choose a day or two each week when you won't drink at all. Notice how you feel on those days, physically and emotionally. How did you sleep? Do you feel better? Over time, add more break days.     Count calories.  Would you like to lose some weight? For some people that's a good motivator for cutting back. Figure out how many calories are in each drink. How many does that add up to in a day? In a week? In a month?     Practice saying no.  Be ready when someone offers you a drink. Try: \"Thanks, I've had enough.\" Or \"Thanks, but I'm cutting back.\" Or \"No, thanks. I feel better when I drink less.\"   Current as of: November 15, 2023               Content Version: 14.0    6267-2449 [employer].   Care instructions adapted under license by your healthcare professional. If you have questions about a medical condition or this instruction, always ask your healthcare professional. [employer] disclaims any warranty or liability for your use of this information.    Substance Use Disorder: Care Instructions  Overview     You can improve your life and health by stopping your use of alcohol or drugs. When you don't drink or use drugs, you may feel and sleep better. You may get along better with your family, friends, and coworkers. There are medicines and programs that can help with substance use disorder.  How can you care for yourself at home?  Here are some ways to help you stay sober and prevent relapse.  If you have been given medicine " to help keep you sober or reduce your cravings, be sure to take it exactly as prescribed.  Talk to your doctor about programs that can help you stop using drugs or drinking alcohol.  Do not keep alcohol or drugs in your home.  Plan ahead. Think about what you'll say if other people ask you to drink or use drugs. Try not to spend time with people who drink or use drugs.  Use the time and money spent on drinking or drugs to do something that's important to you.  Preventing a relapse  Have a plan to deal with relapse. Learn to recognize changes in your thinking that lead you to drink or use drugs. Get help before you start to drink or use drugs again.  Try to stay away from situations, friends, or places that may lead you to drink or use drugs.  If you feel the need to drink alcohol or use drugs again, seek help right away. Call a trusted friend or family member. Some people get support from organizations such as Narcotics Anonymous or [employer] or from treatment facilities.  If you relapse, get help as soon as you can. Some people make a plan with another person that outlines what they want that person to do for them if they relapse. The plan usually includes how to handle the relapse and who to notify in case of relapse.  Don't give up. Remember that a relapse doesn't mean that you have failed. Use the experience to learn the triggers that lead you to drink or use drugs. Then quit again. Recovery is a lifelong process. Many people have several relapses before they are able to quit for good.  Follow-up care is a key part of your treatment and safety. Be sure to make and go to all appointments, and call your doctor if you are having problems. It's also a good idea to know your test results and keep a list of the medicines you take.  When should you call for help?   Call 911  anytime you think you may need emergency care. For example, call if you or someone else:    Has overdosed or has withdrawal signs. Be sure to  "tell the emergency workers that you are or someone else is using or trying to quit using drugs. Overdose or withdrawal signs may include:  Losing consciousness.  Seizure.  Seeing or hearing things that aren't there (hallucinations).     Is thinking or talking about suicide or harming others.   Where to get help 24 hours a day, 7 days a week   If you or someone you know talks about suicide, self-harm, a mental health crisis, a substance use crisis, or any other kind of emotional distress, get help right away. You can:    Call the Suicide and Crisis Lifeline at 418.     Call 4-576-851-TALK (1-803.580.9540).     Text HOME to 554003 to access the Crisis Text Line.   Consider saving these numbers in your phone.  Go to [employer] for more information or to chat online.  Call your doctor now or seek immediate medical care if:    You are having withdrawal symptoms. These may include nausea or vomiting, sweating, shakiness, and anxiety.   Watch closely for changes in your health, and be sure to contact your doctor if:    You have a relapse.     You need more help or support to stop.   Where can you learn more?  Go to https://www.[employer].net/patiented  Enter H573 in the search box to learn more about \"Substance Use Disorder: Care Instructions.\"  Current as of: November 15, 2023               Content Version: 14.0    6496-8282 [employer].   Care instructions adapted under license by your healthcare professional. If you have questions about a medical condition or this instruction, always ask your healthcare professional. [employer] disclaims any warranty or liability for your use of this information.         "

## 2024-06-18 NOTE — RESULT ENCOUNTER NOTE
Wesly Landeros,    Attached are your test results.  -Normal red blood cell (hgb) levels, normal white blood cell count and normal platelet levels.   Please contact us if you have any questions.    Vy Argueta, CNP

## 2024-06-18 NOTE — PROGRESS NOTES
"Preventive Care Visit  [facility]  Vy ESSIE Avitia CNP, Internal Medicine - Pediatrics  Jun 18, 2024      Assessment & Plan     Routine general medical examination at a health care facility  - REVIEW OF HEALTH MAINTENANCE PROTOCOL ORDERS    CARDIOVASCULAR SCREENING; LDL GOAL LESS THAN 160  - Lipid panel reflex to direct LDL Fasting    Screening for diabetes mellitus (DM)  - Comprehensive metabolic panel    Screening for thyroid disorder  - TSH with free T4 reflex    Screening for disorder of blood and blood-forming organs  - CBC with platelets    Screening for prostate cancer  - Prostate Specific Antigen Screen    Family hx of prostate cancer  - Prostate Specific Antigen Screen    Crohn's disease of small intestine without complication (H)  FOLLOW UP WITH SPECIALIST :Gastroenterology  - inFLIXimab-axxq (AVSOLA) in sodium chloride 0.9 % 250 mL via gravity infusion      Patient has been advised of split billing requirements and indicates understanding: Yes        Nicotine/Tobacco Cessation  He reports that he has been smoking cigarettes. He has a 2.5 pack-year smoking history. He has been exposed to tobacco smoke. He quit smokeless tobacco use about 22 years ago.  His smokeless tobacco use included chew.  Nicotine/Tobacco Cessation Plan  Information offered: Patient not interested at this time      BMI  Estimated body mass index is 32.6 kg/m  as calculated from the following:    Height as of this encounter: 1.824 m (5' 11.8\").    Weight as of this encounter: 108.4 kg (239 lb).   Weight management plan: Discussed healthy diet and exercise guidelines    Counseling  Appropriate preventive services were discussed with this patient, including applicable screening as appropriate for fall prevention, nutrition, physical activity, Tobacco-use cessation, weight loss and cognition.  Checklist reviewing preventive services available has been given to the patient.  Reviewed patient's diet, " addressing concerns and/or questions.   The patient reports drinking more than 3 alcoholic drinks per day and/or more than 7 drhnks per week. The patient was counseled and given information about possible harmful effects of excessive alcohol intake.    FUTURE APPOINTMENTS:       - Follow-up for annual visit or as needed  See Patient Instructions    Nuno Paez is a 39 year old, presenting for the following:  Physical        6/18/2024     7:44 AM   Additional Questions   Roomed by Meme   Accompanied by self         6/18/2024     7:44 AM   Patient Reported Additional Medications   Patient reports taking the following new medications no        Health Care Directive  Patient does not have a Health Care Directive or Living Will: Discussed advance care planning with patient; however, patient declined at this time.    HPI      6/15/2024   General Health   How would you rate your overall physical health? Good   Feel stress (tense, anxious, or unable to sleep) Not at all         6/15/2024   Nutrition   Three or more servings of calcium each day? (!) NO   Diet: I don't know   How many servings of fruit and vegetables per day? (!) 0-1   How many sweetened beverages each day? 0-1         6/15/2024   Exercise   Days per week of moderate/strenous exercise 5 days   Average minutes spent exercising at this level 60 min         6/15/2024   Social Factors   Frequency of gathering with friends or relatives Once a week   Worry food won't last until get money to buy more No   Food not last or not have enough money for food? No   Do you have housing?  Yes   Are you worried about losing your housing? No   Lack of transportation? No   Unable to get utilities (heat,electricity)? No         6/15/2024   Dental   Dentist two times every year? Yes         6/15/2024   TB Screening   Were you born outside of the [place]? No         Today's PHQ-2 Score:       6/17/2024     8:47 PM   PHQ-2 ( 1999 [employer])   Q1: Little interest or pleasure in doing  things 0   Q2: Feeling down, depressed or hopeless 0   PHQ-2 Score 0   Q1: Little interest or pleasure in doing things Not at all   Q2: Feeling down, depressed or hopeless Not at all   PHQ-2 Score 0           6/15/2024   Substance Use   Alcohol more than 3/day or more than 7/wk Yes   How often do you have a drink containing alcohol 2 to 3 times a week   How many alcohol drinks on typical day 3 or 4   How often do you have 5+ drinks at one occasion Monthly   Audit 2/3 Score 3   How often not able to stop drinking once started Monthly   How often failed to do what normally expected Never   How often needed first drink in am after a heavy drinking session Never   How often feeling of guilt or remorse after drinking Less than monthly   How often unable to remember what happened the night before Less than monthly   Have you or someone else been injured because of your drinking No   Has anyone been concerned or suggested you cut down on drinking No   TOTAL SCORE - AUDIT 10   Do you use any other substances recreationally? (!) ALCOHOL    (!) CANNABIS PRODUCTS     Social History     Tobacco Use    Smoking status: Light Smoker     Current packs/day: 0.50     Average packs/day: 0.5 packs/day for 5.0 years (2.5 ttl pk-yrs)     Types: Cigarettes     Passive exposure: Past    Smokeless tobacco: Former     Types: Chew     Quit date: 1/1/2002    Tobacco comments:     socially smoker   Vaping Use    Vaping status: Never Used   Substance Use Topics    Alcohol use: Yes     Comment: once a week     Drug use: Yes     Types: Marijuana     Comment: casual           6/15/2024   STI Screening   New sexual partner(s) since last STI/HIV test? No   ASCVD Risk   The 10-year ASCVD risk score (Michael FELTON, et al., 2019) is: 2%    Values used to calculate the score:      Age: 39 years      Sex: Male      Is Non- : No      Diabetic: No      Tobacco smoker: Yes      Systolic Blood Pressure: 119 mmHg      Is BP  treated: No      HDL Cholesterol: 100 mg/dL      Total Cholesterol: 244 mg/dL        6/15/2024   Contraception/Family Planning   Questions about contraception or family planning No        Reviewed and updated as needed this visit by Provider                    Past Medical History:   Diagnosis Date    Abnormal blood chemistry level 05/11/2023    Degeneration of lumbar or lumbosacral intervertebral disc     Elevated LFTs 06/18/2024     Past Surgical History:   Procedure Laterality Date    COLONOSCOPY N/A 6/5/2020    Procedure: INTRAOPERATIVE COLONOSCOPY WITH BIOPSIES;  Surgeon: Rosa Serrato MD;  Location: [facility] OR    COLONOSCOPY WITH CO2 INSUFFLATION N/A 10/2/2017    Procedure: COLONOSCOPY WITH CO2 INSUFFLATION;  Colonoscopy, Vy Argueta, Rectal bleeding, BMI 29.74, [facility] [place] [phone]. ;  Surgeon: Justa Pickard MD;  Location: [facility] OR    EXAM UNDER ANESTHESIA RECTUM N/A 6/5/2020    Procedure: EXAM UNDER ANESTHESIA, RECTUM;  Surgeon: Rosa Serrato MD;  Location: [facility] OR    FISTULOTOMY RECTUM N/A 6/5/2020    Procedure: FISTULOTOMY, RECTUM,;  Surgeon: Rosa Serrato MD;  Location:  OR    NO HISTORY OF SURGERY      PLACEMENT OF SETON RECTUM N/A 6/5/2020    Procedure: PLACEMENT, SETON STITCH;  Surgeon: Rosa Serrato MD;  Location:  OR     Lab work is in process  Labs reviewed in EPIC  BP Readings from Last 3 Encounters:   06/18/24 119/88   04/30/22 126/85   04/04/22 119/82    Wt Readings from Last 3 Encounters:   06/18/24 108.4 kg (239 lb)   04/30/22 104.3 kg (229 lb 14.4 oz)   04/04/22 104.2 kg (229 lb 11.2 oz)                  Patient Active Problem List   Diagnosis    Degeneration of lumbar or lumbosacral intervertebral disc    Family history of malignant neoplasm of prostate    Nicotine use disorder    Acne vulgaris    Crohn's disease of small intestine (H)     Past Surgical History:   Procedure Laterality Date    COLONOSCOPY N/A 6/5/2020    Procedure: INTRAOPERATIVE  COLONOSCOPY WITH BIOPSIES;  Surgeon: Rosa Serrato MD;  Location: [facility]    COLONOSCOPY WITH CO2 INSUFFLATION N/A 10/2/2017    Procedure: COLONOSCOPY WITH CO2 INSUFFLATION;  Colonoscopy, Vy Argueta, Rectal bleeding, BMI 29.74, [facility] [place] [phone]. ;  Surgeon: Justa Pickard MD;  Location: [facility]    EXAM UNDER ANESTHESIA RECTUM N/A 6/5/2020    Procedure: EXAM UNDER ANESTHESIA, RECTUM;  Surgeon: Rosa Serrato MD;  Location: [facility]    FISTULOTOMY RECTUM N/A 6/5/2020    Procedure: FISTULOTOMY, RECTUM,;  Surgeon: Rosa Serrato MD;  Location: [facility] OR    NO HISTORY OF SURGERY      PLACEMENT OF SETON RECTUM N/A 6/5/2020    Procedure: PLACEMENT, SETON STITCH;  Surgeon: Rosa Serrato MD;  Location: [facility] OR       Social History     Tobacco Use    Smoking status: Light Smoker     Current packs/day: 0.50     Average packs/day: 0.5 packs/day for 5.0 years (2.5 ttl pk-yrs)     Types: Cigarettes     Passive exposure: Past    Smokeless tobacco: Former     Types: Chew     Quit date: 1/1/2002    Tobacco comments:     socially smoker   Substance Use Topics    Alcohol use: Yes     Comment: once a week      Family History   Problem Relation Age of Onset    Prostate Cancer Father     Cancer Father     Hypertension Brother     Coronary Artery Disease Maternal Grandfather     Diabetes No family hx of     Hyperlipidemia No family hx of     Cerebrovascular Disease No family hx of     Breast Cancer No family hx of     Colon Cancer No family hx of     Depression No family hx of     Anxiety Disorder No family hx of     Thyroid Disease No family hx of     Genetic Disorder No family hx of     Asthma No family hx of          Current Outpatient Medications   Medication Sig Dispense Refill    azaTHIOprine (IMURAN) 50 MG tablet       IBUPROFEN PO       inFLIXimab-dyyb (INFLECTRA IV)       Multiple Vitamins-Minerals (MULTIVITAMIN ADULT) CHEW Take 2 chew tab by mouth daily       No Known  "Allergies    ROS:  CONSTITUTIONAL:POSITIVE  for weight gain and NEGATIVE  for anorexia, chills, sweats, and weight loss  INTEGUMENTARY/SKIN: NEGATIVE for worrisome rashes, moles or lesions  EYES: NEGATIVE for vision changes or irritation  ENT: NEGATIVE for ear, mouth and throat problems  RESP:NEGATIVE for significant cough or SOB  CV: NEGATIVE for chest pain, palpitations or peripheral edema  GI: POSITIVE for Hx IBD and get infusions every 8 weeks at [facility]  and NEGATIVE for jaundice, melena, nausea, poor appetite, and vomiting   male: negative for dysuria, hematuria, decreased urinary stream, erectile dysfunction, urethral discharge  MUSCULOSKELETAL:NEGATIVE for significant arthralgias or myalgia  NEURO: NEGATIVE for weakness, dizziness or paresthesias  ENDOCRINE: NEGATIVE for temperature intolerance, skin/hair changes  HEME/ALLERGY/IMMUNE: NEGATIVE for bleeding problems  PSYCHIATRIC: NEGATIVE for changes in mood or affect         Objective    Exam  /88 (BP Location: Right arm, Patient Position: Sitting, Cuff Size: Adult Large)   Pulse 60   Temp 97.8  F (36.6  C) (Temporal)   Resp 12   Ht 1.824 m (5' 11.8\")   Wt 108.4 kg (239 lb)   SpO2 98%   BMI 32.60 kg/m     Estimated body mass index is 32.6 kg/m  as calculated from the following:    Height as of this encounter: 1.824 m (5' 11.8\").    Weight as of this encounter: 108.4 kg (239 lb).  Wt Readings from Last 4 Encounters:   06/18/24 108.4 kg (239 lb)   04/30/22 104.3 kg (229 lb 14.4 oz)   04/04/22 104.2 kg (229 lb 11.2 oz)   05/03/21 102.9 kg (226 lb 12.8 oz)      Physical Exam  GENERAL: alert and no distress  EYES: Eyes grossly normal to inspection and conjunctivae and sclerae normal  HENT: ear canals and TM's normal, nose and mouth without ulcers or lesions  NECK: no adenopathy, no asymmetry, masses, or scars  RESP: lungs clear to auscultation - no rales, rhonchi or wheezes  BREAST: normal without masses, tenderness or nipple discharge and no palpable " axillary masses or adenopathy  CV: regular rates and rhythm, no murmur, click or rub, peripheral pulses strong, and no peripheral edema  ABDOMEN: soft, nontender, no hepatosplenomegaly, no masses and bowel sounds normal   (male): no hernias  MS: no gross musculoskeletal defects noted, no edema  SKIN: no suspicious lesions or rashes  NEURO: Normal strength and tone, mentation intact and speech normal  PSYCH: mentation appears normal, affect normal/bright  LYMPH: no cervical, supraclavicular, axillary, or inguinal adenopathy        Signed Electronically by: ESSIE Petersen CNP     139.9

## 2024-06-19 NOTE — RESULT ENCOUNTER NOTE
Wesly Landeros,    Attached are your test results.  -PSA (prostate specific antigen) test is normal.  -LDL(bad) cholesterol level is elevated, and your triglycerides are elevated which can increase your heart disease risk.  A diet high in fat and simple carbohydrates, genetics and being overweight can contribute to this. ADVISE: exercising 150 minutes of aerobic exercise per week (30 minutes for 5 days per week or 50 minutes for 3 days per week are options), eating a low saturated fat/low carbohydrate diet, and omega-3 fatty acids (fish oil) 4584-0989 mg daily are helpful to improve this. In 12 months, you should recheck your fasting cholesterol panel by scheduling a lab-only appointment.  -Liver and gallbladder tests are normal (ALT,AST, Alk phos, bilirubin), kidney function is normal (Cr, GFR), sodium is normal, potassium is normal, calcium is normal, glucose is normal.  -TSH (thyroid stimulating hormone) level is normal which indicates normal thyroid function.   Please contact us if you have any questions.    Vy Argueta, CNP

## 2024-07-12 ENCOUNTER — TRANSFERRED RECORDS (OUTPATIENT)
Dept: HEALTH INFORMATION MANAGEMENT | Facility: CLINIC | Age: 39
End: 2024-07-12
Payer: COMMERCIAL

## 2024-09-10 ENCOUNTER — TRANSFERRED RECORDS (OUTPATIENT)
Dept: HEALTH INFORMATION MANAGEMENT | Facility: CLINIC | Age: 39
End: 2024-09-10
Payer: COMMERCIAL

## 2024-11-12 ENCOUNTER — TRANSFERRED RECORDS (OUTPATIENT)
Dept: HEALTH INFORMATION MANAGEMENT | Facility: CLINIC | Age: 39
End: 2024-11-12
Payer: COMMERCIAL

## 2024-11-12 LAB
ALT SERPL-CCNC: 26 IU/L (ref 0–44)
AST SERPL-CCNC: 22 IU/L (ref 0–40)

## 2024-11-26 ENCOUNTER — TRANSFERRED RECORDS (OUTPATIENT)
Dept: HEALTH INFORMATION MANAGEMENT | Facility: CLINIC | Age: 39
End: 2024-11-26
Payer: COMMERCIAL

## 2025-03-04 ENCOUNTER — TRANSFERRED RECORDS (OUTPATIENT)
Dept: HEALTH INFORMATION MANAGEMENT | Facility: CLINIC | Age: 40
End: 2025-03-04
Payer: COMMERCIAL

## 2025-05-16 ENCOUNTER — TRANSFERRED RECORDS (OUTPATIENT)
Dept: MULTI SPECIALTY CLINIC | Facility: CLINIC | Age: 40
End: 2025-05-16
Payer: COMMERCIAL

## 2025-05-16 LAB
ALT SERPL-CCNC: 24 IU/L (ref 0–44)
AST SERPL-CCNC: 27 IU/L (ref 0–40)

## 2025-05-21 ENCOUNTER — TRANSFERRED RECORDS (OUTPATIENT)
Dept: ADMINISTRATIVE | Facility: CLINIC | Age: 40
End: 2025-05-21
Payer: COMMERCIAL

## 2025-05-22 NOTE — PROCEDURES
2025        Loco Landeros (Mike)   [address] [place] [address]      Loco Landeros (Mike),  :  1985    I am writing to report the recent test results.   Thank you for allowing [facility] the opportunity to take part in your healthcare.  At [facility] we strive to provide each patient with the finest gastroenterology care available.  We hope your experience was pleasant and informative.    Blood work looks good.  Hope you are feeling well.    Hepatic Function Panel (7) 2025 09:13   Description Result Units Flags Range   Bilirubin, Total 0.6 mg/dL  0.0-1.2   Bilirubin, Direct 0.19 mg/dL  0.00-0.40   AST (SGOT) 27 IU/L  0-40   ALT (SGPT) 24 IU/L  0-44   Albumin 3.9 g/dL L 4.1-5.1   Alkaline Phosphatase 43 IU/L L    Protein, Total 6.1 g/dL  6.0-8.5   Comments   Performed At: , [facility]  [address] [place] [address], [id number]  Elio Andrews MD, Phone: [phone]   CBC With Differential/Platelet 2025 09:13   Description Result Units Flags Range   Hemoglobin 15.7 g/dL  13.0-17.7   Hematocrit 46.9 %  37.5-51.0   MCV 89 fL  79-97   MCHC 33.5 g/dL  31.5-35.7   MCH 29.8 pg  26.6-33.0   RDW 13.0 %  11.6-15.4   Platelets 267 x10E3/uL  150-450   Neutrophils 51 %  Not Estab.   Lymphs 36 %  Not Estab.   Monocytes 10 %  Not Estab.   Eos 3 %  Not Estab.   Basos 0 %  Not Estab.   Neutrophils (Absolute) 2.7 x10E3/uL  1.4-7.0   Lymphs (Absolute) 1.9 x10E3/uL  0.7-3.1   Monocytes(Absolute) 0.5 x10E3/uL  0.1-0.9   Eos (Absolute) 0.2 x10E3/uL  0.0-0.4   Baso (Absolute) 0.0 x10E3/uL  0.0-0.2   Immature Grans (Abs) 0.0 x10E3/uL  0.0-0.1   Immature Granulocytes 0 %  Not Estab.   RBC 5.27 x10E6/uL  4.14-5.80   WBC 5.4 x10E3/uL  3.4-10.8   Comments   First Available              Performed At: , [facility]  [address], [place], [id number]  Elio Andrews MD, Phone: [phone]           Thank you.    Electronically signed by:  Jeffery Gramajo DO 2025 12:43 PM  Document  generated by:  Jeffery Gramajo DO  05/21/2025  If your provider ordered multiple tests; the results may not become available at the same time.  If multiple test results are received within 14 days of one another, you may receive a duplicate.  cc:  Vy Argueta CNP

## 2025-06-04 SDOH — HEALTH STABILITY: PHYSICAL HEALTH: ON AVERAGE, HOW MANY MINUTES DO YOU ENGAGE IN EXERCISE AT THIS LEVEL?: 40 MIN

## 2025-06-04 SDOH — HEALTH STABILITY: PHYSICAL HEALTH: ON AVERAGE, HOW MANY DAYS PER WEEK DO YOU ENGAGE IN MODERATE TO STRENUOUS EXERCISE (LIKE A BRISK WALK)?: 4 DAYS

## 2025-06-04 ASSESSMENT — SOCIAL DETERMINANTS OF HEALTH (SDOH): HOW OFTEN DO YOU GET TOGETHER WITH FRIENDS OR RELATIVES?: MORE THAN THREE TIMES A WEEK

## 2025-06-05 ENCOUNTER — RESULTS FOLLOW-UP (OUTPATIENT)
Dept: FAMILY MEDICINE | Facility: CLINIC | Age: 40
End: 2025-06-05

## 2025-06-05 ENCOUNTER — OFFICE VISIT (OUTPATIENT)
Dept: FAMILY MEDICINE | Facility: CLINIC | Age: 40
End: 2025-06-05
Payer: COMMERCIAL

## 2025-06-05 VITALS
TEMPERATURE: 97.9 F | BODY MASS INDEX: 29.59 KG/M2 | DIASTOLIC BLOOD PRESSURE: 84 MMHG | HEART RATE: 62 BPM | SYSTOLIC BLOOD PRESSURE: 117 MMHG | OXYGEN SATURATION: 97 % | HEIGHT: 72 IN | RESPIRATION RATE: 12 BRPM | WEIGHT: 218.5 LBS

## 2025-06-05 DIAGNOSIS — Z13.6 CARDIOVASCULAR SCREENING; LDL GOAL LESS THAN 160: Primary | ICD-10-CM

## 2025-06-05 DIAGNOSIS — Z13.0 SCREENING FOR DISORDER OF BLOOD AND BLOOD-FORMING ORGANS: ICD-10-CM

## 2025-06-05 DIAGNOSIS — Z80.42 FAMILY HX OF PROSTATE CANCER: ICD-10-CM

## 2025-06-05 DIAGNOSIS — Z00.00 ROUTINE GENERAL MEDICAL EXAMINATION AT A HEALTH CARE FACILITY: Primary | ICD-10-CM

## 2025-06-05 DIAGNOSIS — Z13.1 SCREENING FOR DIABETES MELLITUS (DM): ICD-10-CM

## 2025-06-05 DIAGNOSIS — Z12.5 SCREENING FOR PROSTATE CANCER: ICD-10-CM

## 2025-06-05 DIAGNOSIS — Z01.84 IMMUNITY STATUS TESTING: ICD-10-CM

## 2025-06-05 DIAGNOSIS — K50.00 CROHN'S DISEASE OF SMALL INTESTINE WITHOUT COMPLICATION (H): ICD-10-CM

## 2025-06-05 DIAGNOSIS — Z13.29 SCREENING FOR THYROID DISORDER: ICD-10-CM

## 2025-06-05 DIAGNOSIS — Z13.6 CARDIOVASCULAR SCREENING; LDL GOAL LESS THAN 160: ICD-10-CM

## 2025-06-05 LAB
ERYTHROCYTE [DISTWIDTH] IN BLOOD BY AUTOMATED COUNT: 12.7 % (ref 10–15)
EST. AVERAGE GLUCOSE BLD GHB EST-MCNC: 97 MG/DL
HBA1C MFR BLD: 5 % (ref 0–5.6)
HCT VFR BLD AUTO: 50.3 % (ref 40–53)
HGB BLD-MCNC: 16.8 G/DL (ref 13.3–17.7)
MCH RBC QN AUTO: 28.3 PG (ref 26.5–33)
MCHC RBC AUTO-ENTMCNC: 33.4 G/DL (ref 31.5–36.5)
MCV RBC AUTO: 85 FL (ref 78–100)
PLATELET # BLD AUTO: 275 10E3/UL (ref 150–450)
RBC # BLD AUTO: 5.93 10E6/UL (ref 4.4–5.9)
WBC # BLD AUTO: 9.7 10E3/UL (ref 4–11)

## 2025-06-05 PROCEDURE — 85027 COMPLETE CBC AUTOMATED: CPT | Performed by: NURSE PRACTITIONER

## 2025-06-05 PROCEDURE — 87340 HEPATITIS B SURFACE AG IA: CPT | Performed by: NURSE PRACTITIONER

## 2025-06-05 PROCEDURE — 86704 HEP B CORE ANTIBODY TOTAL: CPT | Performed by: NURSE PRACTITIONER

## 2025-06-05 PROCEDURE — 80053 COMPREHEN METABOLIC PANEL: CPT | Performed by: NURSE PRACTITIONER

## 2025-06-05 PROCEDURE — 86706 HEP B SURFACE ANTIBODY: CPT | Performed by: NURSE PRACTITIONER

## 2025-06-05 PROCEDURE — 84443 ASSAY THYROID STIM HORMONE: CPT | Performed by: NURSE PRACTITIONER

## 2025-06-05 PROCEDURE — 3050F LDL-C >= 130 MG/DL: CPT | Performed by: NURSE PRACTITIONER

## 2025-06-05 PROCEDURE — 80061 LIPID PANEL: CPT | Performed by: NURSE PRACTITIONER

## 2025-06-05 PROCEDURE — 36415 COLL VENOUS BLD VENIPUNCTURE: CPT | Performed by: NURSE PRACTITIONER

## 2025-06-05 PROCEDURE — 3074F SYST BP LT 130 MM HG: CPT | Performed by: NURSE PRACTITIONER

## 2025-06-05 PROCEDURE — 99396 PREV VISIT EST AGE 40-64: CPT | Performed by: NURSE PRACTITIONER

## 2025-06-05 PROCEDURE — 83735 ASSAY OF MAGNESIUM: CPT | Performed by: NURSE PRACTITIONER

## 2025-06-05 PROCEDURE — 3044F HG A1C LEVEL LT 7.0%: CPT | Performed by: NURSE PRACTITIONER

## 2025-06-05 PROCEDURE — 83036 HEMOGLOBIN GLYCOSYLATED A1C: CPT | Performed by: NURSE PRACTITIONER

## 2025-06-05 PROCEDURE — 3079F DIAST BP 80-89 MM HG: CPT | Performed by: NURSE PRACTITIONER

## 2025-06-05 PROCEDURE — 1126F AMNT PAIN NOTED NONE PRSNT: CPT | Performed by: NURSE PRACTITIONER

## 2025-06-05 PROCEDURE — G0103 PSA SCREENING: HCPCS | Performed by: NURSE PRACTITIONER

## 2025-06-05 ASSESSMENT — PAIN SCALES - GENERAL: PAINLEVEL_OUTOF10: NO PAIN (0)

## 2025-06-05 NOTE — PATIENT INSTRUCTIONS
PLAN:   1.   Symptomatic therapy suggested: Continue current medications as prescribed.      2.  Orders Placed This Encounter   Procedures    REVIEW OF HEALTH MAINTENANCE PROTOCOL ORDERS    Lipid panel reflex to direct LDL Fasting    CBC with platelets    Comprehensive metabolic panel    TSH with free T4 reflex    Hemoglobin A1c    Prostate Specific Antigen Screen    Hepatitis B Surface Antibody    HEPATITIS B CORE ANTIBODY    HEPATITIS B SURFACE ANTIGEN    Magnesium       3.  Will follow up and/or notify patient of  results via My Chart to determine further need for followup   Follow up office visit in one year for annual health maintenance exam, sooner PRN.   Patient needs to follow up in if no improvement,or sooner if worsening of symptoms or other symptoms develop.          Patient Education   Preventive Care Advice   This is general advice given by our system to help you stay healthy. However, your care team may have specific advice just for you. Please talk to your care team about your preventive care needs.  Nutrition  Eat 5 or more servings of fruits and vegetables each day.  Try wheat bread, brown rice and whole grain pasta (instead of white bread, rice, and pasta).  Get enough calcium and vitamin D. Check the label on foods and aim for 100% of the RDA (recommended daily allowance).  Lifestyle  Exercise at least 150 minutes each week  (30 minutes a day, 5 days a week).  Do muscle strengthening activities 2 days a week. These help control your weight and prevent disease.  No smoking.  Wear sunscreen to prevent skin cancer.  Have a dental exam and cleaning every 6 months.  Yearly exams  See your health care team every year to talk about:  Any changes in your health.  Any medicines your care team has prescribed.  Preventive care, family planning, and ways to prevent chronic diseases.  Shots (vaccines)   HPV shots (up to age 26), if you've never had them before.  Hepatitis B shots (up to age 59), if you've  never had them before.  COVID-19 shot: Get this shot when it's due.  Flu shot: Get a flu shot every year.  Tetanus shot: Get a tetanus shot every 10 years.  Pneumococcal, hepatitis A, and RSV shots: Ask your care team if you need these based on your risk.  Shingles shot (for age 50 and up)  General health tests  Diabetes screening:  Starting at age 35, Get screened for diabetes at least every 3 years.  If you are younger than age 35, ask your care team if you should be screened for diabetes.  Cholesterol test: At age 39, start having a cholesterol test every 5 years, or more often if advised.  Bone density scan (DEXA): At age 50, ask your care team if you should have this scan for osteoporosis (brittle bones).  Hepatitis C: Get tested at least once in your life.  STIs (sexually transmitted infections)  Before age 24: Ask your care team if you should be screened for STIs.  After age 24: Get screened for STIs if you're at risk. You are at risk for STIs (including HIV) if:  You are sexually active with more than one person.  You don't use condoms every time.  You or a partner was diagnosed with a sexually transmitted infection.  If you are at risk for HIV, ask about PrEP medicine to prevent HIV.  Get tested for HIV at least once in your life, whether you are at risk for HIV or not.  Cancer screening tests  Cervical cancer screening: If you have a cervix, begin getting regular cervical cancer screening tests starting at age 21.  Breast cancer scan (mammogram): If you've ever had breasts, begin having regular mammograms starting at age 40. This is a scan to check for breast cancer.  Colon cancer screening: It is important to start screening for colon cancer at age 45.  Have a colonoscopy test every 10 years (or more often if you're at risk) Or, ask your provider about stool tests like a FIT test every year or Cologuard test every 3 years.  To learn more about your testing options, visit:   .  For help making a decision,  visit:   https://bit.ly/wv21493.  Prostate cancer screening test: If you have a prostate, ask your care team if a prostate cancer screening test (PSA) at age 55 is right for you.  Lung cancer screening: If you are a current or former smoker ages 50 to 80, ask your care team if ongoing lung cancer screenings are right for you.  For informational purposes only. Not to replace the advice of your health care provider. Copyright   2023 [employer]. All rights reserved. Clinically reviewed by the  [employer] [place] [facility]. [employer] 742190 - REV 01/24.  Learning About Stress  What is stress?     Stress is your body's response to a hard situation. Your body can have a physical, emotional, or mental response. Stress is a fact of life for most people, and it affects everyone differently. What causes stress for you may not be stressful for someone else.  A lot of things can cause stress. You may feel stress when you go on a job interview, take a test, or run a race. This kind of short-term stress is normal and even useful. It can help you if you need to work hard or react quickly. For example, stress can help you finish an important job on time.  Long-term stress is caused by ongoing stressful situations or events. Examples of long-term stress include long-term health problems, ongoing problems at work, or conflicts in your family. Long-term stress can harm your health.  How does stress affect your health?  When you are stressed, your body responds as though you are in danger. It makes hormones that speed up your heart, make you breathe faster, and give you a burst of energy. This is called the fight-or-flight stress response. If the stress is over quickly, your body goes back to normal and no harm is done.  But if stress happens too often or lasts too long, it can have bad effects. Long-term stress can make you more likely to get sick, and it can make symptoms of some diseases worse. If you tense up  when you are stressed, you may develop neck, shoulder, or low back pain. Stress is linked to high blood pressure and heart disease.  Stress also harms your emotional health. It can make you solis, tense, or depressed. Your relationships may suffer, and you may not do well at work or school.  What can you do to manage stress?  You can try these things to help manage stress:   Do something active. Exercise or activity can help reduce stress. Walking is a great way to get started. Even everyday activities such as housecleaning or yard work can help.  Try yoga or alysa chi. These techniques combine exercise and meditation. You may need some training at first to learn them.  Do something you enjoy. For example, listen to music or go to a movie. Practice your hobby or do volunteer work.  Meditate. This can help you relax, because you are not worrying about what happened before or what may happen in the future.  Do guided imagery. Imagine yourself in any setting that helps you feel calm. You can use online videos, books, or a teacher to guide you.  Do breathing exercises. For example:  From a standing position, bend forward from the waist with your knees slightly bent. Let your arms dangle close to the floor.  Breathe in slowly and deeply as you return to a standing position. Roll up slowly and lift your head last.  Hold your breath for just a few seconds in the standing position.  Breathe out slowly and bend forward from the waist.  Let your feelings out. Talk, laugh, cry, and express anger when you need to. Talking with supportive friends or family, a counselor, or a reji leader about your feelings is a healthy way to relieve stress. Avoid discussing your feelings with people who make you feel worse.  Write. It may help to write about things that are bothering you. This helps you find out how much stress you feel and what is causing it. When you know this, you can find better ways to cope.  What can you do to prevent  "stress?  You might try some of these things to help prevent stress:  Manage your time. This helps you find time to do the things you want and need to do.  Get enough sleep. Your body recovers from the stresses of the day while you are sleeping.  Get support. Your family, friends, and community can make a difference in how you experience stress.  Limit your news feed. Avoid or limit time on social media or news that may make you feel stressed.  Do something active. Exercise or activity can help reduce stress. Walking is a great way to get started.  Where can you learn more?  Go to https://www.[employer].net/patiented  Enter N032 in the search box to learn more about \"Learning About Stress.\"  Current as of: October 24, 2024  Content Version: 14.4 2024-2025 [employer].   Care instructions adapted under license by your healthcare professional. If you have questions about a medical condition or this instruction, always ask your healthcare professional. [employer] disclaims any warranty or liability for your use of this information.    Substance Use Disorder: Care Instructions  Overview     You can improve your life and health by stopping your use of alcohol or drugs. When you don't drink or use drugs, you may feel and sleep better. You may get along better with your family, friends, and coworkers. There are medicines and programs that can help with substance use disorder.  How can you care for yourself at home?  Here are some ways to help you stay sober and prevent relapse.  If you have been given medicine to help keep you sober or reduce your cravings, be sure to take it exactly as prescribed.  Talk to your doctor about programs that can help you stop using drugs or drinking alcohol.  Do not keep alcohol or drugs in your home.  Plan ahead. Think about what you'll say if other people ask you to drink or use drugs. Try not to spend time with people who drink or use drugs.  Use the time and money " spent on drinking or drugs to do something that's important to you.  Preventing a relapse  Have a plan to deal with relapse. Learn to recognize changes in your thinking that lead you to drink or use drugs. Get help before you start to drink or use drugs again.  Try to stay away from situations, friends, or places that may lead you to drink or use drugs.  If you feel the need to drink alcohol or use drugs again, seek help right away. Call a trusted friend or family member. Some people get support from organizations such as Narcotics Anonymous or [employer] or from treatment facilities.  If you relapse, get help as soon as you can. Some people make a plan with another person that outlines what they want that person to do for them if they relapse. The plan usually includes how to handle the relapse and who to notify in case of relapse.  Don't give up. Remember that a relapse doesn't mean that you have failed. Use the experience to learn the triggers that lead you to drink or use drugs. Then quit again. Recovery is a lifelong process. Many people have several relapses before they are able to quit for good.  Follow-up care is a key part of your treatment and safety. Be sure to make and go to all appointments, and call your doctor if you are having problems. It's also a good idea to know your test results and keep a list of the medicines you take.  When should you call for help?   Call 911  anytime you think you may need emergency care. For example, call if you or someone else:    Has overdosed or has withdrawal signs. Be sure to tell the emergency workers that you are or someone else is using or trying to quit using drugs. Overdose or withdrawal signs may include:  Losing consciousness.  Seizure.  Seeing or hearing things that aren't there (hallucinations).     Is thinking or talking about suicide or harming others.   Where to get help 24 hours a day, 7 days a week   If you or someone you know talks about suicide,  "self-harm, a mental health crisis, a substance use crisis, or any other kind of emotional distress, get help right away. You can:    Call the Suicide and Crisis Lifeline at 988.     Call 1-487-850-PWWY (1-398.645.6170).     Text HOME to 035029 to access the Crisis Text Line.   Consider saving these numbers in your phone.  Go to [employer] for more information or to chat online.  Call your doctor now or seek immediate medical care if:    You are having withdrawal symptoms. These may include nausea or vomiting, sweating, shakiness, and anxiety.   Watch closely for changes in your health, and be sure to contact your doctor if:    You have a relapse.     You need more help or support to stop.   Where can you learn more?  Go to https://www.[employer].net/patiented  Enter H573 in the search box to learn more about \"Substance Use Disorder: Care Instructions.\"  Current as of: August 20, 2024  Content Version: 14.4    6242-3888 [employer].   Care instructions adapted under license by your healthcare professional. If you have questions about a medical condition or this instruction, always ask your healthcare professional. [employer] disclaims any warranty or liability for your use of this information.       "

## 2025-06-05 NOTE — PROGRESS NOTES
"Preventive Care Visit  [facility]  Vy ESSIE Avitia CNP, Family Medicine  Jun 5, 2025      Assessment & Plan     Routine general medical examination at a health care facility  - REVIEW OF HEALTH MAINTENANCE PROTOCOL ORDERS    Crohn's disease of small intestine without complication (H)  FOLLOW UP WITH SPECIALIST :Gastroenterology  - Magnesium    CARDIOVASCULAR SCREENING; LDL GOAL LESS THAN 160  - Lipid panel reflex to direct LDL Fasting    Screening for diabetes mellitus (DM)  - Comprehensive metabolic panel  - Hemoglobin A1c    Screening for thyroid disorder  - TSH with free T4 reflex    Screening for disorder of blood and blood-forming organs  - CBC with platelets    Screening for prostate cancer  - Prostate Specific Antigen Screen    Family hx of prostate cancer  - Prostate Specific Antigen Screen    Immunity status testing  - Hepatitis B Surface Antibody  - HEPATITIS B CORE ANTIBODY  - HEPATITIS B SURFACE ANTIGEN      Patient has been advised of split billing requirements and indicates understanding: Yes        BMI  Estimated body mass index is 29.43 kg/m  as calculated from the following:    Height as of this encounter: 1.835 m (6' 0.25\").    Weight as of this encounter: 99.1 kg (218 lb 8 oz).   Weight management plan: Discussed healthy diet and exercise guidelines    Counseling  Appropriate preventive services were addressed with this patient via screening, questionnaire, or discussion as appropriate for fall prevention, nutrition, physical activity, Tobacco-use cessation, social engagement, weight loss and cognition.  Checklist reviewing preventive services available has been given to the patient.  Reviewed patient's diet, addressing concerns and/or questions.   He is at risk for psychosocial distress and has been provided with information to reduce risk.       Follow-up    Follow-up Visit   Expected date:  Jun 05, 2026 (Approximate)      Follow Up Appointment Details:     " Follow-up with whom?: PCP    Follow-Up for what?: Adult Preventive    How?: In Person                 Nuno Paez is a 40 year old, presenting for the following:  Physical        6/5/2025    10:14 AM   Additional Questions   Roomed by Ruma GARCIA   Accompanied by self          Healthy Habits:     Getting at least 3 servings of Calcium per day:  NO    Bi-annual eye exam:  NO    Dental care twice a year:  Yes    Sleep apnea or symptoms of sleep apnea:  None    Diet:  Regular (no restrictions)    Frequency of exercise:  4-5 days/week    Duration of exercise:  30-45 minutes    Taking medications regularly:  Yes    Barriers to taking medications:  None    Medication side effects:  None    Additional concerns today:  No       Advance Care Planning    Discussed advance care planning with patient; informed AVS has link to [employer].        6/4/2025   General Health   How would you rate your overall physical health? Good   Feel stress (tense, anxious, or unable to sleep) To some extent   (!) STRESS CONCERN      6/4/2025   Nutrition   Three or more servings of calcium each day? (!) NO   Diet: Regular (no restrictions)   How many servings of fruit and vegetables per day? (!) 0-1   How many sweetened beverages each day? 0-1         6/4/2025   Exercise   Days per week of moderate/strenous exercise 4 days   Average minutes spent exercising at this level 40 min         6/4/2025   Social Factors   Frequency of gathering with friends or relatives More than three times a week   Worry food won't last until get money to buy more No   Food not last or not have enough money for food? No   Do you have housing? (Housing is defined as stable permanent housing and does not include staying outside in a car, in a tent, in an abandoned building, in an overnight shelter, or couch-surfing.) Yes   Are you worried about losing your housing? No   Lack of transportation? No   Unable to get utilities (heat,electricity)? No         6/4/2025    Dental   Dentist two times every year? Yes         Today's PHQ-2 Score:       6/4/2025     7:31 PM   PHQ-2 ( 1999 [employer])   Q1: Little interest or pleasure in doing things 0   Q2: Feeling down, depressed or hopeless 0   PHQ-2 Score 0    Q1: Little interest or pleasure in doing things Not at all   Q2: Feeling down, depressed or hopeless Not at all   PHQ-2 Score 0       Patient-reported           6/4/2025   Substance Use   Alcohol more than 3/day or more than 7/wk No   Do you use any other substances recreationally? (!) CANNABIS PRODUCTS     Social History     Tobacco Use    Smoking status: Former     Current packs/day: 0.50     Average packs/day: 0.5 packs/day for 5.0 years (2.5 ttl pk-yrs)     Types: Cigarettes     Passive exposure: Past    Smokeless tobacco: Former     Types: Chew     Quit date: 1/1/2002    Tobacco comments:     socially smoker   Vaping Use    Vaping status: Never Used   Substance Use Topics    Alcohol use: Yes     Comment: once a week     Drug use: Yes     Types: Marijuana     Comment: casual           6/4/2025   STI Screening   New sexual partner(s) since last STI/HIV test? No   ASCVD Risk   The 10-year ASCVD risk score (Michael DK, et al., 2019) is: 0.9%    Values used to calculate the score:      Age: 40 years      Sex: Male      Is Non- : No      Diabetic: No      Tobacco smoker: No      Systolic Blood Pressure: 117 mmHg      Is BP treated: No      HDL Cholesterol: 77 mg/dL      Total Cholesterol: 238 mg/dL        6/4/2025   Contraception/Family Planning   Questions about contraception or family planning No        Reviewed and updated as needed this visit by Provider   Tobacco  Allergies  Meds  Problems  Med Hx  Surg Hx  Fam Hx            Past Medical History:   Diagnosis Date    Abnormal blood chemistry level 05/11/2023    Degeneration of lumbar or lumbosacral intervertebral disc     Elevated LFTs 06/18/2024     Past Surgical History:   Procedure  Laterality Date    COLONOSCOPY N/A 6/5/2020    Procedure: INTRAOPERATIVE COLONOSCOPY WITH BIOPSIES;  Surgeon: Rosa Serrato MD;  Location:  OR    COLONOSCOPY WITH CO2 INSUFFLATION N/A 10/2/2017    Procedure: COLONOSCOPY WITH CO2 INSUFFLATION;  Colonoscopy, Vy Argueta, Rectal bleeding, BMI 29.74, [facility] [place] [phone]. ;  Surgeon: Justa Pickard MD;  Location: [facility] OR    EXAM UNDER ANESTHESIA RECTUM N/A 6/5/2020    Procedure: EXAM UNDER ANESTHESIA, RECTUM;  Surgeon: Rosa Serrato MD;  Location:  OR    FISTULOTOMY RECTUM N/A 6/5/2020    Procedure: FISTULOTOMY, RECTUM,;  Surgeon: Rosa Serrato MD;  Location:  OR    NO HISTORY OF SURGERY      PLACEMENT OF SETON RECTUM N/A 6/5/2020    Procedure: PLACEMENT, SETON STITCH;  Surgeon: Rosa Serrato MD;  Location:  OR     Lab work is in process  Labs reviewed in EPIC  BP Readings from Last 3 Encounters:   06/05/25 117/84   06/18/24 119/88   04/30/22 126/85    Wt Readings from Last 3 Encounters:   06/05/25 99.1 kg (218 lb 8 oz)   06/18/24 108.4 kg (239 lb)   04/30/22 104.3 kg (229 lb 14.4 oz)                  Patient Active Problem List   Diagnosis    Degeneration of lumbar or lumbosacral intervertebral disc    Family history of malignant neoplasm of prostate    Nicotine use disorder    Acne vulgaris    Crohn's disease of small intestine (H)     Past Surgical History:   Procedure Laterality Date    COLONOSCOPY N/A 6/5/2020    Procedure: INTRAOPERATIVE COLONOSCOPY WITH BIOPSIES;  Surgeon: Rosa Serrato MD;  Location:  OR    COLONOSCOPY WITH CO2 INSUFFLATION N/A 10/2/2017    Procedure: COLONOSCOPY WITH CO2 INSUFFLATION;  Colonoscopy, Vy Argueta, Rectal bleeding, BMI 29.74, [facility] [place] [phone]. ;  Surgeon: Justa Pickard MD;  Location: [facility] OR    EXAM UNDER ANESTHESIA RECTUM N/A 6/5/2020    Procedure: EXAM UNDER ANESTHESIA, RECTUM;  Surgeon: Rosa Serrato MD;  Location:  OR     FISTULOTOMY RECTUM N/A 6/5/2020    Procedure: FISTULOTOMY, RECTUM,;  Surgeon: Rosa Serrato MD;  Location:  OR    NO HISTORY OF SURGERY      PLACEMENT OF SETON RECTUM N/A 6/5/2020    Procedure: PLACEMENT, SETON STITCH;  Surgeon: Rosa Serrato MD;  Location:  OR       Social History     Tobacco Use    Smoking status: Former     Current packs/day: 0.50     Average packs/day: 0.5 packs/day for 5.0 years (2.5 ttl pk-yrs)     Types: Cigarettes     Passive exposure: Past    Smokeless tobacco: Former     Types: Chew     Quit date: 1/1/2002    Tobacco comments:     socially smoker   Substance Use Topics    Alcohol use: Yes     Comment: once a week      Family History   Problem Relation Age of Onset    Prostate Cancer Father     Cancer Father     Diabetes Brother     Hypertension Brother     Coronary Artery Disease Maternal Grandfather     Cerebrovascular Disease No family hx of     Breast Cancer No family hx of     Colon Cancer No family hx of     Depression No family hx of     Anxiety Disorder No family hx of     Thyroid Disease No family hx of     Genetic Disorder No family hx of     Asthma No family hx of     Rheumatoid Arthritis No family hx of     Hyperlipidemia No family hx of          Current Outpatient Medications   Medication Sig Dispense Refill    inFLIXimab-axxq (AVSOLA) in sodium chloride 0.9 % 250 mL via gravity infusion        No Known Allergies    ROS:  CONSTITUTIONAL:NEGATIVE for fever, chills, change in weight  INTEGUMENTARY/SKIN: NEGATIVE for worrisome rashes, moles or lesions  EYES: NEGATIVE for vision changes or irritation  ENT: NEGATIVE for ear, mouth and throat problems  RESP:NEGATIVE for significant cough or SOB  CV: NEGATIVE for chest pain, palpitations or peripheral edema  GI: POSITIVE for Hx IBD and NEGATIVE for jaundice, melena, nausea, poor appetite, and vomiting   male: negative for dysuria, hematuria, has some mild decreased urinary stream, erectile dysfunction, urethral  "discharge  Hard to get going noticed and a while to empty. Does drink about 3 cups of coffee in the morning   MUSCULOSKELETAL:NEGATIVE for significant arthralgias or myalgia. Will get cramping in his calves did have trip to [place] and a 13.5 hour flight   NEURO: NEGATIVE for weakness, dizziness or paresthesias  ENDOCRINE: NEGATIVE for temperature intolerance, skin/hair changes  HEME/ALLERGY/IMMUNE: NEGATIVE for bleeding problems  PSYCHIATRIC: NEGATIVE for changes in mood or affect           Objective    Exam  /84 (BP Location: Right arm, Patient Position: Sitting, Cuff Size: Adult Large)   Pulse 62   Temp 97.9  F (36.6  C) (Oral)   Resp 12   Ht 1.835 m (6' 0.25\")   Wt 99.1 kg (218 lb 8 oz)   SpO2 97%   BMI 29.43 kg/m     Estimated body mass index is 29.43 kg/m  as calculated from the following:    Height as of this encounter: 1.835 m (6' 0.25\").    Weight as of this encounter: 99.1 kg (218 lb 8 oz).  Wt Readings from Last 4 Encounters:   06/05/25 99.1 kg (218 lb 8 oz)   06/18/24 108.4 kg (239 lb)   04/30/22 104.3 kg (229 lb 14.4 oz)   04/04/22 104.2 kg (229 lb 11.2 oz)      Physical Exam  GENERAL: alert and no distress  EYES: Eyes grossly normal to inspection and conjunctivae and sclerae normal  HENT: ear canals and TM's normal, nose and mouth without ulcers or lesions  NECK: no adenopathy, no asymmetry, masses, or scars  RESP: lungs clear to auscultation - no rales, rhonchi or wheezes  CV: regular rates and rhythm, no murmur, click or rub, peripheral pulses strong, and no peripheral edema  ABDOMEN: soft, nontender, no hepatosplenomegaly, no masses and bowel sounds normal   (male): no hernias  MS: no gross musculoskeletal defects noted, no edema  SKIN: no suspicious lesions or rashes  NEURO: Normal strength and tone, mentation intact and speech normal  PSYCH: mentation appears normal, affect normal/bright  LYMPH: no cervical, supraclavicular, axillary, or inguinal adenopathy    Results for orders " placed or performed in visit on 06/05/25   Hepatitis B Surface Antibody     Status: None   Result Value Ref Range    Hepatitis B Surface Antibody Nonreactive     Hepatitis B Surface Antibody Instrument Value <3.50 <8.5 m[IU]/mL   HEPATITIS B CORE ANTIBODY     Status: Normal   Result Value Ref Range    Hepatitis B Core Antibody Total Nonreactive Nonreactive   HEPATITIS B SURFACE ANTIGEN     Status: Normal   Result Value Ref Range    Hepatitis B Surface Antigen Nonreactive Nonreactive   Lipid panel reflex to direct LDL Fasting     Status: Abnormal   Result Value Ref Range    Cholesterol 274 (H) <200 mg/dL    Triglycerides 118 <150 mg/dL    Direct Measure HDL 81 >=40 mg/dL    LDL Cholesterol Calculated 169 (H) <100 mg/dL    Non HDL Cholesterol 193 (H) <130 mg/dL    Patient Fasting > 8hrs? No     Narrative    Cholesterol  Desirable: < 200 mg/dL  Borderline High: 200 - 239 mg/dL  High: >= 240 mg/dL    Triglycerides  Normal: < 150 mg/dL  Borderline High: 150 - 199 mg/dL  High: 200-499 mg/dL  Very High: >= 500 mg/dL    Direct Measure HDL  Female: >= 50 mg/dL   Male: >= 40 mg/dL    LDL Cholesterol  Desirable: < 100 mg/dL  Above Desirable: 100 - 129 mg/dL   Borderline High: 130 - 159 mg/dL   High:  160 - 189 mg/dL   Very High: >= 190 mg/dL    Non HDL Cholesterol  Desirable: < 130 mg/dL  Above Desirable: 130 - 159 mg/dL  Borderline High: 160 - 189 mg/dL  High: 190 - 219 mg/dL  Very High: >= 220 mg/dL   CBC with platelets     Status: Abnormal   Result Value Ref Range    WBC Count 9.7 4.0 - 11.0 10e3/uL    RBC Count 5.93 (H) 4.40 - 5.90 10e6/uL    Hemoglobin 16.8 13.3 - 17.7 g/dL    Hematocrit 50.3 40.0 - 53.0 %    MCV 85 78 - 100 fL    MCH 28.3 26.5 - 33.0 pg    MCHC 33.4 31.5 - 36.5 g/dL    RDW 12.7 10.0 - 15.0 %    Platelet Count 275 150 - 450 10e3/uL   Comprehensive metabolic panel     Status: None   Result Value Ref Range    Sodium 139 135 - 145 mmol/L    Potassium 4.7 3.4 - 5.3 mmol/L    Carbon Dioxide (CO2) 26 22 - 29  mmol/L    Anion Gap 11 7 - 15 mmol/L    Urea Nitrogen 14.6 6.0 - 20.0 mg/dL    Creatinine 1.07 0.67 - 1.17 mg/dL    GFR Estimate 89 >60 mL/min/1.73m2    Calcium 9.8 8.8 - 10.4 mg/dL    Chloride 102 98 - 107 mmol/L    Glucose 82 70 - 99 mg/dL    Alkaline Phosphatase 51 40 - 150 U/L    AST 36 0 - 45 U/L    ALT 42 0 - 70 U/L    Protein Total 7.3 6.4 - 8.3 g/dL    Albumin 4.4 3.5 - 5.2 g/dL    Bilirubin Total 0.5 <=1.2 mg/dL    Patient Fasting > 8hrs? No    TSH with free T4 reflex     Status: Normal   Result Value Ref Range    TSH 2.91 0.30 - 4.20 uIU/mL   Hemoglobin A1c     Status: Normal   Result Value Ref Range    Estimated Average Glucose 97 <117 mg/dL    Hemoglobin A1C 5.0 0.0 - 5.6 %   Prostate Specific Antigen Screen     Status: Normal   Result Value Ref Range    Prostate Specific Antigen Screen 0.49 0.00 - 2.50 ng/mL    Narrative    This result is obtained using the Roche Elecsys total PSA method on the van [id number] immunoassay analyzer, which is an ultrasensitive method. Results obtained with different assay methods or kits cannot be used interchangeably.  This test is intended for initial prostate cancer screening. PSA values exceeding the age-specific limits are suspicious for prostate disease, but additional testing, such as prostate biopsy, is needed to diagnose prostate pathology. The American Cancer Society recommends annual examination with digital rectal examination and serum PSA beginning at age 50 and for men with a life expectancy of at least 10 years after detection of prostate cancer. For men in high-risk groups, such as  Americans or men with a first-degree relative diagnosed at a younger age, testing should begin at a younger age. It is generally recommended that information be provided to patients about the benefits and limitations of testing and treatment so they can make informed decisions.   Magnesium     Status: Normal   Result Value Ref Range    Magnesium 2.1 1.7 - 2.3 mg/dL          Signed Electronically by: ESSIE Petersen CNP

## 2025-06-06 LAB
ALBUMIN SERPL BCG-MCNC: 4.4 G/DL (ref 3.5–5.2)
ALP SERPL-CCNC: 51 U/L (ref 40–150)
ALT SERPL W P-5'-P-CCNC: 42 U/L (ref 0–70)
ANION GAP SERPL CALCULATED.3IONS-SCNC: 11 MMOL/L (ref 7–15)
AST SERPL W P-5'-P-CCNC: 36 U/L (ref 0–45)
BILIRUB SERPL-MCNC: 0.5 MG/DL
BUN SERPL-MCNC: 14.6 MG/DL (ref 6–20)
CALCIUM SERPL-MCNC: 9.8 MG/DL (ref 8.8–10.4)
CHLORIDE SERPL-SCNC: 102 MMOL/L (ref 98–107)
CHOLEST SERPL-MCNC: 274 MG/DL
CREAT SERPL-MCNC: 1.07 MG/DL (ref 0.67–1.17)
EGFRCR SERPLBLD CKD-EPI 2021: 89 ML/MIN/1.73M2
FASTING STATUS PATIENT QL REPORTED: NO
FASTING STATUS PATIENT QL REPORTED: NO
GLUCOSE SERPL-MCNC: 82 MG/DL (ref 70–99)
HBV CORE AB SERPL QL IA: NONREACTIVE
HBV SURFACE AB SERPL IA-ACNC: <3.5 M[IU]/ML
HBV SURFACE AB SERPL IA-ACNC: NONREACTIVE M[IU]/ML
HBV SURFACE AG SERPL QL IA: NONREACTIVE
HCO3 SERPL-SCNC: 26 MMOL/L (ref 22–29)
HDLC SERPL-MCNC: 81 MG/DL
LDLC SERPL CALC-MCNC: 169 MG/DL
MAGNESIUM SERPL-MCNC: 2.1 MG/DL (ref 1.7–2.3)
NONHDLC SERPL-MCNC: 193 MG/DL
POTASSIUM SERPL-SCNC: 4.7 MMOL/L (ref 3.4–5.3)
PROT SERPL-MCNC: 7.3 G/DL (ref 6.4–8.3)
PSA SERPL DL<=0.01 NG/ML-MCNC: 0.49 NG/ML (ref 0–2.5)
SODIUM SERPL-SCNC: 139 MMOL/L (ref 135–145)
TRIGL SERPL-MCNC: 118 MG/DL
TSH SERPL DL<=0.005 MIU/L-ACNC: 2.91 UIU/ML (ref 0.3–4.2)

## 2025-07-23 ENCOUNTER — TRANSFERRED RECORDS (OUTPATIENT)
Dept: ADMINISTRATIVE | Facility: CLINIC | Age: 40
End: 2025-07-23
Payer: COMMERCIAL

## 2025-07-24 NOTE — PROGRESS NOTES
_________________________________________________________________________________________________    Patient name: Loco Landeros  YOB: 1985  Encounter date: 07/23/2025 03:00 PM  Current date: 07/23/2025 04:25 PM  Procedure: Infusion      Infusion/Additional Medication Orders    Assessment: Crohn's disease of small intestine with fistula K50.013     Medication grids  Order Date Medication Dose Route Rate Rate 2 Rate 3 Rate 4 Int. of Treat.   02/26/2025 Avsola 10mg/kg  mL/hr    8 Weeks   Inf. Date Medication % Conc. Inf. # Therapy Type Bag # Vials Total mgs Lot # Exp. Date   07/23/2025 Avsola  32 maintenance  10 1000.00 [id number] 10/31/2028   Inf. Date Medication IV Site IV Gauge Start Stop Total Time Wt. (lbs) Wt. (kgs)   07/23/2025 Avsola left hand 22 3:05 PM 4:05 PM  223.50 101.361     Vitals Flowsheet  Time Temp Pulse Resp Sys BP Angela BP Reaction Conc Rate   2:42 PM 96.9 63 16 128 82      3:35 PM 97.2 73 16 111 62      4:05 PM 97.7 76 16 112 69        Post Infusion  The patient did tolerate the infusion.     Nursing Notes  Order date: 02/26/2025  Last infusion date: 05/16/2025  Oral premeds per order: N/A  Specialty Pharmacy: N  Change in health status per assessment? N  IV maintenance 0.9% NS 500ml TKO  Start time: 1500  IV start by: Ceci Dial RN  IV and Fluid D/C time: 1620  NS volume infused: <50mL  Site condition: CDI and patent throughout infusion, no redness/swelling at IV site  D/C instructions reviewed, Pt verbalized understanding.  Ceci Dial RN    Date Medication Total mg Used mg Wasted mg   07/23/2025 Avsola 1000.00 1000.00 0.00   05/16/2025 Avsola 1000.00 1000.00 0.00   03/04/2025 Avsola 1000.00 1000.00 0.00   01/07/2025 Avsola 1100.00 1036.00 64.00   11/12/2024 Avsola 1100.00 1047.00 53.00   09/10/2024 Avsola 1100.00 1075.00 25.00       Visit oversight provided by:  Tianna Zepeda MD 07/23/2025 03:00 PM

## (undated) DEVICE — SU SILK 0 24" TIE SA76G

## (undated) DEVICE — GLOVE PROTEXIS BLUE W/NEU-THERA 6.5  2D73EB65

## (undated) DEVICE — SUCTION CANISTER MEDIVAC LINER 3000ML W/LID 65651-530

## (undated) DEVICE — DRAPE MINOR PROCEDURE LAP 29496

## (undated) DEVICE — SOL WATER IRRIG 1000ML BOTTLE 07139-09

## (undated) DEVICE — DRSG KERLIX FLUFFS X5

## (undated) DEVICE — DRSG ABDOMINAL 07 1/2X8" 7197D

## (undated) DEVICE — SUCTION TIP YANKAUER W/O VENT K86

## (undated) DEVICE — SOL NACL 0.9% IRRIG 1000ML BOTTLE 2F7124

## (undated) DEVICE — PACK MINOR SBA15MIFSE

## (undated) DEVICE — ESU PENCIL W/HOLSTER E2350H

## (undated) DEVICE — GLOVE PROTEXIS W/NEU-THERA 6.5  2D73TE65

## (undated) DEVICE — SOL WATER IRRIG 1000ML BOTTLE 2F7114

## (undated) DEVICE — VESSEL LOOPS RED MAXI

## (undated) DEVICE — PANTIES MESH LG/XLG 2PK 706M2

## (undated) DEVICE — ESU PENCIL W/SMOKE EVAC CVPLP2000

## (undated) DEVICE — GOWN LG DISP 9515

## (undated) DEVICE — SYR EAR BULB 3OZ 0035830

## (undated) DEVICE — LINEN TOWEL PACK X5 5464

## (undated) DEVICE — SPONGE BALL KERLIX ROUND XL W/O STRING LATEX 4935

## (undated) RX ORDER — FENTANYL CITRATE 50 UG/ML
INJECTION, SOLUTION INTRAMUSCULAR; INTRAVENOUS
Status: DISPENSED
Start: 2020-06-05

## (undated) RX ORDER — PROPOFOL 10 MG/ML
INJECTION, EMULSION INTRAVENOUS
Status: DISPENSED
Start: 2020-06-05

## (undated) RX ORDER — BUPIVACAINE HYDROCHLORIDE 5 MG/ML
INJECTION, SOLUTION EPIDURAL; INTRACAUDAL
Status: DISPENSED
Start: 2020-06-05

## (undated) RX ORDER — ACETAMINOPHEN 160 MG
TABLET,DISINTEGRATING ORAL
Status: DISPENSED
Start: 2020-06-05

## (undated) RX ORDER — FENTANYL CITRATE 50 UG/ML
INJECTION, SOLUTION INTRAMUSCULAR; INTRAVENOUS
Status: DISPENSED
Start: 2017-10-02

## (undated) RX ORDER — BUPIVACAINE HYDROCHLORIDE AND EPINEPHRINE 5; 5 MG/ML; UG/ML
INJECTION, SOLUTION EPIDURAL; INTRACAUDAL; PERINEURAL
Status: DISPENSED
Start: 2020-06-05

## (undated) RX ORDER — GLYCOPYRROLATE 0.2 MG/ML
INJECTION, SOLUTION INTRAMUSCULAR; INTRAVENOUS
Status: DISPENSED
Start: 2020-06-05

## (undated) RX ORDER — NEOSTIGMINE METHYLSULFATE 1 MG/ML
VIAL (ML) INJECTION
Status: DISPENSED
Start: 2020-06-05